# Patient Record
Sex: MALE | Race: WHITE | HISPANIC OR LATINO | Employment: UNEMPLOYED | URBAN - METROPOLITAN AREA
[De-identification: names, ages, dates, MRNs, and addresses within clinical notes are randomized per-mention and may not be internally consistent; named-entity substitution may affect disease eponyms.]

---

## 2017-04-06 DIAGNOSIS — E11.9 DIABETES MELLITUS WITHOUT COMPLICATION (HCC): ICD-10-CM

## 2017-04-06 DIAGNOSIS — E13.9 OTHER SPECIFIED DIABETES MELLITUS WITHOUT COMPLICATIONS (HCC): ICD-10-CM

## 2017-04-06 NOTE — TELEPHONE ENCOUNTER
Was the patient seen in the last year in this department? Yes     Does patient have an active prescription for medications requested? No     Received Request Via: Pharmacy    Pt is requesting 90 Day Supply.

## 2017-04-10 ENCOUNTER — HOSPITAL ENCOUNTER (OUTPATIENT)
Dept: LAB | Facility: MEDICAL CENTER | Age: 62
End: 2017-04-10
Attending: INTERNAL MEDICINE
Payer: COMMERCIAL

## 2017-04-10 LAB
CREAT SERPL-MCNC: 1.03 MG/DL (ref 0.5–1.4)
CREAT UR-MCNC: 235.1 MG/DL
EST. AVERAGE GLUCOSE BLD GHB EST-MCNC: 131 MG/DL
GFR SERPL CREATININE-BSD FRML MDRD: >60 ML/MIN/1.73 M 2
HBA1C MFR BLD: 6.2 % (ref 0–5.6)
MICROALBUMIN UR-MCNC: 2.1 MG/DL
MICROALBUMIN/CREAT UR: 9 MG/G (ref 0–30)

## 2017-04-10 PROCEDURE — 82570 ASSAY OF URINE CREATININE: CPT

## 2017-04-10 PROCEDURE — 83036 HEMOGLOBIN GLYCOSYLATED A1C: CPT

## 2017-04-10 PROCEDURE — 36415 COLL VENOUS BLD VENIPUNCTURE: CPT

## 2017-04-10 PROCEDURE — 82043 UR ALBUMIN QUANTITATIVE: CPT

## 2017-04-10 PROCEDURE — 82565 ASSAY OF CREATININE: CPT

## 2017-04-12 ENCOUNTER — OFFICE VISIT (OUTPATIENT)
Dept: MEDICAL GROUP | Facility: CLINIC | Age: 62
End: 2017-04-12
Payer: COMMERCIAL

## 2017-04-12 VITALS
OXYGEN SATURATION: 96 % | SYSTOLIC BLOOD PRESSURE: 120 MMHG | WEIGHT: 227 LBS | RESPIRATION RATE: 18 BRPM | TEMPERATURE: 97.9 F | BODY MASS INDEX: 31.78 KG/M2 | HEIGHT: 71 IN | HEART RATE: 69 BPM | DIASTOLIC BLOOD PRESSURE: 80 MMHG

## 2017-04-12 DIAGNOSIS — E78.5 DYSLIPIDEMIA: ICD-10-CM

## 2017-04-12 DIAGNOSIS — N52.9 ERECTILE DYSFUNCTION, UNSPECIFIED ERECTILE DYSFUNCTION TYPE: ICD-10-CM

## 2017-04-12 DIAGNOSIS — N40.0 BENIGN PROSTATIC HYPERPLASIA WITHOUT LOWER URINARY TRACT SYMPTOMS, UNSPECIFIED MORPHOLOGY: ICD-10-CM

## 2017-04-12 DIAGNOSIS — K76.0 FATTY LIVER: ICD-10-CM

## 2017-04-12 DIAGNOSIS — R63.8 INCREASED BMI: ICD-10-CM

## 2017-04-12 PROCEDURE — 99214 OFFICE O/P EST MOD 30 MIN: CPT | Performed by: INTERNAL MEDICINE

## 2017-04-12 ASSESSMENT — PATIENT HEALTH QUESTIONNAIRE - PHQ9: CLINICAL INTERPRETATION OF PHQ2 SCORE: 0

## 2017-04-12 NOTE — MR AVS SNAPSHOT
"        John Holguin Reyes   2017 8:40 AM   Office Visit   MRN: [id number]    Department:  [facility]   Dept Phone:  [phone]    Description:  Male : 1955   Provider:  Vaughn Waters M.D.           Allergies as of 2017     Allergen Noted Reactions    Sulfa Drugs 2014   Rash    ratches    Septra [Bactrim Ds] 2014   Rash      You were diagnosed with     Dyslipidemia   [196322]       Insulin dependent diabetes mellitus (CMS-HCC)   [760283]         Vital Signs     Blood Pressure Pulse Temperature Respirations Height Weight    120/80 mmHg 69 36.6 °C (97.9 °F) 18 1.803 m (5' 10.98\") 102.967 kg (227 lb)    Body Mass Index Oxygen Saturation Smoking Status             31.67 kg/m2 96% Never Smoker          Basic Information     Date Of Birth Sex Race Ethnicity Preferred Language    1955 Male Unknown Non- English      Problem List              ICD-10-CM Priority Class Noted - Resolved    DM type 2 (diabetes mellitus, type 2) (CMS-HCC) E11.9 Medium  2009 - Present    Hyperlipidemia E78.5   2009 - Present    BPH (benign prostatic hyperplasia) N40.0 Medium  3/11/2010 - Present    Hypertriglyceridemia E78.1 High  3/11/2010 - Present    Elevated liver enzymes R74.8 Medium  3/11/2010 - Present    Sepsis (CMS-[facility]) A41.9 High  2014 - Present    Acute renal failure (CMS-[facility]) N17.9 High  2014 - Present    Hyponatremia E87.1 Medium  2014 - Present    Allergic purpura (CMS-HCC) D69.0 High  2014 - Present      Health Maintenance        Date Due Completion Dates    IMM DTaP/Tdap/Td Vaccine (1 - Tdap) 1974 ---    COLONOSCOPY 2007 (Done)    Override on 1997: Done    RETINAL SCREENING 2012 (Done)    Override on 2011: Done    DIABETES MONOFILAMENT / LE EXAM 2014 (Done)    Override on 2013: Done    IMM ZOSTER VACCINE 2015 ---    FASTING LIPID PROFILE 2017, 2014, 3/7/2014, " 11/6/2013, 7/30/2013, 4/9/2013, 7/6/2011, 5/19/2011, 2/12/2011, 10/19/2010, 8/16/2010, 3/4/2010    A1C SCREENING 10/10/2017 4/10/2017, 9/16/2016, 2/29/2016, 7/8/2014, 3/7/2014, 11/6/2013, 7/30/2013, 4/9/2013, 9/6/2011, 2/12/2011, 10/19/2010, 8/16/2010, 3/4/2010    URINE ACR / MICROALBUMIN 4/10/2018 4/10/2017, 4/9/2013, 3/4/2010    SERUM CREATININE 4/10/2018 4/10/2017, 9/16/2016, 2/29/2016, 8/9/2014, 8/8/2014, 8/7/2014, 8/6/2014, 3/7/2014, 4/9/2013, 9/6/2011, 8/30/2011, 5/19/2011, 2/12/2011, 10/19/2010, 8/16/2010, 3/4/2010, 6/9/2008            Current Immunizations     Hepatitis B Vaccine Recombivax (Adol/Adult) 5/30/2014, 4/28/2014    Influenza Vaccine Quad Inj (Pf) 10/7/2014    Influenza Vaccine Quad Inj (Preserved) 4/28/2014    MMR Vaccine 5/1/2014  1:51 PM    Pneumococcal polysaccharide vaccine (PPSV-23) 8/7/2014  3:58 PM    Tuberculin Skin Test 5/5/2014 12:47 PM, 4/28/2014 11:20 AM      Below and/or attached are the medications your provider expects you to take. Review all of your home medications and newly ordered medications with your provider and/or pharmacist. Follow medication instructions as directed by your provider and/or pharmacist. Please keep your medication list with you and share with your provider. Update the information when medications are discontinued, doses are changed, or new medications (including over-the-counter products) are added; and carry medication information at all times in the event of emergency situations     Allergies:  SULFA DRUGS - Rash     SEPTRA - Rash               Medications  Valid as of: April 12, 2017 - 10:04 AM    Generic Name Brand Name Tablet Size Instructions for use    Cyclobenzaprine HCl (Tab) FLEXERIL 10 MG Take 1 Tab by mouth 3 times a day as needed for Mild Pain or Muscle Spasms ((or use qhs) (medicine will cause drowsiness)).        GlipiZIDE (Tab) GLUCOTROL 10 MG Take 1 Tab by mouth 2 times a day. TAKE 1 TABLET BY MOUTH TWICE A DAY        Insulin Glargine  (Solution Pen-injector) LANTUS 100 UNIT/ML Inject 42 Units as instructed every evening.        Insulin Pen Needle (Misc) Insulin Pen Needle 31G X 8 MM USE AS DIRECTED BY MD        MetFORMIN HCl (Tab) GLUCOPHAGE 1000 MG Take 1 Tab by mouth 2 times a day, with meals.        Tadalafil (Tab) CIALIS 5 MG Take 5 mg by mouth every day.        .                 Medicines prescribed today were sent to:     [facility] #[id number] - [place], [place] - [address]    [address] [place] [address]    Phone: [phone] Fax: [phone]    Open 24 Hours?: No      Medication refill instructions:       If your prescription bottle indicates you have medication refills left, it is not necessary to call your provider’s office. Please contact your pharmacy and they will refill your medication.    If your prescription bottle indicates you do not have any refills left, you may request refills at any time through one of the following ways: The online [employer] system (except Urgent Care), by calling your provider’s office, or by asking your pharmacy to contact your provider’s office with a refill request. Medication refills are processed only during regular business hours and may not be available until the next business day. Your provider may request additional information or to have a follow-up visit with you prior to refilling your medication.   *Please Note: Medication refills are assigned a new Rx number when refilled electronically. Your pharmacy may indicate that no refills were authorized even though a new prescription for the same medication is available at the pharmacy. Please request the medicine by name with the pharmacy before contacting your provider for a refill.        Your To Do List     Future Labs/Procedures Complete By Expires    HBA1C  10/9/2017 4/12/2018    CREATININE  As directed 4/12/2018    LIPOPROTEIN A  As directed 4/12/2018    LIPOPROTEIN A  As directed 4/12/2018         [employer] Access Code:  [email or web address]  Expires: 5/10/2017  6:05 AM    [employer]  A secure, online tool to manage your health information     [employer]’s [employer]® is a secure, online tool that connects you to your personalized health information from the privacy of your home -- day or night - making it very easy for you to manage your healthcare. Once the activation process is completed, you can even access your medical information using the Om[employer] tamara, which is available for free in the Apple Tamara store or Google Play store.     [employer] provides the following levels of access (as shown below):   My Chart Features   [facility] Primary Care Doctor [facility]  Specialists [facility]  Urgent  Care Non-[facility]  Primary Care  Doctor   Email your healthcare team securely and privately 24/7 X X X    Manage appointments: schedule your next appointment; view details of past/upcoming appointments X      Request prescription refills. X      View recent personal medical records, including lab and immunizations X X X X   View health record, including health history, allergies, medications X X X X   Read reports about your outpatient visits, procedures, consult and ER notes X X X X   See your discharge summary, which is a recap of your hospital and/or ER visit that includes your diagnosis, lab results, and care plan. X X       How to register for [employer]:  1. Go to  [email or web address].  2. Click on the Sign Up Now box, which takes you to the New Member Sign Up page. You will need to provide the following information:  a. Enter your [employer] Access Code exactly as it appears at the top of this page. (You will not need to use this code after you’ve completed the sign-up process. If you do not sign up before the expiration date, you must request a new code.)   b. Enter your date of birth.   c. Enter your home email address.   d. Click Submit, and follow the next screen’s instructions.  3. Create a Om[employer] Media ID. This will be your Om[employer] Media login ID and cannot be  changed, so think of one that is secure and easy to remember.  4. Create a [employer] password. You can change your password at any time.  5. Enter your Password Reset Question and Answer. This can be used at a later time if you forget your password.   6. Enter your e-mail address. This allows you to receive e-mail notifications when new information is available in [employer]!.  7. Click Sign Up. You can now view your health information.    For assistance activating your [employer] account, call [phone]

## 2017-04-12 NOTE — PROGRESS NOTES
Subjective:  Asaf is a 61 y.o. male with the following   Past Medical History   Diagnosis Date   • Psychiatric disorder      depression   • Hypertension    • Diabetes    • Depression 11/4/2009   • DIABETES MELLITUS 11/4/2009   • Hyperlipidemia 11/4/2009   • BPH (benign prostatic hyperplasia) 3/11/2010   • Elevated liver enzymes 3/11/2010      Family History   Problem Relation Age of Onset   • Cancer Mother      breast    • Diabetes Father      The patient is on the following medications:   Current outpatient prescriptions:   •  insulin glargine (LANTUS SOLOSTAR) 100 UNIT/ML Solution Pen-injector injection, Inject 42 Units as instructed every evening., Disp: 5 PEN, Rfl: 6  •  Insulin Pen Needle (B-D ULTRAFINE III SHORT PEN) 31G X 8 MM Misc, USE AS DIRECTED BY MD, Disp: 100 Each, Rfl: 1  •  cyclobenzaprine (FLEXERIL) 10 MG Tab, Take 1 Tab by mouth 3 times a day as needed for Mild Pain or Muscle Spasms ((or use qhs) (medicine will cause drowsiness))., Disp: 15 Tab, Rfl: 0  •  glipiZIDE (GLUCOTROL) 10 MG Tab, Take 1 Tab by mouth 2 times a day. TAKE 1 TABLET BY MOUTH TWICE A DAY (Patient taking differently: Take 5 mg by mouth 2 times a day. TAKE 1 TABLET BY MOUTH TWICE A DAY), Disp: 180 Tab, Rfl: 3  •  metformin (GLUCOPHAGE) 1000 MG tablet, Take 1 Tab by mouth 2 times a day, with meals. (Patient taking differently: Take 500 mg by mouth 2 times a day, with meals.), Disp: 180 Tab, Rfl: 3  •  insulin glargine (LANTUS) 100 UNIT/ML SOLN, Inject 30 Units as instructed every evening., Disp: , Rfl:   •  hydrocodone-acetaminophen (NORCO) 5-325 MG Tab per tablet, Take 1 Tab by mouth every 6 hours as needed., Disp: 18 Tab, Rfl: 0  •  tadalafil (CIALIS) 5 MG tablet, Take 5 mg by mouth every day., Disp: , Rfl:     HPI; Patient is here today for follow-up visit regarding his recent labs, currently on Lantus, metformin and glipizide for diabetes denies having polyuria or polydipsia, on vitamin D supplements vitamin D deficiency denies  "having back pain, on Cialis for BPH and erectile dysfunction denies having nocturia, hematuria or urinary hesitancy or side effects of medication such as headache.. Patient has had history of fatty liver, denies having abdominal pain or jaundice.  ROS:  See HPI    Blood pressure 120/80, pulse 69, temperature 36.6 °C (97.9 °F), resp. rate 18, height 1.803 m (5' 10.98\"), weight 102.967 kg (227 lb), SpO2 96 %.on RA  Objective:  Patient is well appearing and in no acute distress.  Pharynx is clear.  Neck is soft and supple with no cervical or supraclavicular lymphadenopathy, thyromegaly or masses, no JVD.  Lungs clear to auscultation bilaterally with normal respiratory effort. Abdomen soft, non-tender on palpation,not distended. Heart regular rate and rhythm without murmur. Extremities without any clubbing, cyanosis, or edema.    Assessment and Plan:  1. diabetic mellitus; on insulin lantus 30 units qd , metformin 500 mg po bid and Glipizide 5 mg po bid, , followup hemoglobin A1c ;   Ref. Range 2/29/2016 09:04 9/16/2016 06:24 4/10/2017 06:15   Glycohemoglobin Latest Ref Range: 0.0-5.6 % 5.9 (H) 6.2 (H) 6.2 (H)        Ref. Range 4/10/2017 06:16   Micro Alb Creat Ratio Latest Ref Range: 0-30 mg/g 9   Creatinine, Urine Latest Units: mg/dL 235.10   Microalbumin, Urine Random Latest Units: mg/dL 2.1       2. History of Dyslipidemia; off lovastatin due to elevated ALT/ liver disease, patient also prefers no medication .          3. History of  elevated ALT/ Fatty liver; status post negative workup except ultrasound indicating fatty liver.       4. BPH/elevated PSA/  ED ; on Cialis 5 mg daily, currently is asymptomatic , status post negative prostate biopsies,  followed by Dr. Shaq bazzi.        5. Increased BMI ;        6. vitamin D insufficiency ; on vitamin D supplements.           Plan    Plan:        Patient is advised regarding preventive and supportive care, diet and lifestyle modification, daily walking ,exercise and " weight loss, alternative therapies for diabetes and prevention of cardiovascular disease and monitor labs.     Please note that this dictation was created using voice recognition software. I have worked with consultants from the vendor as well as technical experts from [facility] to optimize the interface. I have made every reasonable attempt to correct obvious errors, but I expect that there are errors of grammar and possibly content that I did not discover before finalizing the note.

## 2017-04-13 ENCOUNTER — HOSPITAL ENCOUNTER (OUTPATIENT)
Dept: LAB | Facility: MEDICAL CENTER | Age: 62
End: 2017-04-13
Attending: INTERNAL MEDICINE
Payer: COMMERCIAL

## 2017-04-26 ENCOUNTER — HOSPITAL ENCOUNTER (OUTPATIENT)
Dept: LAB | Facility: MEDICAL CENTER | Age: 62
End: 2017-04-26
Attending: INTERNAL MEDICINE
Payer: COMMERCIAL

## 2017-04-26 DIAGNOSIS — E78.5 DYSLIPIDEMIA: ICD-10-CM

## 2017-04-26 PROCEDURE — 83695 ASSAY OF LIPOPROTEIN(A): CPT

## 2017-04-26 PROCEDURE — 36415 COLL VENOUS BLD VENIPUNCTURE: CPT

## 2017-04-28 LAB — LPA SERPL-MCNC: 3 MG/DL

## 2017-06-02 DIAGNOSIS — E11.9 DIABETES MELLITUS WITHOUT COMPLICATION (HCC): ICD-10-CM

## 2017-06-02 RX ORDER — GLIPIZIDE 10 MG/1
10 TABLET ORAL 2 TIMES DAILY
Qty: 180 TAB | Refills: 0 | Status: SHIPPED | OUTPATIENT
Start: 2017-06-02 | End: 2018-03-12 | Stop reason: SDUPTHER

## 2017-07-07 ENCOUNTER — OFFICE VISIT (OUTPATIENT)
Dept: URGENT CARE | Facility: PHYSICIAN GROUP | Age: 62
End: 2017-07-07
Payer: COMMERCIAL

## 2017-07-07 VITALS
BODY MASS INDEX: 30.48 KG/M2 | OXYGEN SATURATION: 94 % | HEIGHT: 72 IN | DIASTOLIC BLOOD PRESSURE: 82 MMHG | TEMPERATURE: 97.8 F | WEIGHT: 225 LBS | SYSTOLIC BLOOD PRESSURE: 126 MMHG | HEART RATE: 79 BPM

## 2017-07-07 DIAGNOSIS — J22 ACUTE RESPIRATORY INFECTION: ICD-10-CM

## 2017-07-07 PROCEDURE — 99214 OFFICE O/P EST MOD 30 MIN: CPT | Performed by: FAMILY MEDICINE

## 2017-07-07 RX ORDER — DOXYCYCLINE HYCLATE 100 MG
TABLET ORAL
Qty: 20 TAB | Refills: 0 | Status: SHIPPED | OUTPATIENT
Start: 2017-07-07 | End: 2018-04-24
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
## 2017-07-07 NOTE — MR AVS SNAPSHOT
" John Holguin Reyes   2017 8:25 AM   Office Visit   MRN: [id number]    Department:  [facility]   Dept Phone:  [phone]    Description:  Male : 1955   Provider:  Seun Jason M.D.           Reason for Visit     Cough x 5 days chest congestion       Allergies as of 2017     Allergen Noted Reactions    Sulfa Drugs 2014   Rash    ratches    Septra [Bactrim Ds] 2014   Rash      You were diagnosed with     Acute respiratory infection   [358819]         Vital Signs     Blood Pressure Pulse Temperature Height Weight Body Mass Index    126/82 mmHg 79 36.6 °C (97.8 °F) 1.816 m (5' 11.5\") 102.059 kg (225 lb) 30.95 kg/m2    Oxygen Saturation Smoking Status                94% Never Smoker           Basic Information     Date Of Birth Sex Race Ethnicity Preferred Language    1955 Male Unknown Non- English      Problem List              ICD-10-CM Priority Class Noted - Resolved    DM type 2 (diabetes mellitus, type 2) (CMS-[facility]) E11.9 Medium  2009 - Present    Hyperlipidemia E78.5   2009 - Present    BPH (benign prostatic hyperplasia) N40.0 Medium  3/11/2010 - Present    Hypertriglyceridemia E78.1 High  3/11/2010 - Present    Elevated liver enzymes R74.8 Medium  3/11/2010 - Present    Sepsis (CMS-HCC) A41.9 High  2014 - Present    Acute renal failure (CMS-HCC) N17.9 High  2014 - Present    Hyponatremia E87.1 Medium  2014 - Present    Allergic purpura (CMS-HCC) D69.0 High  2014 - Present      Health Maintenance        Date Due Completion Dates    IMM DTaP/Tdap/Td Vaccine (1 - Tdap) 1974 ---    COLONOSCOPY 2007 (Done)    Override on 1997: Done    RETINAL SCREENING 2012 (Done)    Override on 2011: Done    DIABETES MONOFILAMENT / LE EXAM 2014 (Done)    Override on 2013: Done    IMM ZOSTER VACCINE 2015 ---    FASTING LIPID PROFILE 2017, 2014, 3/7/2014, 2013, " 7/30/2013, 4/9/2013, 7/6/2011, 5/19/2011, 2/12/2011, 10/19/2010, 8/16/2010, 3/4/2010    IMM INFLUENZA (1) 9/1/2017 10/7/2014, 4/28/2014    A1C SCREENING 10/10/2017 4/10/2017, 9/16/2016, 2/29/2016, 7/8/2014, 3/7/2014, 11/6/2013, 7/30/2013, 4/9/2013, 9/6/2011, 2/12/2011, 10/19/2010, 8/16/2010, 3/4/2010    URINE ACR / MICROALBUMIN 4/10/2018 4/10/2017, 4/9/2013, 3/4/2010    SERUM CREATININE 4/10/2018 4/10/2017, 9/16/2016, 2/29/2016, 8/9/2014, 8/8/2014, 8/7/2014, 8/6/2014, 3/7/2014, 4/9/2013, 9/6/2011, 8/30/2011, 5/19/2011, 2/12/2011, 10/19/2010, 8/16/2010, 3/4/2010, 6/9/2008            Current Immunizations     Hepatitis B Vaccine Recombivax (Adol/Adult) 5/30/2014, 4/28/2014    Influenza Vaccine Quad Inj (Pf) 10/7/2014    Influenza Vaccine Quad Inj (Preserved) 4/28/2014    MMR Vaccine 5/1/2014  1:51 PM    Pneumococcal polysaccharide vaccine (PPSV-23) 8/7/2014  3:58 PM    Tuberculin Skin Test 5/5/2014 12:47 PM, 4/28/2014 11:20 AM      Below and/or attached are the medications your provider expects you to take. Review all of your home medications and newly ordered medications with your provider and/or pharmacist. Follow medication instructions as directed by your provider and/or pharmacist. Please keep your medication list with you and share with your provider. Update the information when medications are discontinued, doses are changed, or new medications (including over-the-counter products) are added; and carry medication information at all times in the event of emergency situations     Allergies:  SULFA DRUGS - Rash     SEPTRA - Rash               Medications  Valid as of: July 07, 2017 -  9:31 AM    Generic Name Brand Name Tablet Size Instructions for use    Blood Glucose Monitoring Suppl (Misc) Blood Glucose Monitoring Suppl  Use  TID  with symptoms of high/low blood sugar.        Doxycycline Hyclate (Tab) VIBRAMYCIN 100 MG 1 TAB TWICE A DAY X 10 DAYS.        GlipiZIDE (Tab) GLUCOTROL 10 MG Take 1 Tab by mouth 2  times a day. TAKE 1 TABLET BY MOUTH TWICE A DAY        Insulin Glargine (Solution Pen-injector) LANTUS 100 UNIT/ML Inject 42 Units as instructed every evening.        Insulin Pen Needle (Misc) Insulin Pen Needle 31G X 8 MM USE AS DIRECTED BY MD        MetFORMIN HCl (Tab) GLUCOPHAGE 1000 MG Take 1 Tab by mouth 2 times a day, with meals.        Tadalafil (Tab) CIALIS 5 MG Take 5 mg by mouth every day.        .                 Medicines prescribed today were sent to:     [id number] - [place], [place] - [id number] [facility] [address]    Phone: [phone] Fax: [phone]    Open 24 Hours?: No      Medication refill instructions:       If your prescription bottle indicates you have medication refills left, it is not necessary to call your provider’s office. Please contact your pharmacy and they will refill your medication.    If your prescription bottle indicates you do not have any refills left, you may request refills at any time through one of the following ways: The online [employer] system (except Urgent Care), by calling your provider’s office, or by asking your pharmacy to contact your provider’s office with a refill request. Medication refills are processed only during regular business hours and may not be available until the next business day. Your provider may request additional information or to have a follow-up visit with you prior to refilling your medication.   *Please Note: Medication refills are assigned a new Rx number when refilled electronically. Your pharmacy may indicate that no refills were authorized even though a new prescription for the same medication is available at the pharmacy. Please request the medicine by name with the pharmacy before contacting your provider for a refill.           [employer] Access Code: Activation code not generated  Current [employer] Status: Active

## 2017-07-07 NOTE — Clinical Note
July 7, 2017         Patient: John Holguin Reyes   YOB: 1955   Date of Visit: 7/7/2017           To Whom it May Concern:    John Reyes was seen in my clinic on 7/7/2017.     Please excuse from work for 7/7 and 7/8/17 due to medical condition.    May return on 7/8/17 if better.    If you have any questions or concerns, please don't hesitate to call.        Sincerely,           Seun Jason M.D.  Electronically Signed

## 2017-11-09 NOTE — TELEPHONE ENCOUNTER
Was the patient seen in the last year in this department?   Yes    Does patient have an active prescription for medications requested? No     Received Request Via: Patient

## 2017-11-10 RX ORDER — FLURBIPROFEN SODIUM 0.3 MG/ML
SOLUTION/ DROPS OPHTHALMIC
Qty: 100 EACH | Refills: 0 | Status: SHIPPED | OUTPATIENT
Start: 2017-11-10 | End: 2018-01-12 | Stop reason: SDUPTHER

## 2018-01-08 ENCOUNTER — TELEPHONE (OUTPATIENT)
Dept: MEDICAL GROUP | Facility: PHYSICIAN GROUP | Age: 63
End: 2018-01-08

## 2018-01-08 NOTE — TELEPHONE ENCOUNTER
1. Caller Name: Asaf                             Call Back Number: [phone] (home)        Patient approves a detailed voicemail message: yes    Patient LVM requesting a call back.  I spoke with patient and he explained he saw [facility] for a back injury through Workman's comp.  Patient states over the last week the back pain has been increasingly worse.  As this is still an ongoing workman's comp case patient will present to  for treatment.

## 2018-01-13 NOTE — TELEPHONE ENCOUNTER
I spoke with patient and confirmed we have correct rx on file now and he would like refill sent to pharmacy.    Was the patient seen in the last year in this department? Yes     Does patient have an active prescription for medications requested? No     Received Request Via: Patient

## 2018-01-13 NOTE — TELEPHONE ENCOUNTER
VOICEMAIL  1. Caller Name: Asaf               Call Back Number: [phone] (home)      2. Message: Patient LVM stating there was an error on rx.     3. Patient approves office to leave a detailed voicemail/MyChart message: yes

## 2018-02-13 NOTE — TELEPHONE ENCOUNTER
Patient has called the  twice today for this refill.  Could you please send in - [place] patient.  Thank you.

## 2018-02-13 NOTE — TELEPHONE ENCOUNTER
Phone Number Called: [phone] (work) - unable to LVM on home phone.     Message: LVM informing patient Rx has been sent to [facility] pharmacy.     Left Message for patient to call back: no

## 2018-03-12 DIAGNOSIS — E11.9 DIABETES MELLITUS WITHOUT COMPLICATION (HCC): ICD-10-CM

## 2018-03-12 RX ORDER — GLIPIZIDE 10 MG/1
10 TABLET ORAL 2 TIMES DAILY
Qty: 180 TAB | Refills: 0 | Status: SHIPPED | OUTPATIENT
Start: 2018-03-12 | End: 2018-06-20 | Stop reason: SDUPTHER

## 2018-03-12 NOTE — TELEPHONE ENCOUNTER
Was the patient seen in the last year in this department? Yes     Does patient have an active prescription for medications requested? No     Received Request Via: Patient       Patient is establishing 04/24/2018 on [place]. PIR[facility]

## 2018-04-06 ENCOUNTER — TELEPHONE (OUTPATIENT)
Dept: MEDICAL GROUP | Facility: PHYSICIAN GROUP | Age: 63
End: 2018-04-06

## 2018-04-06 NOTE — TELEPHONE ENCOUNTER
VOICEMAIL  1. Caller Name: John Holguin Reyes                        Call Back Number: [phone] (home) [phone] (work)      2. Message: Patient called and left a VM. Returned patient calls. LM    3. Patient approves office to leave a detailed voicemail/MyChart message: no VM  FULL. Lm

## 2018-04-06 NOTE — TELEPHONE ENCOUNTER
Was the patient seen in the last year in this department? Yes     Does patient have an active prescription for medications requested? No     Received Request Via: Pharmacy     Pt is going to establish at [place] didi month or so.

## 2018-04-24 ENCOUNTER — OFFICE VISIT (OUTPATIENT)
Dept: MEDICAL GROUP | Facility: PHYSICIAN GROUP | Age: 63
End: 2018-04-24
Payer: COMMERCIAL

## 2018-04-24 VITALS
HEIGHT: 71 IN | SYSTOLIC BLOOD PRESSURE: 142 MMHG | DIASTOLIC BLOOD PRESSURE: 84 MMHG | TEMPERATURE: 97.5 F | HEART RATE: 76 BPM | BODY MASS INDEX: 32.9 KG/M2 | WEIGHT: 235 LBS | OXYGEN SATURATION: 96 % | RESPIRATION RATE: 16 BRPM

## 2018-04-24 DIAGNOSIS — R35.1 BENIGN PROSTATIC HYPERPLASIA WITH NOCTURIA: ICD-10-CM

## 2018-04-24 DIAGNOSIS — E11.9 TYPE 2 DIABETES MELLITUS WITHOUT COMPLICATION, WITH LONG-TERM CURRENT USE OF INSULIN (HCC): ICD-10-CM

## 2018-04-24 DIAGNOSIS — E66.9 OBESITY (BMI 30-39.9): ICD-10-CM

## 2018-04-24 DIAGNOSIS — Z87.828 H/O BACK INJURY: ICD-10-CM

## 2018-04-24 DIAGNOSIS — E78.5 HYPERLIPIDEMIA, UNSPECIFIED HYPERLIPIDEMIA TYPE: ICD-10-CM

## 2018-04-24 DIAGNOSIS — N40.1 BENIGN PROSTATIC HYPERPLASIA WITH NOCTURIA: ICD-10-CM

## 2018-04-24 DIAGNOSIS — Z79.4 TYPE 2 DIABETES MELLITUS WITHOUT COMPLICATION, WITH LONG-TERM CURRENT USE OF INSULIN (HCC): ICD-10-CM

## 2018-04-24 DIAGNOSIS — Z12.11 SCREEN FOR COLON CANCER: ICD-10-CM

## 2018-04-24 PROCEDURE — 99214 OFFICE O/P EST MOD 30 MIN: CPT | Performed by: NURSE PRACTITIONER

## 2018-04-24 ASSESSMENT — PATIENT HEALTH QUESTIONNAIRE - PHQ9: CLINICAL INTERPRETATION OF PHQ2 SCORE: 0

## 2018-04-24 NOTE — ASSESSMENT & PLAN NOTE
Diabetic and has lost some weight.  Taking metformin and some insulin.  Due for all screening labs.

## 2018-04-24 NOTE — ASSESSMENT & PLAN NOTE
Having some increased urination at night. Using cialis for improvement of symptoms.  Flomax did not help.

## 2018-04-24 NOTE — PROGRESS NOTES
"Chief Complaint   Patient presents with   • Diabetes   • Establish Care     Dr. Waters pt        Subjective:   John Holguin Reyes is a 63 y.o. male here today to establish care and for evaluation and management of:    DM type 2 (diabetes mellitus, type 2)  Diabetic and has lost some weight.  Taking metformin and some insulin.  Due for all screening labs.     BPH (benign prostatic hyperplasia)  Having some increased urination at night. Using cialis for improvement of symptoms.  Flomax did not help.     Hyperlipidemia  Due for labs.  Not currently on any medication.     H/O back injury  Industrial injury.          Current medicines (including changes today)  Current Outpatient Prescriptions   Medication Sig Dispense Refill   • insulin glargine (LANTUS SOLOSTAR) 100 UNIT/ML Solution Pen-injector injection Inject 28 Units as instructed every evening. 15 PEN 3   • Insulin Pen Needle (B-D UF III MINI PEN NEEDLES) 31G X 5 MM Misc USE DAILY DIAGNOSIS: E11.9 100 Each 3   • glipiZIDE (GLUCOTROL) 10 MG Tab Take 1 Tab by mouth 2 times a day. TAKE 1 TABLET BY MOUTH TWICE A  Tab 0   • metformin (GLUCOPHAGE) 1000 MG tablet Take 1 Tab by mouth 2 times a day, with meals. 180 Tab 0   • Blood Glucose Monitoring Suppl Misc Use  TID  with symptoms of high/low blood sugar. 100 Act 6   • tadalafil (CIALIS) 5 MG tablet Take 5 mg by mouth every day.       No current facility-administered medications for this visit.      He  has a past medical history of BPH (benign prostatic hyperplasia) (3/11/2010); Depression (11/4/2009); Diabetes; DIABETES MELLITUS (11/4/2009); Elevated liver enzymes (3/11/2010); Hyperlipidemia (11/4/2009); Hypertension; and Psychiatric disorder.    ROS as stated in hpi  No chest pain, no shortness of breath, no abdominal pain       Objective:     Blood pressure 142/84, pulse 76, temperature 36.4 °C (97.5 °F), resp. rate 16, height 1.816 m (5' 11.5\"), weight 106.6 kg (235 lb), SpO2 96 %. Body mass index is 32.32 " kg/m².   Physical Exam:  Constitutional: Alert, no distress.  Skin: Warm, dry, good turgor,no cyanosis, no rashes in visible areas.  Eye: Equal, round and reactive, conjunctiva clear, lids normal.  Ears: No tenderness, no discharge.  External canals are without any significant edema or erythema.    Gross auditory acuity is intact.  Nose: symmetrical without tenderness, no discharge.  Mouth/Throat: lips without lesion.  Oropharynx clear.    Neck: Trachea midline, no masses, no obvious thyroid enlargement. Range of motion within normal limits.  Neuro: Cranial nerves 2-12 grossly intact.  No sensory deficit.  Respiratory: Unlabored respiratory effort, lungs clear to auscultation, no wheezes, no ronchi.  Cardiovascular: Normal S1, S2, no murmur, no edema.  Abdomen: Soft, non-tender, no masses, no guarding,  no hepatosplenomegaly.  Psych: Alert and oriented x3, normal affect and mood and judgement.        Assessment and Plan:   The following treatment plan was discussed    1. Benign prostatic hyperplasia with nocturia  This is a new problem to me.  Chronic. Ongoing.  Taking cialis daily with improvement.  Seeing Urology yearly  Monitor.     2. Hyperlipidemia, unspecified hyperlipidemia type  This is a new problem to me.  Chronic. Ongoing.  Will recheck labs.  Monitor and follow.  No meds at this time.  Will consider Statin due to diabetes.   - LIPID PROFILE; Future  - TSH; Future    3. Type 2 diabetes mellitus without complication, with long-term current use of insulin (CMS-HCC)  This is a new problem to me.  Chronic. Ongoing.  Need updated labs.  Continue metformin, glipizide and lantus.  Discussed ace inihibitor and statin.  Will monitor and follow labs and discuss these medications.   - CBC WITH DIFFERENTIAL; Future  - COMP METABOLIC PANEL; Future  - HEMOGLOBIN A1C; Future  - MICROALBUMIN CREAT RATIO URINE; Future    4. Obesity (BMI 30-39.9)  This is a new problem to me.  Chronic. Ongoing.  BMI 32.32.  Discussed diet  and exercise.   - Patient identified as having weight management issue.  Appropriate orders and counseling given.    5. H/O back injury  This is a new problem to me.  Chronic. Stable.      6. Screen for colon cancer  Due for colonoscopy.  Referral placed.  Monitor and follow.   - REFERRAL TO GI FOR COLONOSCOPY      Followup: Return in about 6 months (around 10/24/2018) for Diabetes.

## 2018-06-20 DIAGNOSIS — E11.9 DIABETES MELLITUS WITHOUT COMPLICATION (HCC): ICD-10-CM

## 2018-06-20 RX ORDER — GLIPIZIDE 10 MG/1
TABLET ORAL
Qty: 180 TAB | Refills: 0 | Status: SHIPPED | OUTPATIENT
Start: 2018-06-20 | End: 2018-08-13 | Stop reason: SDUPTHER

## 2018-06-20 NOTE — TELEPHONE ENCOUNTER
Requested Prescriptions     Signed Prescriptions Disp Refills   • metformin (GLUCOPHAGE) 1000 MG tablet 180 Tab 0     Sig: TAKE 1 TABLET BY MOUTH TWO  TIMES DAILY WITH MEALS     Authorizing Provider: CADENCE MCBRIDE   • glipiZIDE (GLUCOTROL) 10 MG Tab 180 Tab 0     Sig: TAKE 1 TABLET BY MOUTH 2  TIMES A DAY     Authorizing Provider: CADENCE MCBRIDE A.P.R.N.

## 2018-08-13 DIAGNOSIS — E11.9 DIABETES MELLITUS WITHOUT COMPLICATION (HCC): ICD-10-CM

## 2018-08-13 RX ORDER — GLIPIZIDE 10 MG/1
TABLET ORAL
Qty: 180 TAB | Refills: 1 | Status: SHIPPED | OUTPATIENT
Start: 2018-08-13 | End: 2019-03-23 | Stop reason: SDUPTHER

## 2018-08-13 NOTE — TELEPHONE ENCOUNTER
Requested Prescriptions     Signed Prescriptions Disp Refills   • glipiZIDE (GLUCOTROL) 10 MG Tab 180 Tab 1     Sig: TAKE 1 TABLET BY MOUTH TWO  TIMES DAILY     Authorizing Provider: CADENCE MCBRIDE   • metformin (GLUCOPHAGE) 1000 MG tablet 180 Tab 1     Sig: TAKE 1 TABLET BY MOUTH TWO  TIMES DAILY WITH MEALS     Authorizing Provider: CADENCE MCBRIDE A.P.R.N.

## 2018-12-17 ENCOUNTER — HOSPITAL ENCOUNTER (OUTPATIENT)
Dept: LAB | Facility: MEDICAL CENTER | Age: 63
End: 2018-12-17
Attending: PHYSICIAN ASSISTANT
Payer: COMMERCIAL

## 2018-12-17 ENCOUNTER — HOSPITAL ENCOUNTER (OUTPATIENT)
Dept: LAB | Facility: MEDICAL CENTER | Age: 63
End: 2018-12-17
Attending: NURSE PRACTITIONER
Payer: COMMERCIAL

## 2018-12-17 DIAGNOSIS — Z79.4 TYPE 2 DIABETES MELLITUS WITHOUT COMPLICATION, WITH LONG-TERM CURRENT USE OF INSULIN (HCC): ICD-10-CM

## 2018-12-17 DIAGNOSIS — E11.9 TYPE 2 DIABETES MELLITUS WITHOUT COMPLICATION, WITH LONG-TERM CURRENT USE OF INSULIN (HCC): ICD-10-CM

## 2018-12-17 DIAGNOSIS — E78.5 HYPERLIPIDEMIA, UNSPECIFIED HYPERLIPIDEMIA TYPE: ICD-10-CM

## 2018-12-17 LAB
ALBUMIN SERPL BCP-MCNC: 4.5 G/DL (ref 3.2–4.9)
ALBUMIN/GLOB SERPL: 1.7 G/DL
ALP SERPL-CCNC: 56 U/L (ref 30–99)
ALT SERPL-CCNC: 115 U/L (ref 2–50)
ANION GAP SERPL CALC-SCNC: 6 MMOL/L (ref 0–11.9)
AST SERPL-CCNC: 51 U/L (ref 12–45)
BASOPHILS # BLD AUTO: 0.8 % (ref 0–1.8)
BASOPHILS # BLD: 0.08 K/UL (ref 0–0.12)
BILIRUB SERPL-MCNC: 0.7 MG/DL (ref 0.1–1.5)
BUN SERPL-MCNC: 19 MG/DL (ref 8–22)
CALCIUM SERPL-MCNC: 9.7 MG/DL (ref 8.5–10.5)
CHLORIDE SERPL-SCNC: 107 MMOL/L (ref 96–112)
CHOLEST SERPL-MCNC: 201 MG/DL (ref 100–199)
CO2 SERPL-SCNC: 28 MMOL/L (ref 20–33)
CREAT SERPL-MCNC: 1.2 MG/DL (ref 0.5–1.4)
CREAT UR-MCNC: 210.9 MG/DL
EOSINOPHIL # BLD AUTO: 0.24 K/UL (ref 0–0.51)
EOSINOPHIL NFR BLD: 2.4 % (ref 0–6.9)
ERYTHROCYTE [DISTWIDTH] IN BLOOD BY AUTOMATED COUNT: 44.7 FL (ref 35.9–50)
GLOBULIN SER CALC-MCNC: 2.6 G/DL (ref 1.9–3.5)
GLUCOSE SERPL-MCNC: 156 MG/DL (ref 65–99)
HCT VFR BLD AUTO: 48.4 % (ref 42–52)
HDLC SERPL-MCNC: 33 MG/DL
HGB BLD-MCNC: 16.2 G/DL (ref 14–18)
IMM GRANULOCYTES # BLD AUTO: 0.03 K/UL (ref 0–0.11)
IMM GRANULOCYTES NFR BLD AUTO: 0.3 % (ref 0–0.9)
LDLC SERPL CALC-MCNC: 118 MG/DL
LYMPHOCYTES # BLD AUTO: 1.9 K/UL (ref 1–4.8)
LYMPHOCYTES NFR BLD: 18.8 % (ref 22–41)
MCH RBC QN AUTO: 30.1 PG (ref 27–33)
MCHC RBC AUTO-ENTMCNC: 33.5 G/DL (ref 33.7–35.3)
MCV RBC AUTO: 89.8 FL (ref 81.4–97.8)
MICROALBUMIN UR-MCNC: 9.2 MG/DL
MICROALBUMIN/CREAT UR: 44 MG/G (ref 0–30)
MONOCYTES # BLD AUTO: 0.58 K/UL (ref 0–0.85)
MONOCYTES NFR BLD AUTO: 5.7 % (ref 0–13.4)
NEUTROPHILS # BLD AUTO: 7.29 K/UL (ref 1.82–7.42)
NEUTROPHILS NFR BLD: 72 % (ref 44–72)
NRBC # BLD AUTO: 0 K/UL
NRBC BLD-RTO: 0 /100 WBC
PLATELET # BLD AUTO: 260 K/UL (ref 164–446)
PMV BLD AUTO: 10.9 FL (ref 9–12.9)
POTASSIUM SERPL-SCNC: 4.5 MMOL/L (ref 3.6–5.5)
PROT SERPL-MCNC: 7.1 G/DL (ref 6–8.2)
PSA SERPL-MCNC: 7.95 NG/ML (ref 0–4)
RBC # BLD AUTO: 5.39 M/UL (ref 4.7–6.1)
SODIUM SERPL-SCNC: 141 MMOL/L (ref 135–145)
TRIGL SERPL-MCNC: 252 MG/DL (ref 0–149)
TSH SERPL DL<=0.005 MIU/L-ACNC: 2.08 UIU/ML (ref 0.38–5.33)
WBC # BLD AUTO: 10.1 K/UL (ref 4.8–10.8)

## 2018-12-17 PROCEDURE — 84153 ASSAY OF PSA TOTAL: CPT

## 2018-12-17 PROCEDURE — 84443 ASSAY THYROID STIM HORMONE: CPT

## 2018-12-17 PROCEDURE — 36415 COLL VENOUS BLD VENIPUNCTURE: CPT

## 2018-12-17 PROCEDURE — 82043 UR ALBUMIN QUANTITATIVE: CPT

## 2018-12-17 PROCEDURE — 80053 COMPREHEN METABOLIC PANEL: CPT

## 2018-12-17 PROCEDURE — 80061 LIPID PANEL: CPT

## 2018-12-17 PROCEDURE — 83036 HEMOGLOBIN GLYCOSYLATED A1C: CPT

## 2018-12-17 PROCEDURE — 85025 COMPLETE CBC W/AUTO DIFF WBC: CPT

## 2018-12-17 PROCEDURE — 82570 ASSAY OF URINE CREATININE: CPT

## 2018-12-18 LAB
EST. AVERAGE GLUCOSE BLD GHB EST-MCNC: 140 MG/DL
HBA1C MFR BLD: 6.5 % (ref 0–5.6)

## 2018-12-27 ENCOUNTER — OFFICE VISIT (OUTPATIENT)
Dept: MEDICAL GROUP | Facility: PHYSICIAN GROUP | Age: 63
End: 2018-12-27
Payer: COMMERCIAL

## 2018-12-27 VITALS
HEART RATE: 78 BPM | BODY MASS INDEX: 33.05 KG/M2 | WEIGHT: 244 LBS | TEMPERATURE: 96.8 F | OXYGEN SATURATION: 93 % | HEIGHT: 72 IN | SYSTOLIC BLOOD PRESSURE: 124 MMHG | DIASTOLIC BLOOD PRESSURE: 78 MMHG

## 2018-12-27 DIAGNOSIS — R35.1 BENIGN PROSTATIC HYPERPLASIA WITH NOCTURIA: ICD-10-CM

## 2018-12-27 DIAGNOSIS — R74.8 ELEVATED LIVER ENZYMES: ICD-10-CM

## 2018-12-27 DIAGNOSIS — N40.1 BENIGN PROSTATIC HYPERPLASIA WITH NOCTURIA: ICD-10-CM

## 2018-12-27 DIAGNOSIS — E78.2 MIXED HYPERLIPIDEMIA: ICD-10-CM

## 2018-12-27 DIAGNOSIS — R80.9 TYPE 2 DIABETES MELLITUS WITH MICROALBUMINURIA, WITHOUT LONG-TERM CURRENT USE OF INSULIN (HCC): ICD-10-CM

## 2018-12-27 DIAGNOSIS — E11.29 TYPE 2 DIABETES MELLITUS WITH MICROALBUMINURIA, WITHOUT LONG-TERM CURRENT USE OF INSULIN (HCC): ICD-10-CM

## 2018-12-27 PROCEDURE — 99214 OFFICE O/P EST MOD 30 MIN: CPT | Performed by: NURSE PRACTITIONER

## 2018-12-28 NOTE — ASSESSMENT & PLAN NOTE
Ongoing problem followed by Urology [place] annually. He will be seeing him next week.    Ref. Range 2/29/2016 09:01 12/1/2016 16:02 12/17/2018 08:34   Prostatic Specific Antigen Tot Latest Ref Range: 0.00 - 4.00 ng/mL 4.63 (H) 5.72 (H) 7.95 (H)

## 2018-12-28 NOTE — ASSESSMENT & PLAN NOTE
Patient is diagnosed dyslipidemia with associated diabetes and is not on any statin therapy   Ref. Range 2/29/2016 09:04 12/17/2018 08:34   Cholesterol,Tot Latest Ref Range: 100 - 199 mg/dL 201 (H) 201 (H)   Triglycerides Latest Ref Range: 0 - 149 mg/dL 155 (H) 252 (H)   HDL Latest Ref Range: >=40 mg/dL 31 (A) 33 (A)   LDL Latest Ref Range: <100 mg/dL 139 (H) 118 (H)

## 2018-12-28 NOTE — ASSESSMENT & PLAN NOTE
Has hx of elevated LFTs many years ago, but has been stable for at least 5 years now. Hepatitis panel negative 2013.  US 2013 revealed fatty liver.    Ref. Range 12/17/2018 08:34   AST(SGOT) Latest Ref Range: 12 - 45 U/L 51 (H)   ALT(SGPT) Latest Ref Range: 2 - 50 U/L 115 (H)   Alkaline Phosphatase Latest Ref Range: 30 - 99 U/L 56

## 2018-12-28 NOTE — ASSESSMENT & PLAN NOTE
Controlled: yes  Medications: metformin 1,000 mg BID, Lantus 48 units at bedtime, and glipizide 10 mg BID  Hypoglycemia episodes: none  Statin: no   ACEI/ARB: no   Exercise: walking a lot   Nutrition: has not been able to find anything that has worked for him, but is currently on a keto-type diet where he has cut out breads and sugars improved, but then he got back into poor diet habits. States it is difficult to cook for his teenagers and him for dinner.   Denies any polyuria, polydipsia, polyphagia, blurred or cloudy vision, rash or thrush, or numbness or tingling of feet. Last dilated retinal eye exam was unknown   Ref. Range 4/10/2017 06:15 12/17/2018 08:34   Glycohemoglobin Latest Ref Range: 0.0 - 5.6 % 6.2 (H) 6.5 (H)   Estim. Avg Glu Latest Units: mg/dL 131 140      Ref. Range 12/17/2018 08:34   Micro Alb Creat Ratio Latest Ref Range: 0 - 30 mg/g 44 (H)   Creatinine, Urine Latest Units: mg/dL 210.90   Microalbumin, Urine Random Latest Units: mg/dL 9.2

## 2019-01-03 NOTE — PROGRESS NOTES
Subjective:     Chief Complaint   Patient presents with   • Diabetes     lab reivew       HPI  John Holguin Reyes is a 63 y.o. male here today for diabetes f/u. Normally sees BART Perea    DM type 2 (diabetes mellitus, type 2)  Controlled: yes  Medications: metformin 1,000 mg BID, Lantus 48 units at bedtime, and glipizide 10 mg BID  Hypoglycemia episodes: none  Statin: no   ACEI/ARB: no   Exercise: walking a lot   Nutrition: has not been able to find anything that has worked for him, but is currently on a keto-type diet where he has cut out breads and sugars improved, but then he got back into poor diet habits. States it is difficult to cook for his teenagers and him for dinner.   Denies any polyuria, polydipsia, polyphagia, blurred or cloudy vision, rash or thrush, or numbness or tingling of feet. Last dilated retinal eye exam was unknown   Ref. Range 4/10/2017 06:15 12/17/2018 08:34   Glycohemoglobin Latest Ref Range: 0.0 - 5.6 % 6.2 (H) 6.5 (H)   Estim. Avg Glu Latest Units: mg/dL 131 140      Ref. Range 12/17/2018 08:34   Micro Alb Creat Ratio Latest Ref Range: 0 - 30 mg/g 44 (H)   Creatinine, Urine Latest Units: mg/dL 210.90   Microalbumin, Urine Random Latest Units: mg/dL 9.2       Hyperlipidemia  Patient is diagnosed dyslipidemia with associated diabetes and is not on any statin therapy   Ref. Range 2/29/2016 09:04 12/17/2018 08:34   Cholesterol,Tot Latest Ref Range: 100 - 199 mg/dL 201 (H) 201 (H)   Triglycerides Latest Ref Range: 0 - 149 mg/dL 155 (H) 252 (H)   HDL Latest Ref Range: >=40 mg/dL 31 (A) 33 (A)   LDL Latest Ref Range: <100 mg/dL 139 (H) 118 (H)       Elevated liver enzymes  Has hx of elevated LFTs many years ago, but has been stable for at least 5 years now. Hepatitis panel negative 2013.  US 2013 revealed fatty liver.    Ref. Range 12/17/2018 08:34   AST(SGOT) Latest Ref Range: 12 - 45 U/L 51 (H)   ALT(SGPT) Latest Ref Range: 2 - 50 U/L 115 (H)   Alkaline Phosphatase Latest Ref  "Range: 30 - 99 U/L 56       BPH (benign prostatic hyperplasia)  Ongoing problem followed by Urology [place] annually. He will be seeing him next week.    Ref. Range 2/29/2016 09:01 12/1/2016 16:02 12/17/2018 08:34   Prostatic Specific Antigen Tot Latest Ref Range: 0.00 - 4.00 ng/mL 4.63 (H) 5.72 (H) 7.95 (H)        Diagnoses of Type 2 diabetes mellitus with microalbuminuria, without long-term current use of insulin (HCC), Mixed hyperlipidemia, Elevated liver enzymes, and Benign prostatic hyperplasia with nocturia were pertinent to this visit.    Allergies: Sulfa drugs and Septra [bactrim ds]  Current medicines (including changes today)  Current Outpatient Prescriptions   Medication Sig Dispense Refill   • glipiZIDE (GLUCOTROL) 10 MG Tab TAKE 1 TABLET BY MOUTH TWO  TIMES DAILY 180 Tab 1   • metformin (GLUCOPHAGE) 1000 MG tablet TAKE 1 TABLET BY MOUTH TWO  TIMES DAILY WITH MEALS 180 Tab 1   • insulin glargine (LANTUS SOLOSTAR) 100 UNIT/ML Solution Pen-injector injection Inject 28 Units as instructed every evening. 15 PEN 3   • Insulin Pen Needle (B-D UF III MINI PEN NEEDLES) 31G X 5 MM Misc USE DAILY DIAGNOSIS: E11.9 100 Each 3   • Blood Glucose Monitoring Suppl Misc Use  TID  with symptoms of high/low blood sugar. 100 Act 6   • tadalafil (CIALIS) 5 MG tablet Take 5 mg by mouth every day.       No current facility-administered medications for this visit.        He  has a past medical history of BPH (benign prostatic hyperplasia) (3/11/2010); Depression (11/4/2009); Diabetes; DIABETES MELLITUS (11/4/2009); Elevated liver enzymes (3/11/2010); Hyperlipidemia (11/4/2009); Hypertension; and Psychiatric disorder.        ROS  As stated in HPI and additionally  Gen: No F/C, fatigue, malaise, unintentional weight loss  Neuro: No HA or dizziness  CV: No chest pain, CAMPBELL, or LE edema  Endo: see HPI      Objective:     Blood pressure 124/78, pulse 78, temperature 36 °C (96.8 °F), temperature source Temporal, height 1.816 m (5' 11.5\"), " weight 110.7 kg (244 lb), SpO2 93 %. Body mass index is 33.56 kg/m².  Physical Exam:  General: Alert, oriented, in no acute distress.  Eye contact is good, speech goal directed, affect calm  CNs grossly intact.  HEENT: conjunctiva non-injected, sclera non-icteric, EOMs intact.   Gross hearing intact.  Oral mucous membranes pink and moist with no lesions. Oropharynx without erythema, or exudate.   Lungs: unlabored. clear to auscultation bilaterally with good excursion.  CV: regular rate and rhythm. No murmurs. No carotid bruits.   Ext: no edema, normal color   Skin: No rashes or lesions in visible areas  Gait steady.     Assessment and Plan:   Assessment/Plan:  1. Type 2 diabetes mellitus with microalbuminuria, without long-term current use of insulin (HCC)  Currently stable.  Continue current medications.  Increase exercise and increase healthy nutrition.  He will contact us if he starts having lower sugars and needs help adjusting his insulin.  - COMP METABOLIC PANEL; Future  - HEMOGLOBIN A1C; Future  - MICROALBUMIN CREAT RATIO URINE; Future  - REFERRAL TO OPHTHALMOLOGY    2. Mixed hyperlipidemia  Educated patient on recommendation to be on statin therapy to prevent coronary artery disease and stroke.  Reviewed a 10-year ASCVD risk with him as well.  Patient does not prefer to be on any statin therapy despite recommendations at this time.  He is going to work on his diet instead for healthy nutrition and weight loss.    3. Elevated liver enzymes  Worsened.  Discussed dietary changes and possible need for repeat ultrasound for monitoring.  We will repeat CMP in 3 months.    4. Benign prostatic hyperplasia with nocturia  stable continue follow-up with urology       Follow up:  Return in about 3 months (around 3/27/2019).    Educated in proper administration of medication(s) ordered today including safety, possible SE, risks, benefits, rationale and alternatives to therapy.   Supportive care, differential diagnoses,  and indications for immediate follow-up discussed with patient.    Pathogenesis of diagnosis discussed including typical length and natural progression.    Instructed to return to clinic or nearest emergency department for any change in condition, further concerns, or worsening of symptoms.  Patient states understanding of the plan of care and discharge instructions.      Please note that this dictation was created using voice recognition software. I have made every reasonable attempt to correct obvious errors, but I expect that there are errors of grammar and possibly content that I did not discover before finalizing the note.    Followup: Return in about 3 months (around 3/27/2019). sooner should new symptoms or problems arise.

## 2019-01-04 ENCOUNTER — TELEPHONE (OUTPATIENT)
Dept: MEDICAL GROUP | Facility: PHYSICIAN GROUP | Age: 64
End: 2019-01-04

## 2019-01-04 DIAGNOSIS — R80.9 TYPE 2 DIABETES MELLITUS WITH MICROALBUMINURIA, WITHOUT LONG-TERM CURRENT USE OF INSULIN (HCC): ICD-10-CM

## 2019-01-04 DIAGNOSIS — E11.29 TYPE 2 DIABETES MELLITUS WITH MICROALBUMINURIA, WITHOUT LONG-TERM CURRENT USE OF INSULIN (HCC): ICD-10-CM

## 2019-01-05 NOTE — TELEPHONE ENCOUNTER
1. Caller Name: John Holguin Reyes                                           Call Back Number: [phone] (home) [phone] (work)        Patient approves a detailed voicemail message: N\A    pt would like a new referral to a new eye doctor due to the he got referred to does not have times and dates that work for him. please advise?

## 2019-03-23 DIAGNOSIS — E11.9 DIABETES MELLITUS WITHOUT COMPLICATION (HCC): ICD-10-CM

## 2019-03-25 RX ORDER — INSULIN GLARGINE 100 [IU]/ML
INJECTION, SOLUTION SUBCUTANEOUS
Qty: 45 ML | Refills: 6 | Status: SHIPPED | OUTPATIENT
Start: 2019-03-25 | End: 2019-12-02 | Stop reason: SDUPTHER

## 2019-03-25 RX ORDER — GLIPIZIDE 10 MG/1
TABLET ORAL
Qty: 180 TAB | Refills: 0 | Status: SHIPPED | OUTPATIENT
Start: 2019-03-25 | End: 2019-06-17 | Stop reason: SDUPTHER

## 2019-03-25 NOTE — TELEPHONE ENCOUNTER
Requested Prescriptions     Signed Prescriptions Disp Refills   • metformin (GLUCOPHAGE) 1000 MG tablet 180 Tab 1     Sig: TAKE 1 TABLET BY MOUTH TWO  TIMES DAILY WITH MEALS     Authorizing Provider: CADENCE MCBRIDE   • glipiZIDE (GLUCOTROL) 10 MG Tab 180 Tab 0     Sig: TAKE 1 TABLET BY MOUTH TWO  TIMES DAILY     Authorizing Provider: CADENCE MCBRIDE A.P.R.N.

## 2019-03-25 NOTE — TELEPHONE ENCOUNTER
Requested Prescriptions     Signed Prescriptions Disp Refills   • LANTUS SOLOSTAR 100 UNIT/ML Solution Pen-injector injection 45 mL 6     Sig: INJECT SUBCUTANEOUSLY 42  UNITS AS INSTRUCTED EVERY  EVENING     Authorizing Provider: CADENCE MCBRIDE A.P.R.N.

## 2019-04-25 NOTE — TELEPHONE ENCOUNTER
Was   the patient seen in the last year in this department? Yes LOV 12/27/18 W/[facility] A1C 12/17/18 (6.5) New labs ordered not complete No up coming melissa schedule was due to come in March 2019    Does patient have an active prescription for medications requested? No     Received Request Via: Pharmacy

## 2019-04-25 NOTE — TELEPHONE ENCOUNTER
Requested Prescriptions     Pending Prescriptions Disp Refills   • Insulin Pen Needle (B-D UF III MINI PEN NEEDLES) 31G X 5 MM Misc 100 Each 0     Sig: USE DAILY DIAGNOSIS: E11.9       ZARIA Luu.

## 2019-06-17 DIAGNOSIS — E11.9 DIABETES MELLITUS WITHOUT COMPLICATION (HCC): ICD-10-CM

## 2019-06-17 RX ORDER — GLIPIZIDE 10 MG/1
TABLET ORAL
Qty: 180 TAB | Refills: 1 | Status: SHIPPED | OUTPATIENT
Start: 2019-06-17 | End: 2019-11-26 | Stop reason: SDUPTHER

## 2019-06-17 NOTE — TELEPHONE ENCOUNTER
Requested Prescriptions     Signed Prescriptions Disp Refills   • glipiZIDE (GLUCOTROL) 10 MG Tab 180 Tab 1     Sig: TAKE 1 TABLET BY MOUTH TWO  TIMES DAILY     Authorizing Provider: CADENCE MCBRIDE A.P.R.N.

## 2019-07-18 RX ORDER — FLURBIPROFEN SODIUM 0.3 MG/ML
SOLUTION/ DROPS OPHTHALMIC
Qty: 90 EACH | Refills: 1 | Status: SHIPPED | OUTPATIENT
Start: 2019-07-18 | End: 2020-01-24

## 2019-07-18 NOTE — TELEPHONE ENCOUNTER
Requested Prescriptions     Signed Prescriptions Disp Refills   • B-D UF III MINI PEN NEEDLES 31G X 5 MM Misc 90 Each 1     Sig: USE 1 PENNEEDLE DAILY     Authorizing Provider: CADENCE MCBRIDE   • metformin (GLUCOPHAGE) 1000 MG tablet 180 Tab 1     Sig: TAKE 1 TABLET BY MOUTH TWO  TIMES DAILY WITH MEALS     Authorizing Provider: CADENCE MCBRIDE A.P.R.N.

## 2019-11-12 NOTE — TELEPHONE ENCOUNTER
Was the patient seen in the last year in this department? No  LOV 4/24/18    Does patient have an active prescription for medications requested? Yes    Received Request Via: Patient       Patient is requesting an emergency 5 day supply, he is out but has rx on the way from [facility] rx mail service. Pt would like rx sent to [facility] on [address] Please advise.

## 2019-11-12 NOTE — TELEPHONE ENCOUNTER
Requested Prescriptions     Signed Prescriptions Disp Refills   • metformin (GLUCOPHAGE) 1000 MG tablet 10 Tab 0     Sig: TAKE 1 TABLET BY MOUTH TWO  TIMES DAILY WITH MEALS     Authorizing Provider: CADENCE MCBRIDE A.P.R.N.

## 2019-11-18 DIAGNOSIS — E11.9 DIABETES MELLITUS WITHOUT COMPLICATION (HCC): ICD-10-CM

## 2019-11-18 RX ORDER — GLIPIZIDE 10 MG/1
TABLET ORAL
Qty: 180 TAB | Refills: 0 | OUTPATIENT
Start: 2019-11-18

## 2019-11-18 NOTE — TELEPHONE ENCOUNTER
Requested Prescriptions     Refused Prescriptions Disp Refills   • glipiZIDE (GLUCOTROL) 10 MG Tab [Pharmacy Med Name: GLIPIZIDE  10MG  TAB] 180 Tab 0     Sig: TAKE 1 TABLET BY MOUTH TWO  TIMES DAILY     Refused By: CADENCE MCBRIDE     Reason for Refusal: Patient needs appointment.     Patient needs appointment please  RUCHI Perea

## 2019-11-18 NOTE — TELEPHONE ENCOUNTER
Was the patient seen in the last year in this department? Yes BY SHILA 12/17/18 LAST SAW CADENCE 4/24/18    Does patient have an active prescription for medications requested? No     Received Request Via: Pharmacy

## 2019-11-20 NOTE — TELEPHONE ENCOUNTER
Phone Number Called: [phone] (home)       Call outcome: left message for patient to call back regarding message below    Message:

## 2019-11-25 ENCOUNTER — TELEPHONE (OUTPATIENT)
Dept: MEDICAL GROUP | Facility: PHYSICIAN GROUP | Age: 64
End: 2019-11-25

## 2019-11-25 DIAGNOSIS — E11.29 TYPE 2 DIABETES MELLITUS WITH MICROALBUMINURIA, WITHOUT LONG-TERM CURRENT USE OF INSULIN (HCC): Primary | ICD-10-CM

## 2019-11-25 DIAGNOSIS — R80.9 TYPE 2 DIABETES MELLITUS WITH MICROALBUMINURIA, WITHOUT LONG-TERM CURRENT USE OF INSULIN (HCC): Primary | ICD-10-CM

## 2019-11-25 NOTE — TELEPHONE ENCOUNTER
VOICEMAIL  1. Caller Name: Asaf                      Call Back Number: [phone] (home)       2. Message: Patient is wondering if he needs to get labs done prior to his 12/2 appointment? If so please place orders. Thank you     3. Patient approves office to leave a detailed voicemail/MyChart message: N\A

## 2019-11-25 NOTE — TELEPHONE ENCOUNTER
Fasting lab orders are in the computer.  Thank you for doing these prior to our upcoming appointment  ZARIA Perea.

## 2019-11-26 ENCOUNTER — HOSPITAL ENCOUNTER (OUTPATIENT)
Dept: LAB | Facility: MEDICAL CENTER | Age: 64
End: 2019-11-26
Attending: NURSE PRACTITIONER
Payer: COMMERCIAL

## 2019-11-26 DIAGNOSIS — R80.9 TYPE 2 DIABETES MELLITUS WITH MICROALBUMINURIA, WITHOUT LONG-TERM CURRENT USE OF INSULIN (HCC): ICD-10-CM

## 2019-11-26 DIAGNOSIS — E11.9 DIABETES MELLITUS WITHOUT COMPLICATION (HCC): ICD-10-CM

## 2019-11-26 DIAGNOSIS — E11.29 TYPE 2 DIABETES MELLITUS WITH MICROALBUMINURIA, WITHOUT LONG-TERM CURRENT USE OF INSULIN (HCC): ICD-10-CM

## 2019-11-26 LAB
25(OH)D3 SERPL-MCNC: 18 NG/ML (ref 30–100)
ALBUMIN SERPL BCP-MCNC: 4.5 G/DL (ref 3.2–4.9)
ALBUMIN/GLOB SERPL: 1.9 G/DL
ALP SERPL-CCNC: 59 U/L (ref 30–99)
ALT SERPL-CCNC: 86 U/L (ref 2–50)
ANION GAP SERPL CALC-SCNC: 7 MMOL/L (ref 0–11.9)
AST SERPL-CCNC: 46 U/L (ref 12–45)
BASOPHILS # BLD AUTO: 1.1 % (ref 0–1.8)
BASOPHILS # BLD: 0.09 K/UL (ref 0–0.12)
BILIRUB SERPL-MCNC: 0.7 MG/DL (ref 0.1–1.5)
BUN SERPL-MCNC: 15 MG/DL (ref 8–22)
CALCIUM SERPL-MCNC: 9.2 MG/DL (ref 8.5–10.5)
CHLORIDE SERPL-SCNC: 109 MMOL/L (ref 96–112)
CHOLEST SERPL-MCNC: 216 MG/DL (ref 100–199)
CO2 SERPL-SCNC: 26 MMOL/L (ref 20–33)
CREAT SERPL-MCNC: 1.15 MG/DL (ref 0.5–1.4)
CREAT UR-MCNC: 163.6 MG/DL
EOSINOPHIL # BLD AUTO: 0.26 K/UL (ref 0–0.51)
EOSINOPHIL NFR BLD: 3.2 % (ref 0–6.9)
ERYTHROCYTE [DISTWIDTH] IN BLOOD BY AUTOMATED COUNT: 47.5 FL (ref 35.9–50)
EST. AVERAGE GLUCOSE BLD GHB EST-MCNC: 151 MG/DL
FASTING STATUS PATIENT QL REPORTED: NORMAL
GLOBULIN SER CALC-MCNC: 2.4 G/DL (ref 1.9–3.5)
GLUCOSE SERPL-MCNC: 112 MG/DL (ref 65–99)
HBA1C MFR BLD: 6.9 % (ref 0–5.6)
HCT VFR BLD AUTO: 49.3 % (ref 42–52)
HDLC SERPL-MCNC: 36 MG/DL
HGB BLD-MCNC: 16.4 G/DL (ref 14–18)
IMM GRANULOCYTES # BLD AUTO: 0.03 K/UL (ref 0–0.11)
IMM GRANULOCYTES NFR BLD AUTO: 0.4 % (ref 0–0.9)
LDLC SERPL CALC-MCNC: 144 MG/DL
LYMPHOCYTES # BLD AUTO: 2.01 K/UL (ref 1–4.8)
LYMPHOCYTES NFR BLD: 24.4 % (ref 22–41)
MCH RBC QN AUTO: 30.6 PG (ref 27–33)
MCHC RBC AUTO-ENTMCNC: 33.3 G/DL (ref 33.7–35.3)
MCV RBC AUTO: 92 FL (ref 81.4–97.8)
MICROALBUMIN UR-MCNC: 2.9 MG/DL
MICROALBUMIN/CREAT UR: 18 MG/G (ref 0–30)
MONOCYTES # BLD AUTO: 0.63 K/UL (ref 0–0.85)
MONOCYTES NFR BLD AUTO: 7.7 % (ref 0–13.4)
NEUTROPHILS # BLD AUTO: 5.21 K/UL (ref 1.82–7.42)
NEUTROPHILS NFR BLD: 63.2 % (ref 44–72)
NRBC # BLD AUTO: 0 K/UL
NRBC BLD-RTO: 0 /100 WBC
PLATELET # BLD AUTO: 278 K/UL (ref 164–446)
PMV BLD AUTO: 10.2 FL (ref 9–12.9)
POTASSIUM SERPL-SCNC: 4.8 MMOL/L (ref 3.6–5.5)
PROT SERPL-MCNC: 6.9 G/DL (ref 6–8.2)
RBC # BLD AUTO: 5.36 M/UL (ref 4.7–6.1)
SODIUM SERPL-SCNC: 142 MMOL/L (ref 135–145)
TRIGL SERPL-MCNC: 180 MG/DL (ref 0–149)
TSH SERPL DL<=0.005 MIU/L-ACNC: 2.49 UIU/ML (ref 0.38–5.33)
WBC # BLD AUTO: 8.2 K/UL (ref 4.8–10.8)

## 2019-11-26 PROCEDURE — 80053 COMPREHEN METABOLIC PANEL: CPT

## 2019-11-26 PROCEDURE — 83036 HEMOGLOBIN GLYCOSYLATED A1C: CPT

## 2019-11-26 PROCEDURE — 82043 UR ALBUMIN QUANTITATIVE: CPT

## 2019-11-26 PROCEDURE — 36415 COLL VENOUS BLD VENIPUNCTURE: CPT

## 2019-11-26 PROCEDURE — 85025 COMPLETE CBC W/AUTO DIFF WBC: CPT

## 2019-11-26 PROCEDURE — 82306 VITAMIN D 25 HYDROXY: CPT

## 2019-11-26 PROCEDURE — 82570 ASSAY OF URINE CREATININE: CPT

## 2019-11-26 PROCEDURE — 80061 LIPID PANEL: CPT

## 2019-11-26 PROCEDURE — 84443 ASSAY THYROID STIM HORMONE: CPT

## 2019-11-27 NOTE — TELEPHONE ENCOUNTER
Was the patient seen in the last year in this department? Yes 12/27/18 has an up coming appt     Does patient have an active prescription for medications requested? No     Received Request Via: Pharmacy

## 2019-12-01 RX ORDER — GLIPIZIDE 10 MG/1
TABLET ORAL
Qty: 180 TAB | Refills: 0 | Status: SHIPPED | OUTPATIENT
Start: 2019-12-01 | End: 2020-02-25

## 2019-12-02 ENCOUNTER — OFFICE VISIT (OUTPATIENT)
Dept: MEDICAL GROUP | Facility: PHYSICIAN GROUP | Age: 64
End: 2019-12-02
Payer: COMMERCIAL

## 2019-12-02 VITALS
TEMPERATURE: 97.6 F | HEIGHT: 72 IN | OXYGEN SATURATION: 94 % | WEIGHT: 242 LBS | BODY MASS INDEX: 32.78 KG/M2 | DIASTOLIC BLOOD PRESSURE: 86 MMHG | HEART RATE: 65 BPM | RESPIRATION RATE: 14 BRPM | SYSTOLIC BLOOD PRESSURE: 142 MMHG

## 2019-12-02 DIAGNOSIS — E78.2 MIXED HYPERLIPIDEMIA: ICD-10-CM

## 2019-12-02 DIAGNOSIS — N40.1 BENIGN PROSTATIC HYPERPLASIA WITH NOCTURIA: ICD-10-CM

## 2019-12-02 DIAGNOSIS — R35.1 BENIGN PROSTATIC HYPERPLASIA WITH NOCTURIA: ICD-10-CM

## 2019-12-02 DIAGNOSIS — R80.9 TYPE 2 DIABETES MELLITUS WITH MICROALBUMINURIA, WITHOUT LONG-TERM CURRENT USE OF INSULIN (HCC): ICD-10-CM

## 2019-12-02 DIAGNOSIS — E11.29 TYPE 2 DIABETES MELLITUS WITH MICROALBUMINURIA, WITHOUT LONG-TERM CURRENT USE OF INSULIN (HCC): ICD-10-CM

## 2019-12-02 DIAGNOSIS — R97.20 ELEVATED PSA: ICD-10-CM

## 2019-12-02 DIAGNOSIS — R74.8 ELEVATED LIVER ENZYMES: ICD-10-CM

## 2019-12-02 DIAGNOSIS — Z91.89 OTHER SPECIFIED PERSONAL RISK FACTORS, NOT ELSEWHERE CLASSIFIED: ICD-10-CM

## 2019-12-02 DIAGNOSIS — E66.9 OBESITY (BMI 30-39.9): ICD-10-CM

## 2019-12-02 PROCEDURE — 99214 OFFICE O/P EST MOD 30 MIN: CPT | Performed by: NURSE PRACTITIONER

## 2019-12-02 RX ORDER — LISINOPRIL 10 MG/1
10 TABLET ORAL DAILY
Qty: 90 TAB | Refills: 3 | Status: SHIPPED | OUTPATIENT
Start: 2019-12-02 | End: 2020-10-05

## 2019-12-02 ASSESSMENT — PATIENT HEALTH QUESTIONNAIRE - PHQ9
CLINICAL INTERPRETATION OF PHQ2 SCORE: 3
5. POOR APPETITE OR OVEREATING: 3 - NEARLY EVERY DAY
SUM OF ALL RESPONSES TO PHQ QUESTIONS 1-9: 13

## 2019-12-02 NOTE — PROGRESS NOTES
Chief Complaint   Patient presents with   • Annual Exam       Subjective:   John Holguin Reyes is a 64 y.o. male here today for evaluation and management of:    Elevated PSA  Has had history of elevated PSA.  Seeing urology yearly.  Due for visit this month.     DM type 2 (diabetes mellitus, type 2)  Taking metformin 1000 mg bid, glipizide 10 mg bid and lantus at 60 mg nightly.  Not currently checking blood sugars. Has, in the past, managed with diet control.  Recently has not been doing this.  Not currently on ACE/ARB or STATIN.  No chest pain reported.  Reviewed lab work with A1c 6.9.  Not currently exercising.        Obesity (BMI 30-39.9)  BMI 33.28.     BPH (benign prostatic hyperplasia)  Yearly check with urology.  Due this month.     Elevated liver enzymes  Results for REYES, JOHN HOLGUIN (MRN [id number]) as of 12/2/2019 10:56   Ref. Range 11/26/2019 07:00 11/26/2019 07:01   Calcium Latest Ref Range: 8.5 - 10.5 mg/dL 9.2    AST(SGOT) Latest Ref Range: 12 - 45 U/L 46 (H)    ALT(SGPT) Latest Ref Range: 2 - 50 U/L 86 (H)    Alkaline Phosphatase Latest Ref Range: 30 - 99 U/L 59    Elevated but improved from last year.  Rare alcohol use reported.  No abdominal pain.      Hyperlipidemia  Results for REYES, JOHN HOLGUIN (MRN [id number]) as of 12/2/2019 10:56   Ref. Range 11/26/2019 07:00   Cholesterol,Tot Latest Ref Range: 100 - 199 mg/dL 216 (H)   Triglycerides Latest Ref Range: 0 - 149 mg/dL 180 (H)   HDL Latest Ref Range: >=40 mg/dL 36 (A)   LDL Latest Ref Range: <100 mg/dL 144 (H)   Not currently on statin.  Recommended CT cardiac score as he is not convinced he needs to do a statin.  Discussed ADA recommendations.      Patient has not been seen for over one year    Current medicines (including changes today)  Current Outpatient Medications   Medication Sig Dispense Refill   • Multiple Vitamin (MULTI-VITAMIN DAILY PO) Take  by mouth.     • CINNAMON PO Take  by mouth.     • GLUCOSAMINE HCL PO Take  by mouth.     •  "lisinopril (PRINIVIL) 10 MG Tab Take 1 Tab by mouth every day. 90 Tab 3   • metformin (GLUCOPHAGE) 1000 MG tablet TAKE 1 TABLET BY MOUTH TWO  TIMES DAILY WITH MEALS 180 Tab 3   • insulin glargine (LANTUS SOLOSTAR) 100 UNIT/ML Solution Pen-injector injection Inject 60 units sub q every evening as directed. 20 PEN 6   • glipiZIDE (GLUCOTROL) 10 MG Tab TAKE 1 TABLET BY MOUTH TWO  TIMES DAILY 180 Tab 0   • B-D UF III MINI PEN NEEDLES 31G X 5 MM Misc USE 1 PENNEEDLE DAILY 90 Each 1   • Blood Glucose Monitoring Suppl Misc Use  TID  with symptoms of high/low blood sugar. 100 Act 6   • tadalafil (CIALIS) 5 MG tablet Take 5 mg by mouth every day.       No current facility-administered medications for this visit.      He  has a past medical history of BPH (benign prostatic hyperplasia) (3/11/2010), Depression (11/4/2009), Diabetes, DIABETES MELLITUS (11/4/2009), Elevated liver enzymes (3/11/2010), Hyperlipidemia (11/4/2009), Hypertension, and Psychiatric disorder.    ROS as stated in hpi  No chest pain, no shortness of breath, no abdominal pain       Objective:     /86   Pulse 65   Temp 36.4 °C (97.6 °F) (Temporal)   Resp 14   Ht 1.816 m (5' 11.5\")   Wt 109.8 kg (242 lb)   SpO2 94%  Body mass index is 33.28 kg/m². bp slightly elevated today.   Physical Exam:  Constitutional: Alert, no distress.  Skin: Warm, dry, good turgor,no cyanosis, no rashes in visible areas.  Eye: Equal, round and reactive, conjunctiva clear, lids normal.  Ears: No tenderness, no discharge.  External canals are without any significant edema or erythema..  Gross auditory acuity is intact.  Nose: symmetrical without tenderness, no discharge.  Mouth/Throat: lips without lesion.  Oropharynx clear.  Neck: Trachea midline, no masses, no obvious thyroid enlargement..  Range of motion within normal limits.  Neuro: Cranial nerves 2-12 grossly intact.  No sensory deficit.  Respiratory: Unlabored respiratory effort, lungs clear to auscultation, no " wheezes, no ronchi.  Cardiovascular: Normal S1, S2, no murmur, no edema.  Psych: Alert and oriented x3, normal affect and mood and judgement.        Assessment and Plan:   The following treatment plan was discussed    1. Elevated PSA  Chronic, ongoing. Followed by urology with yearly visit.  Due this month.  Dr. Martinez    2. Type 2 diabetes mellitus with microalbuminuria, without long-term current use of insulin (HCC)  Chronic, ongoing. Not well controlled.  Increasing insulin dosages without check blood sugars.  Recommended check BS twice weekly along with blood pressure.  Diet, exercise and weight loss goals discussed.  Discussed statin.  CT cardiac score ordered.  Will base statin based on those results.  Monitor and follow.  Return [place] 6 [facility]     3. Obesity (BMI 30-39.9)  Chronic, ongoing. BMI 33.26.  For adults, the American Heart Association recommends:    Consume a dietary pattern that emphasizes intake of vegetables, fruits, and whole grains; includes low-fat dairy products, poultry, fish, legumes, nontropical vegetable oils and nuts; and limits intake of sweets, sugar-sweetened beverages and red meats.  Look up DASH diet for more information.    Aim for a dietary pattern that achieves 5% to 6% of calories from saturated fat.     Reduce/eliminate percent of calories from trans fat     Consume no more than 2,400 mg of sodium/day     Engage in aerobic physical activity, 3 to 4 sessions a week, lasting on average 40 minutes per session, and involving moderate-to-vigorous intensity physical activity.      4. Other specified personal risk factors, not elsewhere classified  Chronic, ongoing.  Elevated cholesterol.  Patient is reluctant to try statin.  Will order CT cardiac score.  Monitor, follow this result.    - CT-CARDIAC SCORING; Future    5. Benign prostatic hyperplasia with nocturia  Chronic, ongoing. Followed by urology.  Yearly visit upcoming.     6. Elevated liver enzymes  Chornic, ongoing, improving.   This may be due to elevated triglycerides.  Will continue to monitor.  Consider hep screening or ultrasound.  Monitor and follow.     7. Mixed hyperlipidemia  Chronic, ongoing. See #4      Followup: Return in about 6 months (around 6/2/2020) for A1c check/cholesterol.         Educated in proper administration of medication(s) ordered today including safety, possible SE, risks, benefits, rationale and alternatives to therapy.     Please note that this dictation was created using voice recognition software. I have made every reasonable attempt to correct obvious errors, but I expect that there are errors of grammar and possibly content that I did not discover before finalizing the note.

## 2019-12-02 NOTE — ASSESSMENT & PLAN NOTE
Results for REYES, JOHN HOLGUIN (MRN [id number]) as of 12/2/2019 10:56   Ref. Range 11/26/2019 07:00 11/26/2019 07:01   Calcium Latest Ref Range: 8.5 - 10.5 mg/dL 9.2    AST(SGOT) Latest Ref Range: 12 - 45 U/L 46 (H)    ALT(SGPT) Latest Ref Range: 2 - 50 U/L 86 (H)    Alkaline Phosphatase Latest Ref Range: 30 - 99 U/L 59    Elevated but improved from last year.  Rare alcohol use reported.  No abdominal pain.

## 2019-12-02 NOTE — ASSESSMENT & PLAN NOTE
Results for REYES, JOHN HOLGUIN (MRN [id number]) as of 12/2/2019 10:56   Ref. Range 11/26/2019 07:00   Cholesterol,Tot Latest Ref Range: 100 - 199 mg/dL 216 (H)   Triglycerides Latest Ref Range: 0 - 149 mg/dL 180 (H)   HDL Latest Ref Range: >=40 mg/dL 36 (A)   LDL Latest Ref Range: <100 mg/dL 144 (H)   Not currently on statin.  Recommended CT cardiac score as he is not convinced he needs to do a statin.  Discussed ADA recommendations.

## 2019-12-02 NOTE — TELEPHONE ENCOUNTER
Requested Prescriptions     Signed Prescriptions Disp Refills   • glipiZIDE (GLUCOTROL) 10 MG Tab 180 Tab 0     Sig: TAKE 1 TABLET BY MOUTH TWO  TIMES DAILY     Authorizing Provider: CADENCE MCBRIDE A.P.R.N.

## 2019-12-02 NOTE — ASSESSMENT & PLAN NOTE
Taking metformin 1000 mg bid, glipizide 10 mg bid and lantus at 60 mg nightly.  Not currently checking blood sugars. Has, in the past, managed with diet control.  Recently has not been doing this.  Not currently on ACE/ARB or STATIN.  No chest pain reported.  Reviewed lab work with A1c 6.9.  Not currently exercising.

## 2020-01-15 ENCOUNTER — HOSPITAL ENCOUNTER (OUTPATIENT)
Dept: LAB | Facility: MEDICAL CENTER | Age: 65
End: 2020-01-15
Attending: PHYSICIAN ASSISTANT
Payer: COMMERCIAL

## 2020-01-15 LAB — PSA SERPL-MCNC: 7.57 NG/ML (ref 0–4)

## 2020-01-15 PROCEDURE — 84153 ASSAY OF PSA TOTAL: CPT

## 2020-01-15 PROCEDURE — 36415 COLL VENOUS BLD VENIPUNCTURE: CPT

## 2020-01-24 NOTE — TELEPHONE ENCOUNTER
Was the patient seen in the last year in this department? Yes LOV 12/02/19    Does patient have an active prescription for medications requested? No     Received Request Via: Pharmacy

## 2020-01-26 RX ORDER — FLURBIPROFEN SODIUM 0.3 MG/ML
SOLUTION/ DROPS OPHTHALMIC
Qty: 100 EACH | Refills: 3 | Status: SHIPPED | OUTPATIENT
Start: 2020-01-26 | End: 2021-01-28

## 2020-01-26 NOTE — TELEPHONE ENCOUNTER
Requested Prescriptions     Signed Prescriptions Disp Refills   • B-D UF III MINI PEN NEEDLES 31G X 5 MM Misc 100 Each 3     Sig: USE 1 PENNEEDLE DAILY     Authorizing Provider: CADENCE MCBRIDE A.P.R.N.

## 2020-02-24 DIAGNOSIS — E11.9 DIABETES MELLITUS WITHOUT COMPLICATION (HCC): ICD-10-CM

## 2020-02-25 RX ORDER — GLIPIZIDE 10 MG/1
TABLET ORAL
Qty: 180 TAB | Refills: 1 | Status: SHIPPED | OUTPATIENT
Start: 2020-02-25 | End: 2020-07-27

## 2020-06-01 ENCOUNTER — OFFICE VISIT (OUTPATIENT)
Dept: MEDICAL GROUP | Facility: PHYSICIAN GROUP | Age: 65
End: 2020-06-01
Payer: MEDICARE

## 2020-06-01 VITALS
SYSTOLIC BLOOD PRESSURE: 124 MMHG | BODY MASS INDEX: 32.91 KG/M2 | RESPIRATION RATE: 16 BRPM | WEIGHT: 243 LBS | HEIGHT: 72 IN | HEART RATE: 57 BPM | TEMPERATURE: 97.6 F | OXYGEN SATURATION: 93 % | DIASTOLIC BLOOD PRESSURE: 76 MMHG

## 2020-06-01 DIAGNOSIS — E78.2 MIXED HYPERLIPIDEMIA: ICD-10-CM

## 2020-06-01 DIAGNOSIS — N40.1 BENIGN PROSTATIC HYPERPLASIA WITH NOCTURIA: ICD-10-CM

## 2020-06-01 DIAGNOSIS — R80.9 TYPE 2 DIABETES MELLITUS WITH MICROALBUMINURIA, WITHOUT LONG-TERM CURRENT USE OF INSULIN (HCC): ICD-10-CM

## 2020-06-01 DIAGNOSIS — E11.29 TYPE 2 DIABETES MELLITUS WITH MICROALBUMINURIA, WITHOUT LONG-TERM CURRENT USE OF INSULIN (HCC): ICD-10-CM

## 2020-06-01 DIAGNOSIS — R35.1 BENIGN PROSTATIC HYPERPLASIA WITH NOCTURIA: ICD-10-CM

## 2020-06-01 DIAGNOSIS — E66.9 OBESITY (BMI 30-39.9): ICD-10-CM

## 2020-06-01 DIAGNOSIS — R97.20 ELEVATED PSA: ICD-10-CM

## 2020-06-01 DIAGNOSIS — Z87.828 H/O BACK INJURY: ICD-10-CM

## 2020-06-01 LAB
HBA1C MFR BLD: 6.5 % (ref 0–5.6)
INT CON NEG: NEGATIVE
INT CON POS: POSITIVE

## 2020-06-01 PROCEDURE — 99214 OFFICE O/P EST MOD 30 MIN: CPT | Performed by: NURSE PRACTITIONER

## 2020-06-01 PROCEDURE — 83036 HEMOGLOBIN GLYCOSYLATED A1C: CPT | Performed by: NURSE PRACTITIONER

## 2020-06-01 RX ORDER — ROSUVASTATIN CALCIUM 20 MG/1
20 TABLET, COATED ORAL EVERY EVENING
Qty: 30 TAB | Refills: 11 | Status: SHIPPED
Start: 2020-06-01 | End: 2020-06-01 | Stop reason: SDUPTHER

## 2020-06-01 RX ORDER — ROSUVASTATIN CALCIUM 20 MG/1
20 TABLET, COATED ORAL EVERY EVENING
Qty: 90 TAB | Refills: 3 | Status: SHIPPED | OUTPATIENT
Start: 2020-06-01 | End: 2021-03-30

## 2020-06-01 ASSESSMENT — FIBROSIS 4 INDEX: FIB4 SCORE: 1.16

## 2020-06-01 ASSESSMENT — PATIENT HEALTH QUESTIONNAIRE - PHQ9
CLINICAL INTERPRETATION OF PHQ2 SCORE: 3
SUM OF ALL RESPONSES TO PHQ QUESTIONS 1-9: 11
5. POOR APPETITE OR OVEREATING: 2 - MORE THAN HALF THE DAYS

## 2020-06-01 NOTE — ASSESSMENT & PLAN NOTE
Taking metformin and glipizide bid.  Lantus 60 units at bedtime.  Blood sugars running around 110. Checking intermittently.  Walking intermittently at work.  Not currently on a statin. Will start on crestor 20 mg.   A1c 6.5 today

## 2020-06-01 NOTE — ASSESSMENT & PLAN NOTE
Reports new flank pain right side.  2 days reported.  Resolved.  No fever, chills, painful urination reported.

## 2020-06-01 NOTE — TELEPHONE ENCOUNTER
Received request via: Patient    Was the patient seen in the last year in this department? Yes    Does the patient have an active prescription (recently filled or refills available) for medication(s) requested? yes needs it sent to mail order for 90 days

## 2020-06-01 NOTE — TELEPHONE ENCOUNTER
Requested Prescriptions     Signed Prescriptions Disp Refills   • rosuvastatin (CRESTOR) 20 MG Tab 90 Tab 3     Sig: Take 1 Tab by mouth every evening.     Authorizing Provider: CADENCE MCBRIDE A.P.R.N.

## 2020-06-01 NOTE — PROGRESS NOTES
Chief Complaint   Patient presents with   • Follow-Up   • Diabetes   • Hyperlipidemia   • Back Pain   • Hypertension       Subjective:   John Holguin Reyes is a 65 y.o. male here today for evaluation and management of:    DM type 2 (diabetes mellitus, type 2)  Taking metformin and glipizide bid.  Lantus 60 units at bedtime.  Blood sugars running around 110. Checking intermittently.  Walking intermittently at work.  Not currently on a statin. Will start on crestor 20 mg.   A1c 6.5 today    Elevated PSA  Being followed by Dr. Daniel.  Last psa 7.5  Annual check    H/O back injury  Reports new flank pain right side.  2 days reported.  Resolved.  No fever, chills, painful urination reported.     Hyperlipidemia  Elevated lipids for several years.  Will start crestor.      Obesity (BMI 30-39.9)  BMI 33.42 today.  Encouraged to do cardiovascular exercise.  Patient wants to lose weight     BPH (benign prostatic hyperplasia)  Followed by Dr. Acosta yearly.  Seen in January.            Current medicines (including changes today)  Current Outpatient Medications   Medication Sig Dispense Refill   • rosuvastatin (CRESTOR) 20 MG Tab Take 1 Tab by mouth every evening. 30 Tab 11   • glipiZIDE (GLUCOTROL) 10 MG Tab TAKE 1 TABLET BY MOUTH TWO  TIMES DAILY 180 Tab 1   • B-D UF III MINI PEN NEEDLES 31G X 5 MM Misc USE 1 PENNEEDLE DAILY 100 Each 3   • Multiple Vitamin (MULTI-VITAMIN DAILY PO) Take  by mouth.     • CINNAMON PO Take  by mouth.     • GLUCOSAMINE HCL PO Take  by mouth.     • lisinopril (PRINIVIL) 10 MG Tab Take 1 Tab by mouth every day. 90 Tab 3   • metformin (GLUCOPHAGE) 1000 MG tablet TAKE 1 TABLET BY MOUTH TWO  TIMES DAILY WITH MEALS 180 Tab 3   • insulin glargine (LANTUS SOLOSTAR) 100 UNIT/ML Solution Pen-injector injection Inject 60 units sub q every evening as directed. 20 PEN 6   • Blood Glucose Monitoring Suppl Misc Use  TID  with symptoms of high/low blood sugar. 100 Act 6   • tadalafil (CIALIS) 5 MG tablet  "Take 5 mg by mouth every day.       No current facility-administered medications for this visit.      He  has a past medical history of BPH (benign prostatic hyperplasia) (3/11/2010), Depression (11/4/2009), Diabetes, DIABETES MELLITUS (11/4/2009), Elevated liver enzymes (3/11/2010), Hyperlipidemia (11/4/2009), Hypertension, and Psychiatric disorder.    ROS as stated in hpi  No chest pain, no shortness of breath, no abdominal pain       Objective:     /76   Pulse (!) 57   Temp 36.4 °C (97.6 °F) (Temporal)   Resp 16   Ht 1.816 m (5' 11.5\")   Wt 110.2 kg (243 lb)   SpO2 93%  Body mass index is 33.42 kg/m². stable.   Physical Exam:  Constitutional: Alert, no distress.  Skin: Warm, dry, good turgor,no cyanosis, no rashes in visible areas.  Eye: Equal, round and reactive, conjunctiva clear, lids normal.  Ears: No tenderness, no discharge.  External canals are without any significant edema or erythema.  Gross auditory acuity is intact.  Nose: symmetrical without tenderness, no discharge.  Mouth/Throat: lips without lesion.  Oropharynx clear.  Neck: Trachea midline, no masses, no obvious thyroid enlargement.. No cervical or supraclavicular lymphadenopathy. Range of motion within normal limits.  Neuro: Cranial nerves 2-12 grossly intact.  No sensory deficit.  Respiratory: Unlabored respiratory effort, lungs clear to auscultation, no wheezes, no ronchi.  Cardiovascular: Normal S1, S2, no murmur, no edema.  Psych: Alert and oriented x3, normal affect and mood and judgement.        Assessment and Plan:   The following treatment plan was discussed    1. Type 2 diabetes mellitus with microalbuminuria, without long-term current use of insulin (HCC)  Chronic, ongoing. A1c 6.5 continue current medications.  Add crestor 20 mg for cholesterol.  Encouraged weight loss and walking.   - POCT  A1C  - CBC WITH DIFFERENTIAL; Future  - Comp Metabolic Panel; Future  - HEMOGLOBIN A1C; Future  - MICROALBUMIN CREAT RATIO URINE; " Future  - Lipid Profile; Future  - TSH; Future  - VITAMIN D,25 HYDROXY; Future    2. Elevated PSA  Chornic, ongoing, followed by urology, yearly  Monitor.     3. H/O back injury  Chronic, ongoing. Recent flare, but has resolved.  Discussed walking, weight loss to help with back pain.     4. Mixed hyperlipidemia  Chronic, ongoing. Elevated.  Start crestor 20 mg daily  Monitor and follow.  Labs in 6 months.     5. Obesity (BMI 30-39.9)  Chronic, ongoing. Encouraged weight loss.    6. Benign prostatic hyperplasia with nocturia  See #2      Followup: Return in about 6 months (around 12/1/2020) for Diabetes.         Educated in proper administration of medication(s) ordered today including safety, possible SE, risks, benefits, rationale and alternatives to therapy.     Please note that this dictation was created using voice recognition software. I have made every reasonable attempt to correct obvious errors, but I expect that there are errors of grammar and possibly content that I did not discover before finalizing the note.

## 2020-07-26 DIAGNOSIS — E11.9 DIABETES MELLITUS WITHOUT COMPLICATION (HCC): ICD-10-CM

## 2020-07-27 RX ORDER — GLIPIZIDE 10 MG/1
TABLET ORAL
Qty: 180 TAB | Refills: 3 | Status: SHIPPED | OUTPATIENT
Start: 2020-07-27 | End: 2021-06-21

## 2020-07-27 NOTE — TELEPHONE ENCOUNTER
Requested Prescriptions     Signed Prescriptions Disp Refills   • glipiZIDE (GLUCOTROL) 10 MG Tab 180 Tab 3     Sig: TAKE 1 TABLET BY MOUTH TWO  TIMES DAILY     Authorizing Provider: CADENCE MCBRIDE A.P.R.N.

## 2020-07-30 ENCOUNTER — HOSPITAL ENCOUNTER (OUTPATIENT)
Dept: RADIOLOGY | Facility: MEDICAL CENTER | Age: 65
End: 2020-07-30
Attending: NURSE PRACTITIONER | Admitting: NURSE PRACTITIONER
Payer: MEDICARE

## 2020-07-30 ENCOUNTER — OFFICE VISIT (OUTPATIENT)
Dept: MEDICAL GROUP | Facility: PHYSICIAN GROUP | Age: 65
End: 2020-07-30
Payer: MEDICARE

## 2020-07-30 VITALS
HEART RATE: 68 BPM | HEIGHT: 72 IN | BODY MASS INDEX: 32.51 KG/M2 | WEIGHT: 240 LBS | TEMPERATURE: 97.8 F | OXYGEN SATURATION: 100 % | RESPIRATION RATE: 16 BRPM | DIASTOLIC BLOOD PRESSURE: 80 MMHG | SYSTOLIC BLOOD PRESSURE: 128 MMHG

## 2020-07-30 DIAGNOSIS — M25.562 ACUTE PAIN OF LEFT KNEE: ICD-10-CM

## 2020-07-30 PROCEDURE — 73560 X-RAY EXAM OF KNEE 1 OR 2: CPT | Mod: LT

## 2020-07-30 PROCEDURE — 99214 OFFICE O/P EST MOD 30 MIN: CPT | Performed by: NURSE PRACTITIONER

## 2020-07-30 ASSESSMENT — FIBROSIS 4 INDEX: FIB4 SCORE: 1.16

## 2020-07-31 ENCOUNTER — TELEPHONE (OUTPATIENT)
Dept: MEDICAL GROUP | Facility: PHYSICIAN GROUP | Age: 65
End: 2020-07-31

## 2020-07-31 NOTE — ASSESSMENT & PLAN NOTE
Reports two weeks ago with left knee pain medial side of knee.  No injury reported.  No bruising or obvious swelling.  Has taken a few advil but no longer using.  Difficulty walking, stiff and sore and pain with twisting. No swelling behind knee.

## 2020-07-31 NOTE — TELEPHONE ENCOUNTER
----- Message from RUCHI Perea sent at 7/31/2020  3:07 PM PDT -----  Asaf  The knee xrays is back.  There is no swelling in the joint space.  There is no joint space narrowing.  There are numerous spurs (bony arthritic changes throughout your knee.  If the ibuprofen, rest, elevation, knee brace do not help over the next 5-7 days we can certainly get you to an orthopedist.  If you would like to try a steroid injection in that knee, make an appointment and I would be happy to do that.  RUCHI Perea

## 2020-07-31 NOTE — PROGRESS NOTES
Chief Complaint   Patient presents with   • Knee Pain     x's 1 month        Subjective:   John Holguin Reyes is a 65 y.o. male here today for evaluation and management of acute left knee pain    Left knee pain  Reports two weeks ago with left knee pain medial side of knee.  No injury reported.  No bruising or obvious swelling.  Has taken a few advil but no longer using.  Difficulty walking, stiff and sore and pain with twisting. No swelling behind knee.          Current medicines (including changes today)  Current Outpatient Medications   Medication Sig Dispense Refill   • glipiZIDE (GLUCOTROL) 10 MG Tab TAKE 1 TABLET BY MOUTH TWO  TIMES DAILY 180 Tab 3   • rosuvastatin (CRESTOR) 20 MG Tab Take 1 Tab by mouth every evening. 90 Tab 3   • Multiple Vitamin (MULTI-VITAMIN DAILY PO) Take  by mouth.     • CINNAMON PO Take  by mouth.     • lisinopril (PRINIVIL) 10 MG Tab Take 1 Tab by mouth every day. 90 Tab 3   • metformin (GLUCOPHAGE) 1000 MG tablet TAKE 1 TABLET BY MOUTH TWO  TIMES DAILY WITH MEALS 180 Tab 3   • insulin glargine (LANTUS SOLOSTAR) 100 UNIT/ML Solution Pen-injector injection Inject 60 units sub q every evening as directed. 20 PEN 6   • tadalafil (CIALIS) 5 MG tablet Take 5 mg by mouth every day.     • B-D UF III MINI PEN NEEDLES 31G X 5 MM Misc USE 1 PENNEEDLE DAILY 100 Each 3   • GLUCOSAMINE HCL PO Take  by mouth.     • Blood Glucose Monitoring Suppl Misc Use  TID  with symptoms of high/low blood sugar. 100 Act 6     No current facility-administered medications for this visit.      He  has a past medical history of BPH (benign prostatic hyperplasia) (3/11/2010), Depression (11/4/2009), Diabetes, DIABETES MELLITUS (11/4/2009), Elevated liver enzymes (3/11/2010), Hyperlipidemia (11/4/2009), Hypertension, and Psychiatric disorder.    ROS as stated in hpi  No chest pain, no shortness of breath, no abdominal pain       Objective:     /80 (BP Location: Left arm, Patient Position: Sitting)   Pulse 68    "Temp 36.6 °C (97.8 °F) (Temporal)   Resp 16   Ht 1.816 m (5' 11.5\")   Wt 108.9 kg (240 lb)   SpO2 100%  Body mass index is 33.01 kg/m².   Physical Exam:  Constitutional: Alert, no distress.  Skin: Warm, dry, good turgor,no cyanosis, no rashes in visible areas.  Eye: Equal, round and reactive, conjunctiva clear, lids normal.  Ears: No tenderness, no discharge.  External canals are without any significant edema or erythema.Gross auditory acuity is intact.  Nose: symmetrical without tenderness, no discharge.  Mouth/Throat: lips without lesion.  Oropharynx clear.    Neck: Trachea midline, no masses, no obvious thyroid enlargement.. . Range of motion within normal limits.  Neuro: Cranial nerves 2-12 grossly intact.  No sensory deficit.  Respiratory: Unlabored respiratory effort  MSK; left knee without obvious swelling, no brusing.  Tenderness to palpation on medial edge of patella.  Eversion and inversion cause pain    Psych: Alert and oriented x3, normal affect and mood and judgement.        Assessment and Plan:   The following treatment plan was discussed    1. Acute pain of left knee  New problem to me.  Acute issue.  Xray ordered.  This may represent osteoarthritic changes, joint effusion, possible medial meniscal tear.  Advised ice, compression, rest and elevation.  Advised advil, avoid tylenol due to elevated Liver function.  Monitor and follow.  May consider joint injection if indicated.   - DX-KNEE 2- LEFT; Future      Followup: Return if symptoms worsen or fail to improve.         Educated in proper administration of medication(s) ordered today including safety, possible SE, risks, benefits, rationale and alternatives to therapy.     Please note that this dictation was created using voice recognition software. I have made every reasonable attempt to correct obvious errors, but I expect that there are errors of grammar and possibly content that I did not discover before finalizing the note.  "

## 2020-08-26 ENCOUNTER — OFFICE VISIT (OUTPATIENT)
Dept: MEDICAL GROUP | Facility: PHYSICIAN GROUP | Age: 65
End: 2020-08-26
Payer: MEDICARE

## 2020-08-26 VITALS
TEMPERATURE: 97.3 F | DIASTOLIC BLOOD PRESSURE: 66 MMHG | WEIGHT: 240 LBS | BODY MASS INDEX: 32.51 KG/M2 | HEIGHT: 72 IN | SYSTOLIC BLOOD PRESSURE: 122 MMHG | RESPIRATION RATE: 16 BRPM | OXYGEN SATURATION: 95 % | HEART RATE: 66 BPM

## 2020-08-26 DIAGNOSIS — M25.562 ACUTE PAIN OF LEFT KNEE: ICD-10-CM

## 2020-08-26 PROCEDURE — 99213 OFFICE O/P EST LOW 20 MIN: CPT | Performed by: NURSE PRACTITIONER

## 2020-08-26 ASSESSMENT — FIBROSIS 4 INDEX: FIB4 SCORE: 1.16

## 2020-08-26 NOTE — PROGRESS NOTES
Chief Complaint   Patient presents with   • Knee Pain     left knee x's 2 months        Subjective:   John Holguin Reyes is a 65 y.o. male here today for evaluation and management of:    Left knee pain  Continues to have pain and instability reported.  No improvement with ice, ibuprofen.  Some improvement, but still not significantly better.  Would like to see orthopedics. Feels that his knee is unstable when walking.            Current medicines (including changes today)  Current Outpatient Medications   Medication Sig Dispense Refill   • glipiZIDE (GLUCOTROL) 10 MG Tab TAKE 1 TABLET BY MOUTH TWO  TIMES DAILY 180 Tab 3   • rosuvastatin (CRESTOR) 20 MG Tab Take 1 Tab by mouth every evening. 90 Tab 3   • Multiple Vitamin (MULTI-VITAMIN DAILY PO) Take  by mouth.     • CINNAMON PO Take  by mouth.     • GLUCOSAMINE HCL PO Take  by mouth.     • lisinopril (PRINIVIL) 10 MG Tab Take 1 Tab by mouth every day. 90 Tab 3   • metformin (GLUCOPHAGE) 1000 MG tablet TAKE 1 TABLET BY MOUTH TWO  TIMES DAILY WITH MEALS 180 Tab 3   • insulin glargine (LANTUS SOLOSTAR) 100 UNIT/ML Solution Pen-injector injection Inject 60 units sub q every evening as directed. 20 PEN 6   • tadalafil (CIALIS) 5 MG tablet Take 5 mg by mouth every day.     • B-D UF III MINI PEN NEEDLES 31G X 5 MM Misc USE 1 PENNEEDLE DAILY 100 Each 3   • Blood Glucose Monitoring Suppl Misc Use  TID  with symptoms of high/low blood sugar. 100 Act 6     No current facility-administered medications for this visit.      He  has a past medical history of BPH (benign prostatic hyperplasia) (3/11/2010), Depression (11/4/2009), Diabetes, DIABETES MELLITUS (11/4/2009), Elevated liver enzymes (3/11/2010), Hyperlipidemia (11/4/2009), Hypertension, and Psychiatric disorder.    ROS as stated in hpi  No chest pain, no shortness of breath, no abdominal pain       Objective:     /66 (BP Location: Left arm, Patient Position: Sitting)   Pulse 66   Temp 36.3 °C (97.3 °F) (Temporal)   " Resp 16   Ht 1.816 m (5' 11.5\")   Wt 108.9 kg (240 lb)   SpO2 95%  Body mass index is 33.01 kg/m².   Physical Exam:  Constitutional: Alert, no distress.  Skin: Warm, dry, good turgor,no cyanosis, no rashes in visible areas.  Eye: Equal, round and reactive, conjunctiva clear, lids normal.  Ears: No tenderness, no discharge.  External canals are without any significant edema or erythema.   Gross auditory acuity is intact.  Nose: symmetrical without tenderness, no discharge.  Mouth/Throat: lips without lesion.  Oropharynx clear.    Neck: Trachea midline, no masses, no obvious thyroid enlargement... Range of motion within normal limits.  Neuro: Cranial nerves 2-12 grossly intact.  No sensory deficit.  Respiratory: Unlabored respiratory effort  Cardiovascular: Normal S1, S2, no murmur, no edema.  Psych: Alert and oriented x3, normal affect and mood and judgement.        Assessment and Plan:   The following treatment plan was discussed    1. Acute pain of left knee  Acute, ongoing.not improving.  Will refer to ortho.  Possible meniscus or ligament injury. Continue to ice, elevated ibuprofen.  Monitor and follow.   - REFERRAL TO ORTHOPEDICS      Followup: Return if symptoms worsen or fail to improve.         Educated in proper administration of medication(s) ordered today including safety, possible SE, risks, benefits, rationale and alternatives to therapy.     Please note that this dictation was created using voice recognition software. I have made every reasonable attempt to correct obvious errors, but I expect that there are errors of grammar and possibly content that I did not discover before finalizing the note.  "

## 2020-08-26 NOTE — ASSESSMENT & PLAN NOTE
Continues to have pain and instability reported.  No improvement with ice, ibuprofen.  Some improvement, but still not significantly better.  Would like to see orthopedics. Feels that his knee is unstable when walking.

## 2020-10-05 RX ORDER — LISINOPRIL 10 MG/1
TABLET ORAL
Qty: 90 TAB | Refills: 4 | Status: SHIPPED | OUTPATIENT
Start: 2020-10-05 | End: 2021-11-17

## 2020-10-05 NOTE — TELEPHONE ENCOUNTER
Received request via: Pharmacy    Was the patient seen in the last year in this department? Yes    Does the patient have an active prescription (recently filled or refills available) for medication(s) requested? DUE 12/2/20 MAIL ORDER REQUESTING

## 2020-10-05 NOTE — TELEPHONE ENCOUNTER
Requested Prescriptions     Signed Prescriptions Disp Refills   • lisinopril (PRINIVIL) 10 MG Tab 90 Tab 4     Sig: TAKE 1 TABLET BY MOUTH  EVERY DAY     Authorizing Provider: CADENCE MCBRIDE A.P.R.N.

## 2020-10-27 NOTE — TELEPHONE ENCOUNTER
Requested Prescriptions     Signed Prescriptions Disp Refills   • metformin (GLUCOPHAGE) 1000 MG tablet 180 Tab 3     Sig: TAKE 1 TABLET BY MOUTH TWO  TIMES DAILY WITH MEALS     Authorizing Provider: CADENCE MCBRIDE A.P.R.N.

## 2020-12-03 ENCOUNTER — HOSPITAL ENCOUNTER (OUTPATIENT)
Dept: LAB | Facility: MEDICAL CENTER | Age: 65
End: 2020-12-03
Attending: NURSE PRACTITIONER
Payer: MEDICARE

## 2020-12-03 DIAGNOSIS — E11.29 TYPE 2 DIABETES MELLITUS WITH MICROALBUMINURIA, WITHOUT LONG-TERM CURRENT USE OF INSULIN (HCC): ICD-10-CM

## 2020-12-03 DIAGNOSIS — R80.9 TYPE 2 DIABETES MELLITUS WITH MICROALBUMINURIA, WITHOUT LONG-TERM CURRENT USE OF INSULIN (HCC): ICD-10-CM

## 2020-12-03 LAB
25(OH)D3 SERPL-MCNC: 24 NG/ML (ref 30–100)
ALBUMIN SERPL BCP-MCNC: 4.4 G/DL (ref 3.2–4.9)
ALBUMIN/GLOB SERPL: 1.6 G/DL
ALP SERPL-CCNC: 77 U/L (ref 30–99)
ALT SERPL-CCNC: 37 U/L (ref 2–50)
ANION GAP SERPL CALC-SCNC: 7 MMOL/L (ref 7–16)
AST SERPL-CCNC: 25 U/L (ref 12–45)
BASOPHILS # BLD AUTO: 1 % (ref 0–1.8)
BASOPHILS # BLD: 0.09 K/UL (ref 0–0.12)
BILIRUB SERPL-MCNC: 0.4 MG/DL (ref 0.1–1.5)
BUN SERPL-MCNC: 20 MG/DL (ref 8–22)
CALCIUM SERPL-MCNC: 10 MG/DL (ref 8.5–10.5)
CHLORIDE SERPL-SCNC: 107 MMOL/L (ref 96–112)
CHOLEST SERPL-MCNC: 103 MG/DL (ref 100–199)
CO2 SERPL-SCNC: 25 MMOL/L (ref 20–33)
CREAT SERPL-MCNC: 0.96 MG/DL (ref 0.5–1.4)
CREAT UR-MCNC: 173.49 MG/DL
EOSINOPHIL # BLD AUTO: 0.41 K/UL (ref 0–0.51)
EOSINOPHIL NFR BLD: 4.8 % (ref 0–6.9)
ERYTHROCYTE [DISTWIDTH] IN BLOOD BY AUTOMATED COUNT: 47.1 FL (ref 35.9–50)
EST. AVERAGE GLUCOSE BLD GHB EST-MCNC: 163 MG/DL
FASTING STATUS PATIENT QL REPORTED: NORMAL
GLOBULIN SER CALC-MCNC: 2.7 G/DL (ref 1.9–3.5)
GLUCOSE SERPL-MCNC: 129 MG/DL (ref 65–99)
HBA1C MFR BLD: 7.3 % (ref 0–5.6)
HCT VFR BLD AUTO: 47.5 % (ref 42–52)
HDLC SERPL-MCNC: 31 MG/DL
HGB BLD-MCNC: 15.5 G/DL (ref 14–18)
IMM GRANULOCYTES # BLD AUTO: 0.03 K/UL (ref 0–0.11)
IMM GRANULOCYTES NFR BLD AUTO: 0.3 % (ref 0–0.9)
LDLC SERPL CALC-MCNC: 38 MG/DL
LYMPHOCYTES # BLD AUTO: 1.91 K/UL (ref 1–4.8)
LYMPHOCYTES NFR BLD: 22.2 % (ref 22–41)
MCH RBC QN AUTO: 29.8 PG (ref 27–33)
MCHC RBC AUTO-ENTMCNC: 32.6 G/DL (ref 33.7–35.3)
MCV RBC AUTO: 91.2 FL (ref 81.4–97.8)
MICROALBUMIN UR-MCNC: 2.1 MG/DL
MICROALBUMIN/CREAT UR: 12 MG/G (ref 0–30)
MONOCYTES # BLD AUTO: 0.64 K/UL (ref 0–0.85)
MONOCYTES NFR BLD AUTO: 7.4 % (ref 0–13.4)
NEUTROPHILS # BLD AUTO: 5.53 K/UL (ref 1.82–7.42)
NEUTROPHILS NFR BLD: 64.3 % (ref 44–72)
NRBC # BLD AUTO: 0 K/UL
NRBC BLD-RTO: 0 /100 WBC
PLATELET # BLD AUTO: 284 K/UL (ref 164–446)
PMV BLD AUTO: 10.7 FL (ref 9–12.9)
POTASSIUM SERPL-SCNC: 4.3 MMOL/L (ref 3.6–5.5)
PROT SERPL-MCNC: 7.1 G/DL (ref 6–8.2)
RBC # BLD AUTO: 5.21 M/UL (ref 4.7–6.1)
SODIUM SERPL-SCNC: 139 MMOL/L (ref 135–145)
TRIGL SERPL-MCNC: 170 MG/DL (ref 0–149)
TSH SERPL DL<=0.005 MIU/L-ACNC: 2.01 UIU/ML (ref 0.38–5.33)
WBC # BLD AUTO: 8.6 K/UL (ref 4.8–10.8)

## 2020-12-03 PROCEDURE — 80061 LIPID PANEL: CPT

## 2020-12-03 PROCEDURE — 82570 ASSAY OF URINE CREATININE: CPT

## 2020-12-03 PROCEDURE — 36415 COLL VENOUS BLD VENIPUNCTURE: CPT | Mod: GA

## 2020-12-03 PROCEDURE — 84443 ASSAY THYROID STIM HORMONE: CPT

## 2020-12-03 PROCEDURE — 85025 COMPLETE CBC W/AUTO DIFF WBC: CPT

## 2020-12-03 PROCEDURE — 82043 UR ALBUMIN QUANTITATIVE: CPT

## 2020-12-03 PROCEDURE — 80053 COMPREHEN METABOLIC PANEL: CPT

## 2020-12-03 PROCEDURE — 82306 VITAMIN D 25 HYDROXY: CPT | Mod: GA

## 2020-12-03 PROCEDURE — 83036 HEMOGLOBIN GLYCOSYLATED A1C: CPT | Mod: GA

## 2020-12-07 ENCOUNTER — OFFICE VISIT (OUTPATIENT)
Dept: MEDICAL GROUP | Facility: PHYSICIAN GROUP | Age: 65
End: 2020-12-07
Payer: MEDICARE

## 2020-12-07 VITALS
TEMPERATURE: 97.1 F | DIASTOLIC BLOOD PRESSURE: 82 MMHG | RESPIRATION RATE: 16 BRPM | HEART RATE: 64 BPM | SYSTOLIC BLOOD PRESSURE: 142 MMHG | BODY MASS INDEX: 33.86 KG/M2 | HEIGHT: 72 IN | WEIGHT: 250 LBS | OXYGEN SATURATION: 96 %

## 2020-12-07 DIAGNOSIS — M25.562 ACUTE PAIN OF LEFT KNEE: ICD-10-CM

## 2020-12-07 DIAGNOSIS — E66.9 OBESITY (BMI 30-39.9): ICD-10-CM

## 2020-12-07 DIAGNOSIS — R97.20 ELEVATED PSA: ICD-10-CM

## 2020-12-07 DIAGNOSIS — R80.9 TYPE 2 DIABETES MELLITUS WITH MICROALBUMINURIA, WITHOUT LONG-TERM CURRENT USE OF INSULIN (HCC): ICD-10-CM

## 2020-12-07 DIAGNOSIS — E11.29 TYPE 2 DIABETES MELLITUS WITH MICROALBUMINURIA, WITHOUT LONG-TERM CURRENT USE OF INSULIN (HCC): ICD-10-CM

## 2020-12-07 PROCEDURE — 99214 OFFICE O/P EST MOD 30 MIN: CPT | Performed by: NURSE PRACTITIONER

## 2020-12-07 RX ORDER — INSULIN GLARGINE 100 [IU]/ML
INJECTION, SOLUTION SUBCUTANEOUS
Qty: 2000 ML | Refills: 3 | Status: SHIPPED | OUTPATIENT
Start: 2020-12-07 | End: 2021-12-07

## 2020-12-07 RX ORDER — A/SINGAPORE/GP1908/2015 IVR-180 (AN A/MICHIGAN/45/2015 (H1N1)PDM09-LIKE VIRUS, A/HONG KONG/4801/2014, NYMC X-263B (H3N2) (AN A/HONG KONG/4801/2014-LIKE VIRUS), AND B/BRISBANE/60/2008, WILD TYPE (A B/BRISBANE/60/2008-LIKE VIRUS) 15; 15; 15 UG/.5ML; UG/.5ML; UG/.5ML
INJECTION, SUSPENSION INTRAMUSCULAR
Status: ON HOLD | COMMUNITY
Start: 2020-10-21 | End: 2021-06-01

## 2020-12-07 ASSESSMENT — FIBROSIS 4 INDEX: FIB4 SCORE: 0.94

## 2020-12-07 NOTE — ASSESSMENT & PLAN NOTE
A1c 7.3 today.  Reports with knee pain his walking is less.  Reports weight is up 10 pounds.  More breads and carbs.  Taking metformin, glipizide and insulin at night.  Discussion around weight and food.  He will work to decrease carbs in his diet and increase protein.

## 2020-12-07 NOTE — PROGRESS NOTES
Chief Complaint   Patient presents with   • Follow-Up   • Diabetes   • Hypertension   • Lab Results       Subjective:   John Holguin Reyes is a 65 y.o. male here today for evaluation and management of:    Left knee pain  MRI shows meniscal tear and arthritis.  Followed by Dr. Nieves.  Wearing brace.      DM type 2 (diabetes mellitus, type 2)  A1c 7.3 today.  Reports with knee pain his walking is less.  Reports weight is up 10 pounds.  More breads and carbs.  Taking metformin, glipizide and insulin at night.  Discussion around weight and food.  He will work to decrease carbs in his diet and increase protein.     Elevated PSA  Seeing Urology for this issue.  Next appointment later this month.     Obesity (BMI 30-39.9)  bmi 34.38.  Up 10 pounds since last visit.          Current medicines (including changes today)  Current Outpatient Medications   Medication Sig Dispense Refill   • Coenzyme Q10 (CO Q 10 PO) Take  by mouth.     • insulin glargine (LANTUS SOLOSTAR) 100 UNIT/ML Solution Pen-injector injection Inject 60 units sub q every evening as directed. 2000 mL 3   • metformin (GLUCOPHAGE) 1000 MG tablet TAKE 1 TABLET BY MOUTH TWO  TIMES DAILY WITH MEALS 180 Tab 3   • lisinopril (PRINIVIL) 10 MG Tab TAKE 1 TABLET BY MOUTH  EVERY DAY 90 Tab 4   • glipiZIDE (GLUCOTROL) 10 MG Tab TAKE 1 TABLET BY MOUTH TWO  TIMES DAILY 180 Tab 3   • rosuvastatin (CRESTOR) 20 MG Tab Take 1 Tab by mouth every evening. 90 Tab 3   • B-D UF III MINI PEN NEEDLES 31G X 5 MM Misc USE 1 PENNEEDLE DAILY 100 Each 3   • Multiple Vitamin (MULTI-VITAMIN DAILY PO) Take  by mouth.     • CINNAMON PO Take  by mouth.     • GLUCOSAMINE HCL PO Take  by mouth.     • Blood Glucose Monitoring Suppl Misc Use  TID  with symptoms of high/low blood sugar. 100 Act 6   • tadalafil (CIALIS) 5 MG tablet Take 5 mg by mouth every day.       No current facility-administered medications for this visit.      He  has a past medical history of BPH (benign prostatic  "hyperplasia) (3/11/2010), Depression (11/4/2009), Diabetes, DIABETES MELLITUS (11/4/2009), Elevated liver enzymes (3/11/2010), Hyperlipidemia (11/4/2009), Hypertension, and Psychiatric disorder.    ROS as stated in hpi  No chest pain, no shortness of breath, no abdominal pain       Objective:     /82   Pulse 64   Temp 36.2 °C (97.1 °F) (Temporal)   Resp 16   Ht 1.816 m (5' 11.5\")   Wt 113.4 kg (250 lb)   SpO2 96%  Body mass index is 34.38 kg/m². up 10 pounds.   Physical Exam:  Constitutional: Alert, no distress.  Skin: Warm, dry, good turgor,no cyanosis, no rashes in visible areas.  Eye: Equal, round and reactive, conjunctiva clear, lids normal.    Throat without erythema, exudates or tonsillar enlargement.  Neck: Trachea midline, no masses, no obvious thyroid enlargement.. No cervical or supraclavicular lymphadenopathy. Range of motion within normal limits.  Neuro: Cranial nerves 2-12 grossly intact.  No sensory deficit.  Respiratory: Unlabored respiratory effort,auscultation,   Cardiovascular: Normal S1, S2, no murmur, no edema.  Psych: Alert and oriented x3, normal affect and mood and judgement.        Assessment and Plan:   The following treatment plan was discussed    1. Acute pain of left knee  Chronic issue, now seeing Dr. Nieves.  Some improvement with knee injection.  Discussed weight loss to help.  Continue  Knee brace, elevate after work.  Monitor.     2. Type 2 diabetes mellitus with microalbuminuria, without long-term current use of insulin (HCC)  Chronic, ongoing. A1c has increased.  Weight up 10 pounds.  Continue current meds.  Work on weight loss 2 pounds/month.  Monitor and follow.  Return in 6 months.     3. Elevated PSA  Chronic, ongoing. Followed by urology.     4. Obesity (BMI 30-39.9)  Chronic, ongoing. Worsening.  Discussed at length diet and weight loss.  Knee pain is making it difficult to walk.  Monitor, return in 6 months.       Followup: Return in about 6 months (around " 6/7/2021) for a1c.         Educated in proper administration of medication(s) ordered today including safety, possible SE, risks, benefits, rationale and alternatives to therapy.     Please note that this dictation was created using voice recognition software. I have made every reasonable attempt to correct obvious errors, but I expect that there are errors of grammar and possibly content that I did not discover before finalizing the note.

## 2021-01-18 ENCOUNTER — HOSPITAL ENCOUNTER (OUTPATIENT)
Facility: MEDICAL CENTER | Age: 66
End: 2021-01-18
Attending: PHYSICIAN ASSISTANT
Payer: MEDICARE

## 2021-01-18 PROCEDURE — 84153 ASSAY OF PSA TOTAL: CPT

## 2021-01-19 LAB — PSA SERPL-MCNC: 8.75 NG/ML (ref 0–4)

## 2021-01-27 DIAGNOSIS — R80.9 TYPE 2 DIABETES MELLITUS WITH MICROALBUMINURIA, WITHOUT LONG-TERM CURRENT USE OF INSULIN (HCC): ICD-10-CM

## 2021-01-27 DIAGNOSIS — E11.29 TYPE 2 DIABETES MELLITUS WITH MICROALBUMINURIA, WITHOUT LONG-TERM CURRENT USE OF INSULIN (HCC): ICD-10-CM

## 2021-01-28 RX ORDER — FLURBIPROFEN SODIUM 0.3 MG/ML
SOLUTION/ DROPS OPHTHALMIC
Qty: 90 EACH | Refills: 1 | Status: SHIPPED | OUTPATIENT
Start: 2021-01-28 | End: 2021-08-02

## 2021-01-28 NOTE — TELEPHONE ENCOUNTER
Received request via: Pharmacy    Was the patient seen in the last year in this department? Yes LOV 12/07/2020    Does the patient have an active prescription (recently filled or refills available) for medication(s) requested? No

## 2021-01-28 NOTE — TELEPHONE ENCOUNTER
Requested Prescriptions     Signed Prescriptions Disp Refills   • B-D UF III MINI PEN NEEDLES 31G X 5 MM Misc 90 Each 1     Sig: USE 1 PENNEEDLE DAILY     Authorizing Provider: JAMES VELEZ A.P.R.N.

## 2021-03-03 DIAGNOSIS — Z23 NEED FOR VACCINATION: ICD-10-CM

## 2021-03-30 RX ORDER — ROSUVASTATIN CALCIUM 20 MG/1
TABLET, COATED ORAL
Qty: 90 TABLET | Refills: 3 | Status: SHIPPED | OUTPATIENT
Start: 2021-03-30 | End: 2022-02-15 | Stop reason: SDUPTHER

## 2021-03-30 NOTE — TELEPHONE ENCOUNTER
Received request via: Pharmacy    Was the patient seen in the last year in this department? Yes    Does the patient have an active prescription (recently filled or refills available) for medication(s) requested? DUE 6/1/2021

## 2021-03-30 NOTE — TELEPHONE ENCOUNTER
Requested Prescriptions     Signed Prescriptions Disp Refills   • rosuvastatin (CRESTOR) 20 MG Tab 90 tablet 3     Sig: TAKE 1 TABLET BY MOUTH IN  THE EVENING     Authorizing Provider: CADENCE MCBRIDE A.P.R.N.

## 2021-05-13 ENCOUNTER — HOSPITAL ENCOUNTER (OUTPATIENT)
Dept: RADIOLOGY | Facility: MEDICAL CENTER | Age: 66
End: 2021-05-13
Attending: UROLOGY | Admitting: UROLOGY
Payer: MEDICARE

## 2021-05-13 ENCOUNTER — PRE-ADMISSION TESTING (OUTPATIENT)
Dept: ADMISSIONS | Facility: MEDICAL CENTER | Age: 66
End: 2021-05-13
Attending: UROLOGY
Payer: MEDICARE

## 2021-05-13 DIAGNOSIS — Z01.811 PRE-OPERATIVE RESPIRATORY EXAMINATION: ICD-10-CM

## 2021-05-13 DIAGNOSIS — Z01.812 PRE-OPERATIVE LABORATORY EXAMINATION: ICD-10-CM

## 2021-05-13 DIAGNOSIS — Z01.810 PRE-OPERATIVE CARDIOVASCULAR EXAMINATION: ICD-10-CM

## 2021-05-13 LAB
ABO GROUP BLD: NORMAL
ALBUMIN SERPL BCP-MCNC: 4.5 G/DL (ref 3.2–4.9)
ALBUMIN/GLOB SERPL: 1.6 G/DL
ALP SERPL-CCNC: 65 U/L (ref 30–99)
ALT SERPL-CCNC: 53 U/L (ref 2–50)
ANION GAP SERPL CALC-SCNC: 10 MMOL/L (ref 7–16)
APPEARANCE UR: CLEAR
AST SERPL-CCNC: 31 U/L (ref 12–45)
BASOPHILS # BLD AUTO: 0.8 % (ref 0–1.8)
BASOPHILS # BLD: 0.08 K/UL (ref 0–0.12)
BILIRUB SERPL-MCNC: 0.7 MG/DL (ref 0.1–1.5)
BILIRUB UR QL STRIP.AUTO: NEGATIVE
BLD GP AB SCN SERPL QL: NORMAL
BUN SERPL-MCNC: 15 MG/DL (ref 8–22)
CALCIUM SERPL-MCNC: 10.2 MG/DL (ref 8.5–10.5)
CHLORIDE SERPL-SCNC: 107 MMOL/L (ref 96–112)
CO2 SERPL-SCNC: 26 MMOL/L (ref 20–33)
COLOR UR: YELLOW
CREAT SERPL-MCNC: 0.99 MG/DL (ref 0.5–1.4)
EKG IMPRESSION: NORMAL
EOSINOPHIL # BLD AUTO: 0.31 K/UL (ref 0–0.51)
EOSINOPHIL NFR BLD: 2.9 % (ref 0–6.9)
ERYTHROCYTE [DISTWIDTH] IN BLOOD BY AUTOMATED COUNT: 45.5 FL (ref 35.9–50)
GLOBULIN SER CALC-MCNC: 2.8 G/DL (ref 1.9–3.5)
GLUCOSE SERPL-MCNC: 110 MG/DL (ref 65–99)
GLUCOSE UR STRIP.AUTO-MCNC: NEGATIVE MG/DL
HCT VFR BLD AUTO: 45.8 % (ref 42–52)
HGB BLD-MCNC: 15.7 G/DL (ref 14–18)
IMM GRANULOCYTES # BLD AUTO: 0.04 K/UL (ref 0–0.11)
IMM GRANULOCYTES NFR BLD AUTO: 0.4 % (ref 0–0.9)
INR PPP: 1.03 (ref 0.87–1.13)
KETONES UR STRIP.AUTO-MCNC: NEGATIVE MG/DL
LEUKOCYTE ESTERASE UR QL STRIP.AUTO: NEGATIVE
LYMPHOCYTES # BLD AUTO: 2.35 K/UL (ref 1–4.8)
LYMPHOCYTES NFR BLD: 22.1 % (ref 22–41)
MCH RBC QN AUTO: 29.9 PG (ref 27–33)
MCHC RBC AUTO-ENTMCNC: 34.3 G/DL (ref 33.7–35.3)
MCV RBC AUTO: 87.2 FL (ref 81.4–97.8)
MICRO URNS: NORMAL
MONOCYTES # BLD AUTO: 0.76 K/UL (ref 0–0.85)
MONOCYTES NFR BLD AUTO: 7.1 % (ref 0–13.4)
NEUTROPHILS # BLD AUTO: 7.1 K/UL (ref 1.82–7.42)
NEUTROPHILS NFR BLD: 66.7 % (ref 44–72)
NITRITE UR QL STRIP.AUTO: NEGATIVE
NRBC # BLD AUTO: 0 K/UL
NRBC BLD-RTO: 0 /100 WBC
PH UR STRIP.AUTO: 6 [PH] (ref 5–8)
PLATELET # BLD AUTO: 282 K/UL (ref 164–446)
PMV BLD AUTO: 10 FL (ref 9–12.9)
POTASSIUM SERPL-SCNC: 4.5 MMOL/L (ref 3.6–5.5)
PROT SERPL-MCNC: 7.3 G/DL (ref 6–8.2)
PROT UR QL STRIP: NEGATIVE MG/DL
PROTHROMBIN TIME: 13.8 SEC (ref 12–14.6)
RBC # BLD AUTO: 5.25 M/UL (ref 4.7–6.1)
RBC UR QL AUTO: NEGATIVE
RH BLD: NORMAL
SODIUM SERPL-SCNC: 143 MMOL/L (ref 135–145)
SP GR UR STRIP.AUTO: 1.02
UROBILINOGEN UR STRIP.AUTO-MCNC: 0.2 MG/DL
WBC # BLD AUTO: 10.6 K/UL (ref 4.8–10.8)

## 2021-05-13 PROCEDURE — 71045 X-RAY EXAM CHEST 1 VIEW: CPT

## 2021-05-13 PROCEDURE — 36415 COLL VENOUS BLD VENIPUNCTURE: CPT

## 2021-05-13 PROCEDURE — 93005 ELECTROCARDIOGRAM TRACING: CPT

## 2021-05-13 PROCEDURE — 85610 PROTHROMBIN TIME: CPT

## 2021-05-13 PROCEDURE — 93010 ELECTROCARDIOGRAM REPORT: CPT | Performed by: INTERNAL MEDICINE

## 2021-05-13 PROCEDURE — 81003 URINALYSIS AUTO W/O SCOPE: CPT

## 2021-05-13 PROCEDURE — 86850 RBC ANTIBODY SCREEN: CPT

## 2021-05-13 PROCEDURE — 86901 BLOOD TYPING SEROLOGIC RH(D): CPT

## 2021-05-13 PROCEDURE — 86900 BLOOD TYPING SEROLOGIC ABO: CPT

## 2021-05-13 PROCEDURE — 85025 COMPLETE CBC W/AUTO DIFF WBC: CPT

## 2021-05-13 PROCEDURE — 80053 COMPREHEN METABOLIC PANEL: CPT

## 2021-05-13 PROCEDURE — 87086 URINE CULTURE/COLONY COUNT: CPT

## 2021-05-13 ASSESSMENT — FIBROSIS 4 INDEX: FIB4 SCORE: 0.96

## 2021-05-15 LAB
BACTERIA UR CULT: NORMAL
SIGNIFICANT IND 70042: NORMAL
SITE SITE: NORMAL
SOURCE SOURCE: NORMAL

## 2021-05-24 ENCOUNTER — TELEPHONE (OUTPATIENT)
Dept: MEDICAL GROUP | Facility: PHYSICIAN GROUP | Age: 66
End: 2021-05-24

## 2021-05-24 NOTE — TELEPHONE ENCOUNTER
Phone Number Called: [phone] (home)       Call outcome: Spoke to patient regarding message below.    Message: The surgeon will instruct on which meds to hold and which to take on day of surgery.  Anesthesia would want you to take all of your regular meds the day before.  Hold supplements/vitamins 10-14 days prior to surgery.  Hold glipizide and metformin day of surgery.   ZARIA Perea.

## 2021-05-24 NOTE — TELEPHONE ENCOUNTER
VOICEMAIL  1. Caller Name: Asaf Clarke Back Number: [phone] (home)      2. Message: Patient is scheduled for a laparoscopic prostate surgery on 6/1 and they want him to stop his medication 1-2 days prior. He is not comfortable with stopping to many days and is wondering what you think? He is seeing the surgeon tomorrow morning and will ask them as well but want your opinion. Please advise     3. Patient approves office to leave a detailed voicemail/Deolanhart message: N\A

## 2021-05-24 NOTE — TELEPHONE ENCOUNTER
The surgeon will instruct on which meds to hold and which to take on day of surgery.  Anesthesia would want you to take all of your regular meds the day before.  Hold supplements/vitamins 10-14 days prior to surgery.  Hold glipizide and metformin day of surgery.   ZARIA Perea.

## 2021-05-29 ENCOUNTER — PRE-ADMISSION TESTING (OUTPATIENT)
Dept: ADMISSIONS | Facility: MEDICAL CENTER | Age: 66
End: 2021-05-29
Attending: UROLOGY
Payer: MEDICARE

## 2021-05-29 DIAGNOSIS — Z01.812 PRE-OPERATIVE LABORATORY EXAMINATION: ICD-10-CM

## 2021-05-29 LAB — COVID ORDER STATUS COVID19: NORMAL

## 2021-05-29 PROCEDURE — C9803 HOPD COVID-19 SPEC COLLECT: HCPCS

## 2021-05-29 PROCEDURE — U0003 INFECTIOUS AGENT DETECTION BY NUCLEIC ACID (DNA OR RNA); SEVERE ACUTE RESPIRATORY SYNDROME CORONAVIRUS 2 (SARS-COV-2) (CORONAVIRUS DISEASE [COVID-19]), AMPLIFIED PROBE TECHNIQUE, MAKING USE OF HIGH THROUGHPUT TECHNOLOGIES AS DESCRIBED BY CMS-2020-01-R: HCPCS

## 2021-05-29 PROCEDURE — U0005 INFEC AGEN DETEC AMPLI PROBE: HCPCS

## 2021-05-30 LAB
SARS-COV-2 RNA RESP QL NAA+PROBE: NOTDETECTED
SPECIMEN SOURCE: NORMAL

## 2021-06-01 ENCOUNTER — ANESTHESIA (OUTPATIENT)
Dept: SURGERY | Facility: MEDICAL CENTER | Age: 66
End: 2021-06-01
Payer: MEDICARE

## 2021-06-01 ENCOUNTER — HOSPITAL ENCOUNTER (OUTPATIENT)
Facility: MEDICAL CENTER | Age: 66
End: 2021-06-02
Attending: UROLOGY | Admitting: UROLOGY
Payer: MEDICARE

## 2021-06-01 ENCOUNTER — ANESTHESIA EVENT (OUTPATIENT)
Dept: SURGERY | Facility: MEDICAL CENTER | Age: 66
End: 2021-06-01
Payer: MEDICARE

## 2021-06-01 DIAGNOSIS — G89.18 POSTOPERATIVE PAIN: ICD-10-CM

## 2021-06-01 LAB
ABO GROUP BLD: NORMAL
ANION GAP SERPL CALC-SCNC: 10 MMOL/L (ref 7–16)
BLD GP AB SCN SERPL QL: NORMAL
BUN SERPL-MCNC: 16 MG/DL (ref 8–22)
CALCIUM SERPL-MCNC: 8.6 MG/DL (ref 8.5–10.5)
CHLORIDE SERPL-SCNC: 109 MMOL/L (ref 96–112)
CO2 SERPL-SCNC: 25 MMOL/L (ref 20–33)
CREAT SERPL-MCNC: 1.34 MG/DL (ref 0.5–1.4)
ERYTHROCYTE [DISTWIDTH] IN BLOOD BY AUTOMATED COUNT: 45.9 FL (ref 35.9–50)
GLUCOSE BLD-MCNC: 105 MG/DL (ref 65–99)
GLUCOSE BLD-MCNC: 128 MG/DL (ref 65–99)
GLUCOSE BLD-MCNC: 143 MG/DL (ref 65–99)
GLUCOSE BLD-MCNC: 206 MG/DL (ref 65–99)
GLUCOSE SERPL-MCNC: 153 MG/DL (ref 65–99)
HCT VFR BLD AUTO: 43.3 % (ref 42–52)
HGB BLD-MCNC: 14.7 G/DL (ref 14–18)
MCH RBC QN AUTO: 30.3 PG (ref 27–33)
MCHC RBC AUTO-ENTMCNC: 33.9 G/DL (ref 33.7–35.3)
MCV RBC AUTO: 89.3 FL (ref 81.4–97.8)
PATHOLOGY CONSULT NOTE: NORMAL
PLATELET # BLD AUTO: 255 K/UL (ref 164–446)
PMV BLD AUTO: 9.6 FL (ref 9–12.9)
POTASSIUM SERPL-SCNC: 4.5 MMOL/L (ref 3.6–5.5)
RBC # BLD AUTO: 4.85 M/UL (ref 4.7–6.1)
RH BLD: NORMAL
SODIUM SERPL-SCNC: 144 MMOL/L (ref 135–145)
WBC # BLD AUTO: 14.1 K/UL (ref 4.8–10.8)

## 2021-06-01 PROCEDURE — 501838 HCHG SUTURE GENERAL: Performed by: UROLOGY

## 2021-06-01 PROCEDURE — 700102 HCHG RX REV CODE 250 W/ 637 OVERRIDE(OP): Performed by: UROLOGY

## 2021-06-01 PROCEDURE — A9270 NON-COVERED ITEM OR SERVICE: HCPCS | Performed by: UROLOGY

## 2021-06-01 PROCEDURE — 500868 HCHG NEEDLE, SURGI(VARES): Performed by: UROLOGY

## 2021-06-01 PROCEDURE — 86900 BLOOD TYPING SEROLOGIC ABO: CPT

## 2021-06-01 PROCEDURE — 96374 THER/PROPH/DIAG INJ IV PUSH: CPT

## 2021-06-01 PROCEDURE — 86901 BLOOD TYPING SEROLOGIC RH(D): CPT

## 2021-06-01 PROCEDURE — 160042 HCHG SURGERY MINUTES - EA ADDL 1 MIN LEVEL 5: Performed by: UROLOGY

## 2021-06-01 PROCEDURE — 700111 HCHG RX REV CODE 636 W/ 250 OVERRIDE (IP): Performed by: ANESTHESIOLOGY

## 2021-06-01 PROCEDURE — A4338 INDWELLING CATHETER LATEX: HCPCS | Performed by: UROLOGY

## 2021-06-01 PROCEDURE — 501329 HCHG SET, CYSTO IRRIG Y TUR: Performed by: UROLOGY

## 2021-06-01 PROCEDURE — 96376 TX/PRO/DX INJ SAME DRUG ADON: CPT

## 2021-06-01 PROCEDURE — G0378 HOSPITAL OBSERVATION PER HR: HCPCS

## 2021-06-01 PROCEDURE — 501399 HCHG SPECIMAN BAG, ENDO CATC: Performed by: UROLOGY

## 2021-06-01 PROCEDURE — A4357 BEDSIDE DRAINAGE BAG: HCPCS | Performed by: UROLOGY

## 2021-06-01 PROCEDURE — 160002 HCHG RECOVERY MINUTES (STAT): Performed by: UROLOGY

## 2021-06-01 PROCEDURE — A4346 CATH INDW FOLEY 3 WAY: HCPCS | Performed by: UROLOGY

## 2021-06-01 PROCEDURE — 502714 HCHG ROBOTIC SURGERY SERVICES: Performed by: UROLOGY

## 2021-06-01 PROCEDURE — A9270 NON-COVERED ITEM OR SERVICE: HCPCS | Performed by: ANESTHESIOLOGY

## 2021-06-01 PROCEDURE — 88307 TISSUE EXAM BY PATHOLOGIST: CPT

## 2021-06-01 PROCEDURE — 82962 GLUCOSE BLOOD TEST: CPT

## 2021-06-01 PROCEDURE — 501570 HCHG TROCAR, SEPARATOR: Performed by: UROLOGY

## 2021-06-01 PROCEDURE — 80048 BASIC METABOLIC PNL TOTAL CA: CPT

## 2021-06-01 PROCEDURE — 501571 HCHG TROCAR, SEPARATOR 12X100: Performed by: UROLOGY

## 2021-06-01 PROCEDURE — 501338 HCHG SHEARS, ENDO: Performed by: UROLOGY

## 2021-06-01 PROCEDURE — 160031 HCHG SURGERY MINUTES - 1ST 30 MINS LEVEL 5: Performed by: UROLOGY

## 2021-06-01 PROCEDURE — 86850 RBC ANTIBODY SCREEN: CPT

## 2021-06-01 PROCEDURE — 160009 HCHG ANES TIME/MIN: Performed by: UROLOGY

## 2021-06-01 PROCEDURE — 160048 HCHG OR STATISTICAL LEVEL 1-5: Performed by: UROLOGY

## 2021-06-01 PROCEDURE — 700101 HCHG RX REV CODE 250: Performed by: UROLOGY

## 2021-06-01 PROCEDURE — 700101 HCHG RX REV CODE 250: Performed by: ANESTHESIOLOGY

## 2021-06-01 PROCEDURE — 700111 HCHG RX REV CODE 636 W/ 250 OVERRIDE (IP): Performed by: UROLOGY

## 2021-06-01 PROCEDURE — 700105 HCHG RX REV CODE 258: Performed by: UROLOGY

## 2021-06-01 PROCEDURE — 700102 HCHG RX REV CODE 250 W/ 637 OVERRIDE(OP): Performed by: ANESTHESIOLOGY

## 2021-06-01 PROCEDURE — 85027 COMPLETE CBC AUTOMATED: CPT

## 2021-06-01 PROCEDURE — 160035 HCHG PACU - 1ST 60 MINS PHASE I: Performed by: UROLOGY

## 2021-06-01 PROCEDURE — 96372 THER/PROPH/DIAG INJ SC/IM: CPT

## 2021-06-01 RX ORDER — DIPHENHYDRAMINE HYDROCHLORIDE 50 MG/ML
25 INJECTION INTRAMUSCULAR; INTRAVENOUS EVERY 6 HOURS PRN
Status: DISCONTINUED | OUTPATIENT
Start: 2021-06-01 | End: 2021-06-02 | Stop reason: HOSPADM

## 2021-06-01 RX ORDER — POLYETHYLENE GLYCOL 3350 17 G/17G
1 POWDER, FOR SOLUTION ORAL DAILY
Status: DISCONTINUED | OUTPATIENT
Start: 2021-06-01 | End: 2021-06-02 | Stop reason: HOSPADM

## 2021-06-01 RX ORDER — DEXTROSE MONOHYDRATE 25 G/50ML
50 INJECTION, SOLUTION INTRAVENOUS
Status: DISCONTINUED | OUTPATIENT
Start: 2021-06-01 | End: 2021-06-01 | Stop reason: HOSPADM

## 2021-06-01 RX ORDER — DOCUSATE SODIUM 100 MG/1
100 CAPSULE, LIQUID FILLED ORAL 2 TIMES DAILY
Status: DISCONTINUED | OUTPATIENT
Start: 2021-06-01 | End: 2021-06-02 | Stop reason: HOSPADM

## 2021-06-01 RX ORDER — HALOPERIDOL 5 MG/ML
1 INJECTION INTRAMUSCULAR
Status: DISCONTINUED | OUTPATIENT
Start: 2021-06-01 | End: 2021-06-01 | Stop reason: HOSPADM

## 2021-06-01 RX ORDER — SODIUM CHLORIDE 9 MG/ML
INJECTION, SOLUTION INTRAVENOUS EVERY 6 HOURS
Status: COMPLETED | OUTPATIENT
Start: 2021-06-01 | End: 2021-06-01

## 2021-06-01 RX ORDER — HEPARIN SODIUM 5000 [USP'U]/ML
5000 INJECTION, SOLUTION INTRAVENOUS; SUBCUTANEOUS EVERY 8 HOURS
Status: DISCONTINUED | OUTPATIENT
Start: 2021-06-01 | End: 2021-06-02 | Stop reason: HOSPADM

## 2021-06-01 RX ORDER — DEXAMETHASONE SODIUM PHOSPHATE 4 MG/ML
4 INJECTION, SOLUTION INTRA-ARTICULAR; INTRALESIONAL; INTRAMUSCULAR; INTRAVENOUS; SOFT TISSUE
Status: DISCONTINUED | OUTPATIENT
Start: 2021-06-01 | End: 2021-06-02 | Stop reason: HOSPADM

## 2021-06-01 RX ORDER — ACETAMINOPHEN 10 MG/ML
1 INJECTION, SOLUTION INTRAVENOUS ONCE
Status: COMPLETED | OUTPATIENT
Start: 2021-06-01 | End: 2021-06-01

## 2021-06-01 RX ORDER — ROCURONIUM BROMIDE 10 MG/ML
INJECTION, SOLUTION INTRAVENOUS PRN
Status: DISCONTINUED | OUTPATIENT
Start: 2021-06-01 | End: 2021-06-01 | Stop reason: SURG

## 2021-06-01 RX ORDER — OXYCODONE HYDROCHLORIDE 10 MG/1
10 TABLET ORAL
Status: DISCONTINUED | OUTPATIENT
Start: 2021-06-01 | End: 2021-06-02 | Stop reason: HOSPADM

## 2021-06-01 RX ORDER — ATROPA BELLADONNA AND OPIUM 16.2; 6 MG/1; MG/1
SUPPOSITORY RECTAL
Status: DISCONTINUED | OUTPATIENT
Start: 2021-06-01 | End: 2021-06-01 | Stop reason: HOSPADM

## 2021-06-01 RX ORDER — ONDANSETRON 2 MG/ML
INJECTION INTRAMUSCULAR; INTRAVENOUS PRN
Status: DISCONTINUED | OUTPATIENT
Start: 2021-06-01 | End: 2021-06-01 | Stop reason: SURG

## 2021-06-01 RX ORDER — HYDROMORPHONE HYDROCHLORIDE 1 MG/ML
0.5 INJECTION, SOLUTION INTRAMUSCULAR; INTRAVENOUS; SUBCUTANEOUS
Status: DISCONTINUED | OUTPATIENT
Start: 2021-06-01 | End: 2021-06-02 | Stop reason: HOSPADM

## 2021-06-01 RX ORDER — ONDANSETRON 2 MG/ML
4 INJECTION INTRAMUSCULAR; INTRAVENOUS
Status: DISCONTINUED | OUTPATIENT
Start: 2021-06-01 | End: 2021-06-01 | Stop reason: HOSPADM

## 2021-06-01 RX ORDER — HYDROMORPHONE HYDROCHLORIDE 1 MG/ML
0.4 INJECTION, SOLUTION INTRAMUSCULAR; INTRAVENOUS; SUBCUTANEOUS
Status: DISCONTINUED | OUTPATIENT
Start: 2021-06-01 | End: 2021-06-01 | Stop reason: HOSPADM

## 2021-06-01 RX ORDER — OXYCODONE HYDROCHLORIDE AND ACETAMINOPHEN 5; 325 MG/1; MG/1
1 TABLET ORAL
Status: COMPLETED | OUTPATIENT
Start: 2021-06-01 | End: 2021-06-01

## 2021-06-01 RX ORDER — SODIUM CHLORIDE, SODIUM LACTATE, POTASSIUM CHLORIDE, CALCIUM CHLORIDE 600; 310; 30; 20 MG/100ML; MG/100ML; MG/100ML; MG/100ML
INJECTION, SOLUTION INTRAVENOUS CONTINUOUS
Status: DISCONTINUED | OUTPATIENT
Start: 2021-06-01 | End: 2021-06-01 | Stop reason: HOSPADM

## 2021-06-01 RX ORDER — HALOPERIDOL 5 MG/ML
1 INJECTION INTRAMUSCULAR EVERY 6 HOURS PRN
Status: DISCONTINUED | OUTPATIENT
Start: 2021-06-01 | End: 2021-06-02 | Stop reason: HOSPADM

## 2021-06-01 RX ORDER — HYDROMORPHONE HYDROCHLORIDE 1 MG/ML
0.2 INJECTION, SOLUTION INTRAMUSCULAR; INTRAVENOUS; SUBCUTANEOUS
Status: DISCONTINUED | OUTPATIENT
Start: 2021-06-01 | End: 2021-06-01 | Stop reason: HOSPADM

## 2021-06-01 RX ORDER — PHENYLEPHRINE HCL IN 0.9% NACL 0.5 MG/5ML
SYRINGE (ML) INTRAVENOUS PRN
Status: DISCONTINUED | OUTPATIENT
Start: 2021-06-01 | End: 2021-06-01 | Stop reason: SURG

## 2021-06-01 RX ORDER — ACETAMINOPHEN 500 MG
1000 TABLET ORAL EVERY 6 HOURS PRN
Status: DISCONTINUED | OUTPATIENT
Start: 2021-06-06 | End: 2021-06-02 | Stop reason: HOSPADM

## 2021-06-01 RX ORDER — DIPHENHYDRAMINE HYDROCHLORIDE 50 MG/ML
12.5 INJECTION INTRAMUSCULAR; INTRAVENOUS
Status: DISCONTINUED | OUTPATIENT
Start: 2021-06-01 | End: 2021-06-01 | Stop reason: HOSPADM

## 2021-06-01 RX ORDER — ACETAMINOPHEN 500 MG
1000 TABLET ORAL EVERY 6 HOURS
Status: DISCONTINUED | OUTPATIENT
Start: 2021-06-01 | End: 2021-06-02 | Stop reason: HOSPADM

## 2021-06-01 RX ORDER — IBUPROFEN 800 MG/1
800 TABLET ORAL 3 TIMES DAILY PRN
Status: DISCONTINUED | OUTPATIENT
Start: 2021-06-04 | End: 2021-06-02 | Stop reason: HOSPADM

## 2021-06-01 RX ORDER — BUPIVACAINE HYDROCHLORIDE AND EPINEPHRINE 5; 5 MG/ML; UG/ML
INJECTION, SOLUTION EPIDURAL; INTRACAUDAL; PERINEURAL
Status: DISCONTINUED | OUTPATIENT
Start: 2021-06-01 | End: 2021-06-01 | Stop reason: HOSPADM

## 2021-06-01 RX ORDER — CEFAZOLIN SODIUM 1 G/3ML
INJECTION, POWDER, FOR SOLUTION INTRAMUSCULAR; INTRAVENOUS PRN
Status: DISCONTINUED | OUTPATIENT
Start: 2021-06-01 | End: 2021-06-01 | Stop reason: HOSPADM

## 2021-06-01 RX ORDER — MIDAZOLAM HYDROCHLORIDE 1 MG/ML
1 INJECTION INTRAMUSCULAR; INTRAVENOUS
Status: DISCONTINUED | OUTPATIENT
Start: 2021-06-01 | End: 2021-06-01 | Stop reason: HOSPADM

## 2021-06-01 RX ORDER — OXYCODONE HYDROCHLORIDE AND ACETAMINOPHEN 5; 325 MG/1; MG/1
2 TABLET ORAL
Status: COMPLETED | OUTPATIENT
Start: 2021-06-01 | End: 2021-06-01

## 2021-06-01 RX ORDER — ATROPA BELLADONNA AND OPIUM 16.2; 6 MG/1; MG/1
60 SUPPOSITORY RECTAL EVERY 12 HOURS PRN
Status: DISCONTINUED | OUTPATIENT
Start: 2021-06-01 | End: 2021-06-02 | Stop reason: HOSPADM

## 2021-06-01 RX ORDER — SUCCINYLCHOLINE/SOD CL,ISO/PF 200MG/10ML
SYRINGE (ML) INTRAVENOUS PRN
Status: DISCONTINUED | OUTPATIENT
Start: 2021-06-01 | End: 2021-06-01 | Stop reason: SURG

## 2021-06-01 RX ORDER — SODIUM CHLORIDE, SODIUM LACTATE, POTASSIUM CHLORIDE, CALCIUM CHLORIDE 600; 310; 30; 20 MG/100ML; MG/100ML; MG/100ML; MG/100ML
INJECTION, SOLUTION INTRAVENOUS CONTINUOUS
Status: ACTIVE | OUTPATIENT
Start: 2021-06-01 | End: 2021-06-01

## 2021-06-01 RX ORDER — KETOROLAC TROMETHAMINE 30 MG/ML
INJECTION, SOLUTION INTRAMUSCULAR; INTRAVENOUS PRN
Status: DISCONTINUED | OUTPATIENT
Start: 2021-06-01 | End: 2021-06-01 | Stop reason: SURG

## 2021-06-01 RX ORDER — ONDANSETRON 2 MG/ML
4 INJECTION INTRAMUSCULAR; INTRAVENOUS EVERY 4 HOURS PRN
Status: DISCONTINUED | OUTPATIENT
Start: 2021-06-01 | End: 2021-06-02 | Stop reason: HOSPADM

## 2021-06-01 RX ORDER — MIDAZOLAM HYDROCHLORIDE 1 MG/ML
INJECTION INTRAMUSCULAR; INTRAVENOUS PRN
Status: DISCONTINUED | OUTPATIENT
Start: 2021-06-01 | End: 2021-06-01 | Stop reason: SURG

## 2021-06-01 RX ORDER — DEXTROSE MONOHYDRATE 25 G/50ML
50 INJECTION, SOLUTION INTRAVENOUS
Status: DISCONTINUED | OUTPATIENT
Start: 2021-06-01 | End: 2021-06-02 | Stop reason: HOSPADM

## 2021-06-01 RX ORDER — SCOLOPAMINE TRANSDERMAL SYSTEM 1 MG/1
1 PATCH, EXTENDED RELEASE TRANSDERMAL
Status: DISCONTINUED | OUTPATIENT
Start: 2021-06-01 | End: 2021-06-02 | Stop reason: HOSPADM

## 2021-06-01 RX ORDER — HYDROMORPHONE HYDROCHLORIDE 1 MG/ML
0.1 INJECTION, SOLUTION INTRAMUSCULAR; INTRAVENOUS; SUBCUTANEOUS
Status: DISCONTINUED | OUTPATIENT
Start: 2021-06-01 | End: 2021-06-01 | Stop reason: HOSPADM

## 2021-06-01 RX ORDER — OXYCODONE HYDROCHLORIDE 5 MG/1
5 TABLET ORAL
Status: DISCONTINUED | OUTPATIENT
Start: 2021-06-01 | End: 2021-06-02 | Stop reason: HOSPADM

## 2021-06-01 RX ORDER — MEPERIDINE HYDROCHLORIDE 25 MG/ML
12.5 INJECTION INTRAMUSCULAR; INTRAVENOUS; SUBCUTANEOUS
Status: DISCONTINUED | OUTPATIENT
Start: 2021-06-01 | End: 2021-06-01 | Stop reason: HOSPADM

## 2021-06-01 RX ORDER — KETOROLAC TROMETHAMINE 30 MG/ML
15 INJECTION, SOLUTION INTRAMUSCULAR; INTRAVENOUS EVERY 6 HOURS
Status: DISCONTINUED | OUTPATIENT
Start: 2021-06-01 | End: 2021-06-02 | Stop reason: HOSPADM

## 2021-06-01 RX ORDER — HYDROXYZINE HYDROCHLORIDE 25 MG/1
25 TABLET, FILM COATED ORAL NIGHTLY PRN
Status: DISCONTINUED | OUTPATIENT
Start: 2021-06-01 | End: 2021-06-02 | Stop reason: HOSPADM

## 2021-06-01 RX ADMIN — KETOROLAC TROMETHAMINE 15 MG: 30 INJECTION, SOLUTION INTRAMUSCULAR; INTRAVENOUS at 23:22

## 2021-06-01 RX ADMIN — SODIUM CHLORIDE, POTASSIUM CHLORIDE, SODIUM LACTATE AND CALCIUM CHLORIDE: 600; 310; 30; 20 INJECTION, SOLUTION INTRAVENOUS at 09:51

## 2021-06-01 RX ADMIN — KETOROLAC TROMETHAMINE 15 MG: 30 INJECTION, SOLUTION INTRAMUSCULAR; INTRAVENOUS at 17:35

## 2021-06-01 RX ADMIN — PROPOFOL 200 MG: 10 INJECTION, EMULSION INTRAVENOUS at 10:50

## 2021-06-01 RX ADMIN — EPHEDRINE SULFATE 25 MG: 50 INJECTION, SOLUTION INTRAVENOUS at 10:50

## 2021-06-01 RX ADMIN — OXYCODONE HYDROCHLORIDE AND ACETAMINOPHEN 1 TABLET: 5; 325 TABLET ORAL at 13:53

## 2021-06-01 RX ADMIN — ACETAMINOPHEN 1 G: 10 INJECTION, SOLUTION INTRAVENOUS at 10:01

## 2021-06-01 RX ADMIN — ONDANSETRON 4 MG: 2 INJECTION INTRAMUSCULAR; INTRAVENOUS at 10:50

## 2021-06-01 RX ADMIN — ACETAMINOPHEN 1000 MG: 500 TABLET ORAL at 17:35

## 2021-06-01 RX ADMIN — Medication 100 MG: at 10:50

## 2021-06-01 RX ADMIN — INSULIN GLARGINE 30 UNITS: 100 INJECTION, SOLUTION SUBCUTANEOUS at 17:36

## 2021-06-01 RX ADMIN — SUGAMMADEX 200 MG: 100 INJECTION, SOLUTION INTRAVENOUS at 13:01

## 2021-06-01 RX ADMIN — EPHEDRINE SULFATE 25 MG: 50 INJECTION, SOLUTION INTRAVENOUS at 11:32

## 2021-06-01 RX ADMIN — KETOROLAC TROMETHAMINE 30 MG: 30 INJECTION, SOLUTION INTRAMUSCULAR at 13:01

## 2021-06-01 RX ADMIN — INSULIN HUMAN 3 UNITS: 100 INJECTION, SOLUTION PARENTERAL at 21:32

## 2021-06-01 RX ADMIN — MIDAZOLAM HYDROCHLORIDE 2 MG: 1 INJECTION, SOLUTION INTRAMUSCULAR; INTRAVENOUS at 10:50

## 2021-06-01 RX ADMIN — FENTANYL CITRATE 250 MCG: 50 INJECTION, SOLUTION INTRAMUSCULAR; INTRAVENOUS at 10:50

## 2021-06-01 RX ADMIN — SODIUM CHLORIDE: 9 INJECTION, SOLUTION INTRAVENOUS at 14:28

## 2021-06-01 RX ADMIN — CEFAZOLIN 2 G: 330 INJECTION, POWDER, FOR SOLUTION INTRAMUSCULAR; INTRAVENOUS at 10:50

## 2021-06-01 RX ADMIN — DOCUSATE SODIUM 100 MG: 100 CAPSULE, LIQUID FILLED ORAL at 17:35

## 2021-06-01 RX ADMIN — Medication 200 MCG: at 12:40

## 2021-06-01 RX ADMIN — POVIDONE IODINE 15 ML: 100 SOLUTION TOPICAL at 09:52

## 2021-06-01 RX ADMIN — ROCURONIUM BROMIDE 50 MG: 10 INJECTION, SOLUTION INTRAVENOUS at 10:50

## 2021-06-01 RX ADMIN — ACETAMINOPHEN 1000 MG: 500 TABLET ORAL at 23:22

## 2021-06-01 ASSESSMENT — PAIN DESCRIPTION - PAIN TYPE
TYPE: SURGICAL PAIN
TYPE: SURGICAL PAIN
TYPE: SURGICAL PAIN;ACUTE PAIN
TYPE: SURGICAL PAIN;ACUTE PAIN
TYPE: SURGICAL PAIN
TYPE: ACUTE PAIN
TYPE: SURGICAL PAIN
TYPE: SURGICAL PAIN

## 2021-06-01 ASSESSMENT — COGNITIVE AND FUNCTIONAL STATUS - GENERAL
SUGGESTED CMS G CODE MODIFIER DAILY ACTIVITY: CH
MOBILITY SCORE: 22
DAILY ACTIVITIY SCORE: 24
WALKING IN HOSPITAL ROOM: A LITTLE
STANDING UP FROM CHAIR USING ARMS: A LITTLE
SUGGESTED CMS G CODE MODIFIER MOBILITY: CJ

## 2021-06-01 ASSESSMENT — LIFESTYLE VARIABLES
DOES PATIENT WANT TO STOP DRINKING: NO
HAVE PEOPLE ANNOYED YOU BY CRITICIZING YOUR DRINKING: NO
EVER FELT BAD OR GUILTY ABOUT YOUR DRINKING: NO
HOW MANY TIMES IN THE PAST YEAR HAVE YOU HAD 5 OR MORE DRINKS IN A DAY: 0
TOTAL SCORE: 0
ALCOHOL_USE: NO
EVER HAD A DRINK FIRST THING IN THE MORNING TO STEADY YOUR NERVES TO GET RID OF A HANGOVER: NO
CONSUMPTION TOTAL: NEGATIVE
AVERAGE NUMBER OF DAYS PER WEEK YOU HAVE A DRINK CONTAINING ALCOHOL: 0
HAVE YOU EVER FELT YOU SHOULD CUT DOWN ON YOUR DRINKING: NO
TOTAL SCORE: 0
TOTAL SCORE: 0
ON A TYPICAL DAY WHEN YOU DRINK ALCOHOL HOW MANY DRINKS DO YOU HAVE: 0

## 2021-06-01 ASSESSMENT — PATIENT HEALTH QUESTIONNAIRE - PHQ9
1. LITTLE INTEREST OR PLEASURE IN DOING THINGS: NOT AT ALL
2. FEELING DOWN, DEPRESSED, IRRITABLE, OR HOPELESS: NOT AT ALL
SUM OF ALL RESPONSES TO PHQ9 QUESTIONS 1 AND 2: 0

## 2021-06-01 ASSESSMENT — FIBROSIS 4 INDEX: FIB4 SCORE: 1

## 2021-06-01 ASSESSMENT — PAIN SCALES - GENERAL: PAIN_LEVEL: 3

## 2021-06-01 NOTE — PROGRESS NOTES
2 RN skin check complete.     Devices in place: SCD's bilaterally, SpO2 finger probe, PIV line, KRISTINA drain, CBI with stat lock.   Skin assessed under devices     Surgical Incision - x5 well approximated with dermabond, no evidence of opening areas.    Confirmed pressure ulcers found on: NA  New potential ulcers noted on: NA  Wound consult placed: NA     -Skin beneath folds checked.   - All bony prominences assessed. Skin intact.     Preventative measures in place:  - turns with use of pillows to support repositioning  -heels floated on pillows    -bony prominences floated on pillows  -mobilization  -repositioning of devices   -Offered tooth brushing  -Waffle Mattress overlay in place  -Cue for turning while in bed

## 2021-06-01 NOTE — PROGRESS NOTES
Asaf admitted to room T407-02 via transport in [facility] from PACU at 1450.      Patient reports pain at 6 on a scale of 0-10. Educated patient regarding pharmacologic and non pharmacologic modalities for pain management. Oriented to room call light and smoking policy.  Reviewed plan of care (equipment, incentive spirometer, sequential compression devices, medications, activity, diet, fall precautions, skin care, and pain) with patient and family. Welcome packet given and reviewed with patient, all questions answered. Education provided on oral hygiene program.     AA&Ox4. Denies CP/SOB.  See 2 RN skin note  Tolerating sips at this time. Denies N/V.  CBI in place.  Last BM PTA per pt report  Pt ambulates with hand held assist.  All needs met at this time. Call light within reach. Pt calls appropriately. Bed low and locked, non skid socks in place. Hourly rounding in place.

## 2021-06-01 NOTE — CARE PLAN
Problem: Pain - Standard  Goal: Alleviation of pain or a reduction in pain to the patient’s comfort goal  Outcome: Progressing  Note: Education provided regarding non pharmacologic and pharmacologic modalities for pain management. Medications administered per MAR. Patient educated regarding medications and side effects. Education provided regarding pain management and pain rating scale.       Problem: Knowledge Deficit - Standard  Goal: Patient and family/care givers will demonstrate understanding of plan of care, disease process/condition, diagnostic tests and medications  Outcome: Progressing  Note:  Oriented to room call light and smoking policy.  Reviewed plan of care (equipment, incentive spirometer, sequential compression devices, medications, activity, diet, fall precautions, skin care, and pain) with patient and family. Welcome packet given and reviewed with patient, all questions answered. Education provided on oral hygiene program.      The patient is Stable - Low risk of patient condition declining or worsening    Shift Goals  Clinical Goals: CBI with clear/pink output  Patient Goals: pain management    Progress made toward(s) clinical / shift goals: CBI titrated to maintain clear/pink output. Pain managed per MAR. Patient reports tolerable level of pain at this time    Patient is not progressing towards the following goals:

## 2021-06-01 NOTE — ANESTHESIA PREPROCEDURE EVALUATION
Relevant Problems   NEURO   (positive) H/O back injury      ENDO   (positive) DM type 2 (diabetes mellitus, type 2) ([facility])       Physical Exam    Airway   Mallampati: II  TM distance: >3 FB  Neck ROM: full       Cardiovascular - normal exam  Rhythm: regular  Rate: normal  (-) murmur     Dental - normal exam           Pulmonary - normal exam  Breath sounds clear to auscultation     Abdominal    Neurological - normal exam                 Anesthesia Plan    ASA 2       Plan - general       Airway plan will be ETT          Induction: intravenous    Postoperative Plan: Postoperative administration of opioids is intended.    Pertinent diagnostic labs and testing reviewed    Informed Consent:    Anesthetic plan and risks discussed with patient.    Use of blood products discussed with: patient whom consented to blood products.

## 2021-06-01 NOTE — ANESTHESIA PROCEDURE NOTES
Airway    Date/Time: 6/1/2021 10:50 AM  Performed by: Onel Henderson M.D.  Authorized by: Onel Henderson M.D.     Location:  OR  Urgency:  Elective  Indications for Airway Management:  Anesthesia      Spontaneous Ventilation: absent    Sedation Level:  Deep  Preoxygenated: Yes    Patient Position:  Sniffing  Final Airway Type:  Endotracheal airway  Final Endotracheal Airway:  ETT  Cuffed: Yes    Technique Used for Successful ETT Placement:  Direct laryngoscopy    Insertion Site:  Oral  Blade Type:  Edd  Laryngoscope Blade/Videolaryngoscope Blade Size:  4  ETT Size (mm):  7.0  Measured from:  Teeth  ETT to Teeth (cm):  22  Placement Verified by: auscultation and capnometry    Cormack-Lehane Classification:  Grade I - full view of glottis  Number of Attempts at Approach:  1

## 2021-06-01 NOTE — ANESTHESIA POSTPROCEDURE EVALUATION
Patient: John Reyes    Procedure Summary     Date: 06/01/21 Room / Location: [place] [address] / SURGERY [facility]    Anesthesia Start: 1050 Anesthesia Stop:     Procedure: PROSTATECTOMY, SIMPLE, ROBOT-ASSISTED, USING DA NACHO XI (Abdomen) Diagnosis: (BENIGN PROSTATIC HYPERTROPHY WITH OUTFLOW OBSTRUCTION)    Surgeons: Ruslan Beltran M.D. Responsible Provider: Onel Henderson M.D.    Anesthesia Type: general ASA Status: 2          Final Anesthesia Type: general  Last vitals  BP   Blood Pressure : 145/86    Temp   36.6 °C (97.8 °F)    Pulse   67   Resp   18    SpO2   93 %      Anesthesia Post Evaluation    Patient location during evaluation: PACU  Patient participation: complete - patient participated  Level of consciousness: awake and alert  Pain score: 3    Airway patency: patent  Anesthetic complications: no  Cardiovascular status: hemodynamically stable  Respiratory status: acceptable  Hydration status: euvolemic    PONV: none          No complications documented.     Nurse Pain Score: 4 (NPRS)

## 2021-06-01 NOTE — PROGRESS NOTES
Patient is AO x 4, RASS -1, and had mild pain treated with 5-325 Percocet PO.  Patient states he is generally sensitive to pain medications and they can make him feel uncomfortably drowsy.  Patient states pain is tolerable after medication.  Surgical sites x 5 CDI.  KRISTINA in place / patent.  CBI running wide open in PACU.  Urine is red in color, but does appear to be clearing slightly after 1 hour in PACU.  Tolerating sips of PO clears.  VSS on 1L NC.  POC reviewed, PIV verified, and safety protocols in place.  Gladys Black updated over the phone and is aware of room assignment.  Report to Susan MONTAÑO.  Transported by transport on 1L NC, tank at full.

## 2021-06-01 NOTE — ANESTHESIA TIME REPORT
Anesthesia Start and Stop Event Times     Date Time Event    6/1/2021 0844 Ready for Procedure     1050 Anesthesia Start     1316 Anesthesia Stop        Responsible Staff  06/01/21    Name Role Begin End    Onel Henderson M.D. Anesth 1050 1316        Preop Diagnosis (Free Text):  Pre-op Diagnosis     BENIGN PROSTATIC HYPERTROPHY WITH OUTFLOW OBSTRUCTION        Preop Diagnosis (Codes):    Post op Diagnosis  BPH with urinary obstruction      Premium Reason  Non-Premium    Comments:

## 2021-06-01 NOTE — OP REPORT
[facility] Operative Report    Pre-operative Diagnosis: 1. Severe BPH with bladder outlet obstruction  2. Prostatomegaly  3. Diabetes Mellitus  4. HTN   DATE of procedure 6/1/2021    Post-operative Diagnosis: Same as above   Procedure 1. Robotic Assisted Simple Prostatectomy   Attending: Ruslan Beltran M.D., MD   Assistant: Surgeon(s):  DARRYL Kaiser M.D.    Anesthesia: Et, General  Anesthesiologist: Onel Henderson M.D.    Estimated Blood Loss: 300cc   IV fluids 2 Lcrystalloid    Specimens: 1. Prostate adenoma   Drains: 1. 15F janet drain in RLQ  2. 24F three way kruger with CBI and >30cc in balloon   Complications: None   Wound class II clean contaminated   Condition: Stable, procedure well tolerated    Disposition:  PACU, DC POD1 if doing well, kruger x7days.  Does NOT need cystogram prior to kruger removal post op.     Findings: 1. Bulky and obstructive median lobe, large and obstrucctve lateral lobes, normal anatomy, adenoma removed.  2. 122g of adenoma removed     Indications for Procedure:  66 y.o. male with severe prsotatomegaly and history of LUTS refractory to meds. After a full discussion of alternatives, risks, and benefits the patient consented to proceeding with robotic assisted simple prostatectomy. Risks of procedure discussed but not limited to included injury to surrounding organs structures, infection, bleeding, air embolus, post op abscess requiring drain, incontinence, ED, possible urine leak requiring extended catheter, hernia, and the cardiovascular and pulmonary complications of surgery including MI, PE, DVT etc.     Procedure in Detail:  The patient was brought into the operating room and placed on the table supine. General anesthesia was induced, and he was placed in the lithotomy position. He was secured to the table and all pressure points protected. ancef IV was given for perioperative antibiotics. SCDs were placed. A time out was performed to confirm  correct patient and procedure. He was placed in reverse trendelenberg position. After routine prepping and draping, an 16-Croatian Kruger catheter was placed into the urethra.       We started by making an 8mm vertical incision superior to the umbilicus. A Veress needle was introduced through the incision and after confirming a low initial pressure and passing the drop test, the abdomen was insufflated. A 8mm port was placed through the incision carefully into the peritoneum. The abdominal cavity was inspected and there was no evidence of any injury to the bowel or vascular structures. The additional ports were then placed as follows: an 8 mm Da Alyssa port 8 mm lateral to the right of the camera port and two 8 mm Da Alyssa ports to the left of the camera port. A 12 mm assistant port was placed in the right lower quadrant and a 5mm assist port was placed in RUQ. A Kuldip was used to pre-place an 0 vicryl fascial stitch at the assistant port.      The Da Alyssa Xi robot was then docked.  The bladder was then distended with 200 to 300 cc of sterile saline.  The bladder was opened near the dome or just below with a transverse incision.  The fluid was drained from the bladder and the bladder limits were inspected.  The incision was extended transversely until the prostate could be adequately visualized.  This incision the kruger catheter was pulled anteriorly and the bladder incision was extended bilaterally to open the bladder enough to allow good visualization of bilateral ureteral orifices and the enlarged prostate including median lobe.  Next a robotic tenaculum grasper was placed in the median lobe of prostate which was retracted anteriorly with 4th arm and kruger backed out.      We made incision along the inferior aspect of median lobe taking care to be well away from ureteral orifices. We carried this down onto the adenoma finding the more avascular plane between prostate capsule and adenoma. This was developed  posteriorly and extended circumferentially around the median and lateral lobes taking down overlying bladder as needed to extend this around.  We continued with combination of  Blunt and electrocautery to bring this all the way around anteriorly as we shelled out the adenoma. Once we began to approach apex of prostate a robotic tenaculum instrument was used to retract the prostate.  After adequately mobilized, we came through urethra with cautery while continuing to sweep back the adenoma.  Once through the urethra the remaining posterior or lateral attachments were freed, and the adenoma was placed in a 10mm endocatch retrieval bag.  Hemostasis was then achieved by using spot monopolar and bipolar cautery on any obvious bleeders.  A double-armed 12 inch strata fix suture with RB1 needles was used to reapproximate the bladder neck down to the posterior urethral lip as a re-trigonization of the bladder. This was then run around circumferentially approximating bladde rmucosa to urethral mucosa. Insufllation was reduced and there was adequate hemostasis.  At this point the 24F kruger catheter was inserted and we moved on to bladder closure. The bladder was closed with 2-0 stratafix 9inch suture on CT-1 needle taking full-thickness bladder bites and running two sutures from both lateral aspects which were tied in the middle.  The balloon was inflated to 30cc, and the closure tested with 180 cc of saline without any significant leak.  2.5cc of evicel was placed along the suture line. a 18F round drain was then placed through one of the robotic parts and secured in position with permanent suture. The robot was undocked at this point.    The fascia of the 12m assistant port was extended and fascia divided until the specimen could be easily removed.  The midline extraction site fascia was closed with two running 0 Vicryl. The subcutaneous tissues of the midline extraction site were reapproximated with a 2-0 running vicryl.  The skin of all the larger sites were then closed with subcuticular 4-0 monocryl stitches and the small port sites were dressed with dermabond. Marcaine 0.5% was injected at all incision sites for local anesthesia. The Karthik-Hogue drain was placed on bulb suction and the Martinez catheter was placed on gravity drainage.     The patient tolerated this procedure well and awoke from anesthesia uneventfully. He was taken to the recovery room in stable condition. All needle, sponge, and instrument counts were correct x 2.       Ruslan Beltran MD  [address] [place] [address] [id number]  [place] [id number]  [phone]

## 2021-06-02 VITALS
BODY MASS INDEX: 32.25 KG/M2 | DIASTOLIC BLOOD PRESSURE: 62 MMHG | WEIGHT: 238.1 LBS | HEART RATE: 86 BPM | TEMPERATURE: 98.5 F | RESPIRATION RATE: 18 BRPM | OXYGEN SATURATION: 91 % | HEIGHT: 72 IN | SYSTOLIC BLOOD PRESSURE: 106 MMHG

## 2021-06-02 PROBLEM — G89.18 POSTOPERATIVE PAIN: Status: ACTIVE | Noted: 2021-06-02

## 2021-06-02 LAB
ANION GAP SERPL CALC-SCNC: 8 MMOL/L (ref 7–16)
BUN SERPL-MCNC: 20 MG/DL (ref 8–22)
CALCIUM SERPL-MCNC: 8.2 MG/DL (ref 8.5–10.5)
CHLORIDE SERPL-SCNC: 104 MMOL/L (ref 96–112)
CO2 SERPL-SCNC: 27 MMOL/L (ref 20–33)
CREAT SERPL-MCNC: 1.3 MG/DL (ref 0.5–1.4)
ERYTHROCYTE [DISTWIDTH] IN BLOOD BY AUTOMATED COUNT: 51.6 FL (ref 35.9–50)
GLUCOSE BLD-MCNC: 148 MG/DL (ref 65–99)
GLUCOSE BLD-MCNC: 235 MG/DL (ref 65–99)
GLUCOSE SERPL-MCNC: 150 MG/DL (ref 65–99)
HCT VFR BLD AUTO: 40.1 % (ref 42–52)
HGB BLD-MCNC: 12.6 G/DL (ref 14–18)
MCH RBC QN AUTO: 29.3 PG (ref 27–33)
MCHC RBC AUTO-ENTMCNC: 31.4 G/DL (ref 33.7–35.3)
MCV RBC AUTO: 93.3 FL (ref 81.4–97.8)
PLATELET # BLD AUTO: 243 K/UL (ref 164–446)
PMV BLD AUTO: 10.3 FL (ref 9–12.9)
POTASSIUM SERPL-SCNC: 4.2 MMOL/L (ref 3.6–5.5)
RBC # BLD AUTO: 4.3 M/UL (ref 4.7–6.1)
SODIUM SERPL-SCNC: 139 MMOL/L (ref 135–145)
WBC # BLD AUTO: 11.5 K/UL (ref 4.8–10.8)

## 2021-06-02 PROCEDURE — 85027 COMPLETE CBC AUTOMATED: CPT

## 2021-06-02 PROCEDURE — 82962 GLUCOSE BLOOD TEST: CPT | Mod: 91

## 2021-06-02 PROCEDURE — G0378 HOSPITAL OBSERVATION PER HR: HCPCS

## 2021-06-02 PROCEDURE — 700111 HCHG RX REV CODE 636 W/ 250 OVERRIDE (IP): Performed by: UROLOGY

## 2021-06-02 PROCEDURE — 700102 HCHG RX REV CODE 250 W/ 637 OVERRIDE(OP): Performed by: UROLOGY

## 2021-06-02 PROCEDURE — 96372 THER/PROPH/DIAG INJ SC/IM: CPT

## 2021-06-02 PROCEDURE — 80048 BASIC METABOLIC PNL TOTAL CA: CPT

## 2021-06-02 PROCEDURE — A9270 NON-COVERED ITEM OR SERVICE: HCPCS | Performed by: UROLOGY

## 2021-06-02 PROCEDURE — 96376 TX/PRO/DX INJ SAME DRUG ADON: CPT

## 2021-06-02 RX ADMIN — DOCUSATE SODIUM 100 MG: 100 CAPSULE, LIQUID FILLED ORAL at 05:36

## 2021-06-02 RX ADMIN — KETOROLAC TROMETHAMINE 15 MG: 30 INJECTION, SOLUTION INTRAMUSCULAR; INTRAVENOUS at 12:28

## 2021-06-02 RX ADMIN — POLYETHYLENE GLYCOL 3350 1 PACKET: 17 POWDER, FOR SOLUTION ORAL at 05:37

## 2021-06-02 RX ADMIN — HEPARIN SODIUM 5000 UNITS: 5000 INJECTION, SOLUTION INTRAVENOUS; SUBCUTANEOUS at 13:50

## 2021-06-02 RX ADMIN — ACETAMINOPHEN 1000 MG: 500 TABLET ORAL at 12:28

## 2021-06-02 RX ADMIN — KETOROLAC TROMETHAMINE 15 MG: 30 INJECTION, SOLUTION INTRAMUSCULAR; INTRAVENOUS at 05:37

## 2021-06-02 RX ADMIN — INSULIN HUMAN 3 UNITS: 100 INJECTION, SOLUTION PARENTERAL at 12:28

## 2021-06-02 ASSESSMENT — PAIN DESCRIPTION - PAIN TYPE
TYPE: ACUTE PAIN;SURGICAL PAIN
TYPE: SURGICAL PAIN;ACUTE PAIN
TYPE: SURGICAL PAIN

## 2021-06-02 ASSESSMENT — ENCOUNTER SYMPTOMS
FEVER: 0
ABDOMINAL PAIN: 1

## 2021-06-02 NOTE — PROGRESS NOTES
Patient doing well. Pain controlled. Urine watermelon with low flow CBI. Drain output stable. Will discharge home. KRISTINA to be removed. Martinez to stay. We will arrange follow up in office next week.

## 2021-06-02 NOTE — PROGRESS NOTES
Patient to be discharged today - patient aware and agreeable to plan. D/c instructions reviewed with patient - ?'s/concerns answered. Martinez catheter education provided. Patient educated on s&s of infection and mobility/weight restrictions.

## 2021-06-02 NOTE — DISCHARGE INSTRUCTIONS
Discharge Instructions    Discharged to home by car with relative. Discharged via wheelchair, hospital escort: Yes.  Special equipment needed: Not Applicable    Be sure to schedule a follow-up appointment with your primary care doctor or any specialists as instructed.     Discharge Plan:        I understand that a diet low in cholesterol, fat, and sodium is recommended for good health. Unless I have been given specific instructions below for another diet, I accept this instruction as my diet prescription.   Other diet: regular    Special Instructions: None    · Is patient discharged on Warfarin / Coumadin?   No     Depression / Suicide Risk    As you are discharged from this [facility] facility, it is important to learn how to keep safe from harming yourself.    Recognize the warning signs:  · Abrupt changes in personality, positive or negative- including increase in energy   · Giving away possessions  · Change in eating patterns- significant weight changes-  positive or negative  · Change in sleeping patterns- unable to sleep or sleeping all the time   · Unwillingness or inability to communicate  · Depression  · Unusual sadness, discouragement and loneliness  · Talk of wanting to die  · Neglect of personal appearance   · Rebelliousness- reckless behavior  · Withdrawal from people/activities they love  · Confusion- inability to concentrate     If you or a loved one observes any of these behaviors or has concerns about self-harm, here's what you can do:  · Talk about it- your feelings and reasons for harming yourself  · Remove any means that you might use to hurt yourself (examples: pills, rope, extension cords, firearm)  · Get professional help from the community (Mental Health, Substance Abuse, psychological counseling)  · Do not be alone:Call your Safe Contact- someone whom you trust who will be there for you.  · Call your local CRISIS HOTLINE 049-6829 or 046-978-1303  · Call your local Children's Mobile Crisis  Response Team [facility] (807) 466-2839 or www.Moodsnap  · Call the toll free National Suicide Prevention Hotlines   · National Suicide Prevention Lifeline 512-691-MKYD (9763)  · National Hope Line Network 800-SUICIDE (019-0703)        Robot-Assisted Laparoscopic Prostatectomy, Care After  This sheet gives you information about how to care for yourself after your procedure. Your health care provider may also give you more specific instructions. If you have problems or questions, contact your health care provider.  What can I expect after the procedure?  After the procedure, it is common to have:  · Mild pain in your lower abdomen.  · Blood in your urine for up to 3 weeks.  · A need to urinate more often than usual (bladder spasms). This may last for up to 2 weeks.  After your catheter is removed, it is common to have:  · A burning sensation when you urinate. This may last for up to 2 weeks after your catheter is removed.  · Difficulty controlling when you urinate (incontinence). You may leak urine occasionally. Your ability to control when you urinate will improve as you continue to heal.  Follow these instructions at home:  Medicines  · Take over-the-counter and prescription medicines only as told by your health care provider.  · Do not drive or use heavy machinery while taking prescription pain medicine.  · If you were prescribed an antibiotic medicine, take it as told by your health care provider. Do not stop taking the antibiotic even if you start to feel better.  · If you were given a stool softener, use it as told by your health care provider.  Incision care and drain care  · Follow instructions from your health care provider about how to take care of your incisions. Make sure you:  ? Wash your hands with soap and water before you change your bandage (dressing). If soap and water are not available, use hand .  ? Change your dressing as told by your health care provider.  ? Leave stitches  (sutures), skin glue, or adhesive strips in place. These skin closures may need to stay in place for 2 weeks or longer. If adhesive strip edges start to loosen and curl up, you may trim the loose edges. Do not remove adhesive strips completely unless your health care provider tells you to do that.  · If you have a drain, follow instructions from your health care provider about how to care for it. Do not remove the drain tube or any dressings around the tube opening unless your health care provider approves.  · Check your incision sites and drain area every day for signs of infection. Check for:  ? More redness, swelling, or pain.  ? More fluid or blood.  ? Warmth.  ? Pus or a bad smell.  Catheter care    · Always wash your hands with soap and water before and after touching your penis, your catheter, and your drainage bag. If soap and water are not available, use hand .  · Empty your drainage bag every 3 hours during the day, or as often as told by your health care provider.  · Clean the tip of your penis with soap and water two times a day, or as often as told by your health care provider.  · If you were given ointment to apply to the tip of your penis, follow instructions from your health care provider about how to do this.  · You will need to visit your health care provider to have your catheter removed. Your catheter may remain in place for up to 3 weeks after your procedure.  Activity  · Return to your normal activities, including driving, as told by your health care provider. Ask your health care provider what activities are safe for you.  · Do not lift anything that is heavier than 10 lb (4.5 kg) until your health care provider says that you can do this.  · Avoid intense physical activity for as long as told by your health care provider.  · Walk at least once a day. As you start to feel better, you may start to exercise more. Ask your health care provider what exercises are appropriate for  you.  Eating and drinking  · Follow instructions from your health care provider about eating or drinking restrictions.  · Drink enough fluid to keep your urine clear or pale yellow.  General instructions  · Do not take baths, swim, or use a hot tub until your health care provider approves.  · If your pelvic lymph nodes were removed for testing, it is up to you to get your test results. Ask your health care provider, or the department performing the test, when your results will be ready.  · Wear compression stockings as told by your health care provider. These stockings help to prevent blood clots and reduce swelling in your legs.  · Follow instructions from your health care provider about when it is safe for you to engage in sexual activity.  · Do not strain to have a bowel movement.  · Do not use any products that contain nicotine or tobacco, such as cigarettes and e-cigarettes. If you need help quitting, ask your health care provider.  · Keep all follow-up visits as told by your health care provider. This is important.  Contact a health care provider if:  · You have problems with your catheter.  · You have bladder spasms more than 3 or 4 times a day.  · You become constipated. Symptoms of constipation may include:  ? Having fewer than three bowel movements a week.  ? Difficulty having a bowel movement.  ? Having stools that are hard, dry, or larger than normal.  Get help right away if:  · You have more redness, swelling, or pain around your incisions, drain area, or the area where the catheter was inserted (catheter insertion site).  · You have more fluid or blood coming from your incision area, drain area, or catheter insertion site.  · Your incision area, drain area, or catheter insertion site feels warm to the touch.  · You have pus or a bad smell coming from your incision area, drain area, or catheter insertion site.  · You have a fever.  · You have pain that gets worse.  · Your catheter falls out.  · You are  unable to urinate or to pass urine through your catheter.  · You have more bright red blood in your urine.  · You have blood clots in your urine.  · You develop:  ? Chest pains.  ? Shortness of breath.  ? Swelling or pain in your legs.  This information is not intended to replace advice given to you by your health care provider. Make sure you discuss any questions you have with your health care provider.  Document Released: 2013 Document Revised: 2018 Document Reviewed: 2017  [employer] Patient Education ©  [employer]      Indwelling Urinary Catheter Care, Adult  An indwelling urinary catheter is a thin tube that is put into your bladder. The tube helps to drain pee (urine) out of your body. The tube goes in through your urethra. Your urethra is where pee comes out of your body. Your pee will come out through the catheter, then it will go into a bag (drainage bag).  Take good care of your catheter so it will work well.  How to wear your catheter and bag  Supplies needed  · Sticky tape (adhesive tape) or a leg strap.  · Alcohol wipe or soap and water (if you use tape).  · A clean towel (if you use tape).  · Large overnight bag.  · Smaller bag (leg bag).  Wearing your catheter  Attach your catheter to your leg with tape or a leg strap.  · Make sure the catheter is not pulled tight.  · If a leg strap gets wet, take it off and put on a dry strap.  · If you use tape to hold the bag on your le. Use an alcohol wipe or soap and water to wash your skin where the tape made it sticky before.  2. Use a clean towel to pat-dry that skin.  3. Use new tape to make the bag stay on your leg.  Wearing your bags  You should have been given a large overnight bag.  · You may wear the overnight bag in the day or night.  · Always have the overnight bag lower than your bladder.  Do not let the bag touch the floor.  · Before you go to sleep, put a clean plastic bag in a wastebasket. Then hang the overnight bag  inside the wastebasket.  You should also have a smaller leg bag that fits under your clothes.  · Always wear the leg bag below your knee.  · Do not wear your leg bag at night.  How to care for your skin and catheter  Supplies needed  · A clean washcloth.  · Water and mild soap.  · A clean towel.  Caring for your skin and catheter         · Clean the skin around your catheter every day:  ? Wash your hands with soap and water.  ? Wet a clean washcloth in warm water and mild soap.  ? Clean the skin around your urethra.  ? If you are female:  ? Gently spread the folds of skin around your vagina (labia).  ? With the washcloth in your other hand, wipe the inner side of your labia on each side. Wipe from front to back.  ? If you are male:  ? Pull back any skin that covers the end of your penis (foreskin).  ? With the washcloth in your other hand, wipe your penis in small circles. Start wiping at the tip of your penis, then move away from the catheter.  ? Move the foreskin back in place, if needed.  ? With your free hand, hold the catheter close to where it goes into your body.  ? Keep holding the catheter during cleaning so it does not get pulled out.  ? With the washcloth in your other hand, clean the catheter.  ? Only wipe downward on the catheter.  ? Do not wipe upward toward your body. Doing this may push germs into your urethra and cause infection.  ? Use a clean towel to pat-dry the catheter and the skin around it. Make sure to wipe off all soap.  ? Wash your hands with soap and water.  · Shower every day. Do not take baths.  · Do not use cream, ointment, or lotion on the area where the catheter goes into your body, unless your doctor tells you to.  · Do not use powders, sprays, or lotions on your genital area.  · Check your skin around the catheter every day for signs of infection. Check for:  ? Redness, swelling, or pain.  ? Fluid or blood.  ? Warmth.  ? Pus or a bad smell.  How to empty the bag  Supplies  needed  · Rubbing alcohol.  · Gauze pad or cotton ball.  · Tape or a leg strap.  Emptying the bag  Pour the pee out of your bag when it is ?-½ full, or at least 2-3 times a day. Do this for your overnight bag and your leg bag.  1. Wash your hands with soap and water.  2. Separate (detach) the bag from your leg.  3. Hold the bag over the toilet or a clean pail. Keep the bag lower than your hips and bladder. This is so the pee (urine) does not go back into the tube.  4. Open the pour spout. It is at the bottom of the bag.  5. Empty the pee into the toilet or pail. Do not let the pour spout touch any surface.  6. Put rubbing alcohol on a gauze pad or cotton ball.  7. Use the gauze pad or cotton ball to clean the pour spout.  8. Close the pour spout.  9. Attach the bag to your leg with tape or a leg strap.  10. Wash your hands with soap and water.  Follow instructions for cleaning the drainage bag:  · From the product maker.  · As told by your doctor.  How to change the bag  Supplies needed  · Alcohol wipes.  · A clean bag.  · Tape or a leg strap.  Changing the bag  Replace your bag when it starts to leak, smell bad, or look dirty.  1. Wash your hands with soap and water.  2. Separate the dirty bag from your leg.  3. Pinch the catheter with your fingers so that pee does not spill out.  4. Separate the catheter tube from the bag tube where these tubes connect (at the connection valve). Do not let the tubes touch any surface.  5. Clean the end of the catheter tube with an alcohol wipe. Use a different alcohol wipe to clean the end of the bag tube.  6. Connect the catheter tube to the tube of the clean bag.  7. Attach the clean bag to your leg with tape or a leg strap. Do not make the bag tight on your leg.  8. Wash your hands with soap and water.  General rules    · Never pull on your catheter. Never try to take it out. Doing that can hurt you.  · Always wash your hands before and after you touch your catheter or bag. Use  a mild, fragrance-free soap. If you do not have soap and water, use hand .  · Always make sure there are no twists or bends (kinks) in the catheter tube.  · Always make sure there are no leaks in the catheter or bag.  · Drink enough fluid to keep your pee pale yellow.  · Do not take baths, swim, or use a hot tub.  · If you are female, wipe from front to back after you poop (have a bowel movement).  Contact a doctor if:  · Your pee is cloudy.  · Your pee smells worse than usual.  · Your catheter gets clogged.  · Your catheter leaks.  · Your bladder feels full.  Get help right away if:  · You have redness, swelling, or pain where the catheter goes into your body.  · You have fluid, blood, pus, or a bad smell coming from the area where the catheter goes into your body.  · Your skin feels warm where the catheter goes into your body.  · You have a fever.  · You have pain in your:  ? Belly (abdomen).  ? Legs.  ? Lower back.  ? Bladder.  · You see blood in the catheter.  · Your pee is pink or red.  · You feel sick to your stomach (nauseous).  · You throw up (vomit).  · You have chills.  · Your pee is not draining into the bag.  · Your catheter gets pulled out.  Summary  · An indwelling urinary catheter is a thin tube that is placed into the bladder to help drain pee (urine) out of the body.  · The catheter is placed into the part of the body that drains pee from the bladder (urethra).  · Taking good care of your catheter will keep it working properly and help prevent problems.  · Always wash your hands before and after touching your catheter or bag.  · Never pull on your catheter or try to take it out.  This information is not intended to replace advice given to you by your health care provider. Make sure you discuss any questions you have with your health care provider.  Document Released: 04/14/2014 Document Revised: 04/10/2020 Document Reviewed: 08/03/2018  [employer] Patient Education © 2020 [employer]

## 2021-06-02 NOTE — DISCHARGE PLANNING
Care Transition Team Discharge Planning    Anticipates discharge disposition:  • Home    Action:  • This RN CM is following the case and the plan is to discharge Pt to home without psychosocial needs.  • Per Urology notes, Pt's KRISTINA drain removed and kruger to stay and Pt to follow up with Urologist next week    Barriers to Discharge:  • None    Plan:  • Will continue to assist Pt with discharge as needed

## 2021-06-02 NOTE — PROGRESS NOTES
Assessment complete.  AA&Ox4.   SpO2 95% on 1L. Denies SOB.  Reporting 1/10 pain. Declines the need for pain medications, pt is resting comfortably.   Abdominal lap sites x 5 with dermabond. CDI.   LLQ KRISTINA drain, dry gauze and tape. CDI.   Tolerating DM diet. Denies N/V.  + void via kruger (CBI).   LBM PTA.   Pt up with SBA.   All needs met at this time. Call light within reach. Pt calls appropriately.

## 2021-06-02 NOTE — PROGRESS NOTES
Note to reader: this note follows the APSO format rather than the historical SOAP format. Assessment and plan located at the top of the note for ease of use.    Chief Complaint  66 y.o. year old male here with BPH s/p RASP 6/1/21    Assessment/Plan  Interval History   BPH s/p RASP 6/1/21    Wean CBI  Will DC this afternoon with kruger and KRISTINA drain if stable      Case discussed with patient, RN and Urology-Dr. Devin MARROQUIN  Patient seen and examined    6/2. POD #1. Episode of clot obstruction this morning otherwise doing well. CBI running. Pain stable. Tolerates diet. KRISTINA with 95 cc/24 hours           Review of Systems  Physical Exam   Review of Systems   Constitutional: Negative for fever.   Gastrointestinal: Positive for abdominal pain.   Genitourinary: Positive for hematuria and urgency.     Vitals:    06/01/21 2037 06/01/21 2333 06/02/21 0412 06/02/21 0800   BP: 121/66 110/61 116/65 113/66   Pulse: 75 70 70 (!) 58   Resp: 18 18 18 18   Temp: 36.4 °C (97.6 °F) 36.6 °C (97.9 °F) 36.5 °C (97.7 °F) 36.8 °C (98.3 °F)   TempSrc: Temporal Temporal Temporal Temporal   SpO2: 93% 95% 96% 95%   Weight:       Height:         Physical Exam  Vitals and nursing note reviewed.   Constitutional:       General: He is not in acute distress.     Appearance: Normal appearance.   Pulmonary:      Effort: Pulmonary effort is normal.   Abdominal:      General: Abdomen is flat. There is no distension.      Palpations: Abdomen is soft.      Tenderness: There is no abdominal tenderness.      Comments: KRISTINA LLQ with sanguinous output   Genitourinary:     Comments: Kruger in place draining light cherry urine with CBI on moderate rate.   Skin:     General: Skin is warm and dry.   Neurological:      Mental Status: He is alert.          Hematology Chemistry   Lab Results   Component Value Date/Time    WBC 11.5 (H) 06/02/2021 05:42 AM    WBC 9.1 08/16/2010 12:00 AM    HEMOGLOBIN 12.6 (L) 06/02/2021 05:42 AM    HEMATOCRIT 40.1 (L) 06/02/2021 05:42 AM     PLATELETCT 243 06/02/2021 05:42 AM     Lab Results   Component Value Date/Time    SODIUM 139 06/02/2021 05:42 AM    POTASSIUM 4.2 06/02/2021 05:42 AM    CHLORIDE 104 06/02/2021 05:42 AM    CO2 27 06/02/2021 05:42 AM    GLUCOSE 150 (H) 06/02/2021 05:42 AM    BUN 20 06/02/2021 05:42 AM    CREATININE 1.30 06/02/2021 05:42 AM    CREATININE 1.07 02/12/2011 10:13 AM    GLOMRATE >59 02/12/2011 10:13 AM         Labs not explicitly included in this progress note were reviewed by the author.   Radiology/imaging not explicitly included in this progress note was reviewed by the author.     Medications reviewed and Labs reviewed

## 2021-06-02 NOTE — CARE PLAN
The patient is Stable - Low risk of patient condition declining or worsening    Shift Goals  Clinical Goals: CBI w/ clear/ pink output  Patient Goals: pain management    Progress made toward(s) clinical / shift goals:  CBI output is red/pink. Pt reports 1/10 pain, medicated per MAR.     Patient is not progressing towards the following goals: NA    Problem: Pain - Standard  Goal: Alleviation of pain or a reduction in pain to the patient’s comfort goal  Outcome: Progressing     Problem: Knowledge Deficit - Standard  Goal: Patient and family/care givers will demonstrate understanding of plan of care, disease process/condition, diagnostic tests and medications  Outcome: Progressing

## 2021-06-02 NOTE — PROGRESS NOTES
Assumed care of patient at 0645. Bedside report received. Assessment complete.  AA&Ox4. Denies CP/SOB.  Reporting 1/10 pain. Medicated per MAR. Declined intervention at this time.   Educated patient regarding pharmacologic and non pharmacologic modalities for pain management.  Skin per flowsheets.  KRISTINA to bulb suction  Tolerating diet. Denies N/V.  + output via CBI.   Last BM PTA  Pt ambulates SBA.  Plan of care discussed, all questions answered. Educated on the importance of calling before getting OOB and pt verbalizes understanding. Educated regarding importance of oral care. Oral care kit at bedside. Call light is within reach, treaded slipper socks on, bed in lowest/ locked position, hourly rounding in place, all needs met at this time

## 2021-06-02 NOTE — PROGRESS NOTES
KRISTINA removed per order. CBI stopped and leg bag placed. Patient educated on how to change leg bag. IV removed. Patient to discharge.

## 2021-06-04 ENCOUNTER — NURSE TRIAGE (OUTPATIENT)
Dept: HEALTH INFORMATION MANAGEMENT | Facility: OTHER | Age: 66
End: 2021-06-04

## 2021-06-04 NOTE — TELEPHONE ENCOUNTER
"LBM 5/31/21; +passing gas; taking stool softeners; advised OTC laxative if feeling constipated.Constipation can perpetuate bladders spasms. Discussed ways to prevent bladders spasms but that it is a normal occurrence. Advised to call urology [place] on call if spasms are intolerable. Advised to increase water intake to keep bladder flushing. Call for increase in bright red blood or large blood blots, lack of drainage, pain.   Message left w/ MA for urologjamia dow regarding symptoms and fact that he has a 3 way catheter w/o a plug which was leaking; he tied it off himself w/ a twist tie.       Answer Assessment - Initial Assessment Questions  1. SYMPTOMS: \"What symptoms are you concerned about?\"        2. ONSET:  \"When did the symptoms start?\"      Since after surgery  3. FEVER: \"Is there a fever?\" If so, ask: \"What is the temperature, how was it measured, and when did it start?\"      no  4. ABDOMINAL PAIN: \"Is there any abdominal pain?\" (e.g., Scale 1-10; or mild, moderate, severe)      Some bladders spasms every hour and a half  5. URINE COLOR: \"What color is the urine?\"  \"Is there blood present in the urine?\" (e.g., clear, yellow, cloudy, tea-colored, blood streaks, bright red)      \"fruit punch color\"  6. ONSET: \"When was the catheter inserted?\"      6/1/21  7. OTHER SYMPTOMS: \"Do you have any other symptoms?\" (e.g., back pain, bad urine odor)       Small blood clots  8. PREGNANCY: \"Is there any chance you are pregnant?\" \"When was your last menstrual period?\"      n/a    Protocols used: URINARY CATHETER SYMPTOMS AND [id number]      "

## 2021-06-08 ENCOUNTER — APPOINTMENT (OUTPATIENT)
Dept: RADIOLOGY | Facility: MEDICAL CENTER | Age: 66
End: 2021-06-08
Attending: EMERGENCY MEDICINE
Payer: MEDICARE

## 2021-06-08 ENCOUNTER — HOSPITAL ENCOUNTER (OUTPATIENT)
Facility: MEDICAL CENTER | Age: 66
End: 2021-06-10
Attending: EMERGENCY MEDICINE | Admitting: STUDENT IN AN ORGANIZED HEALTH CARE EDUCATION/TRAINING PROGRAM
Payer: MEDICARE

## 2021-06-08 DIAGNOSIS — R53.1 GENERALIZED WEAKNESS: ICD-10-CM

## 2021-06-08 DIAGNOSIS — N39.0 ACUTE UTI: ICD-10-CM

## 2021-06-08 DIAGNOSIS — R06.09 DYSPNEA ON EXERTION: ICD-10-CM

## 2021-06-08 DIAGNOSIS — N28.9 RENAL INSUFFICIENCY: ICD-10-CM

## 2021-06-08 PROBLEM — Z90.79 H/O PROSTATECTOMY: Status: ACTIVE | Noted: 2021-06-08

## 2021-06-08 PROBLEM — N17.9 ACUTE KIDNEY INJURY (HCC): Status: ACTIVE | Noted: 2021-06-08

## 2021-06-08 PROBLEM — I10 HYPERTENSION: Status: ACTIVE | Noted: 2021-06-08

## 2021-06-08 LAB
ALBUMIN SERPL BCP-MCNC: 4.1 G/DL (ref 3.2–4.9)
ALBUMIN/GLOB SERPL: 1.3 G/DL
ALP SERPL-CCNC: 66 U/L (ref 30–99)
ALT SERPL-CCNC: 45 U/L (ref 2–50)
AMORPH CRY #/AREA URNS HPF: PRESENT /HPF
ANION GAP SERPL CALC-SCNC: 12 MMOL/L (ref 7–16)
APPEARANCE UR: ABNORMAL
AST SERPL-CCNC: 31 U/L (ref 12–45)
BACTERIA #/AREA URNS HPF: ABNORMAL /HPF
BASOPHILS # BLD AUTO: 0.6 % (ref 0–1.8)
BASOPHILS # BLD: 0.08 K/UL (ref 0–0.12)
BILIRUB SERPL-MCNC: 0.7 MG/DL (ref 0.1–1.5)
BILIRUB UR QL STRIP.AUTO: ABNORMAL
BUN SERPL-MCNC: 23 MG/DL (ref 8–22)
CALCIUM SERPL-MCNC: 9.3 MG/DL (ref 8.5–10.5)
CHLORIDE SERPL-SCNC: 106 MMOL/L (ref 96–112)
CO2 SERPL-SCNC: 21 MMOL/L (ref 20–33)
COLOR UR: ABNORMAL
CREAT SERPL-MCNC: 1.43 MG/DL (ref 0.5–1.4)
EKG IMPRESSION: NORMAL
EOSINOPHIL # BLD AUTO: 0.36 K/UL (ref 0–0.51)
EOSINOPHIL NFR BLD: 2.6 % (ref 0–6.9)
EPI CELLS #/AREA URNS HPF: ABNORMAL /HPF
ERYTHROCYTE [DISTWIDTH] IN BLOOD BY AUTOMATED COUNT: 48.6 FL (ref 35.9–50)
GLOBULIN SER CALC-MCNC: 3.1 G/DL (ref 1.9–3.5)
GLUCOSE BLD-MCNC: 182 MG/DL (ref 65–99)
GLUCOSE SERPL-MCNC: 158 MG/DL (ref 65–99)
GLUCOSE UR STRIP.AUTO-MCNC: NEGATIVE MG/DL
HCT VFR BLD AUTO: 44.2 % (ref 42–52)
HGB BLD-MCNC: 14.2 G/DL (ref 14–18)
IMM GRANULOCYTES # BLD AUTO: 0.07 K/UL (ref 0–0.11)
IMM GRANULOCYTES NFR BLD AUTO: 0.5 % (ref 0–0.9)
KETONES UR STRIP.AUTO-MCNC: NEGATIVE MG/DL
LACTATE BLD-SCNC: 1.3 MMOL/L (ref 0.5–2)
LACTATE BLD-SCNC: 1.6 MMOL/L (ref 0.5–2)
LACTATE BLD-SCNC: 1.8 MMOL/L (ref 0.5–2)
LEUKOCYTE ESTERASE UR QL STRIP.AUTO: NEGATIVE
LYMPHOCYTES # BLD AUTO: 1.75 K/UL (ref 1–4.8)
LYMPHOCYTES NFR BLD: 12.7 % (ref 22–41)
MCH RBC QN AUTO: 29.3 PG (ref 27–33)
MCHC RBC AUTO-ENTMCNC: 32.1 G/DL (ref 33.7–35.3)
MCV RBC AUTO: 91.3 FL (ref 81.4–97.8)
MICRO URNS: ABNORMAL
MONOCYTES # BLD AUTO: 0.86 K/UL (ref 0–0.85)
MONOCYTES NFR BLD AUTO: 6.2 % (ref 0–13.4)
MUCOUS THREADS #/AREA URNS HPF: ABNORMAL /HPF
NEUTROPHILS # BLD AUTO: 10.68 K/UL (ref 1.82–7.42)
NEUTROPHILS NFR BLD: 77.4 % (ref 44–72)
NITRITE UR QL STRIP.AUTO: POSITIVE
NRBC # BLD AUTO: 0 K/UL
NRBC BLD-RTO: 0 /100 WBC
NT-PROBNP SERPL IA-MCNC: 8 PG/ML (ref 0–125)
PH UR STRIP.AUTO: 5 [PH] (ref 5–8)
PLATELET # BLD AUTO: 378 K/UL (ref 164–446)
PMV BLD AUTO: 9.7 FL (ref 9–12.9)
POTASSIUM SERPL-SCNC: 4.1 MMOL/L (ref 3.6–5.5)
PROT SERPL-MCNC: 7.2 G/DL (ref 6–8.2)
PROT UR QL STRIP: 100 MG/DL
RBC # BLD AUTO: 4.84 M/UL (ref 4.7–6.1)
RBC # URNS HPF: >150 /HPF
RBC UR QL AUTO: ABNORMAL
SODIUM SERPL-SCNC: 139 MMOL/L (ref 135–145)
SP GR UR STRIP.AUTO: >=1.03
TROPONIN T SERPL-MCNC: 15 NG/L (ref 6–19)
UROBILINOGEN UR STRIP.AUTO-MCNC: 0.2 MG/DL
WAXY CASTS #/AREA URNS LPF: ABNORMAL /LPF
WBC # BLD AUTO: 13.8 K/UL (ref 4.8–10.8)
WBC #/AREA URNS HPF: ABNORMAL /HPF

## 2021-06-08 PROCEDURE — 87186 SC STD MICRODIL/AGAR DIL: CPT

## 2021-06-08 PROCEDURE — 84484 ASSAY OF TROPONIN QUANT: CPT

## 2021-06-08 PROCEDURE — G0378 HOSPITAL OBSERVATION PER HR: HCPCS

## 2021-06-08 PROCEDURE — 87077 CULTURE AEROBIC IDENTIFY: CPT | Mod: 91

## 2021-06-08 PROCEDURE — 93005 ELECTROCARDIOGRAM TRACING: CPT | Performed by: EMERGENCY MEDICINE

## 2021-06-08 PROCEDURE — 71275 CT ANGIOGRAPHY CHEST: CPT | Mod: ME

## 2021-06-08 PROCEDURE — U0003 INFECTIOUS AGENT DETECTION BY NUCLEIC ACID (DNA OR RNA); SEVERE ACUTE RESPIRATORY SYNDROME CORONAVIRUS 2 (SARS-COV-2) (CORONAVIRUS DISEASE [COVID-19]), AMPLIFIED PROBE TECHNIQUE, MAKING USE OF HIGH THROUGHPUT TECHNOLOGIES AS DESCRIBED BY CMS-2020-01-R: HCPCS

## 2021-06-08 PROCEDURE — 700111 HCHG RX REV CODE 636 W/ 250 OVERRIDE (IP): Performed by: EMERGENCY MEDICINE

## 2021-06-08 PROCEDURE — 83880 ASSAY OF NATRIURETIC PEPTIDE: CPT

## 2021-06-08 PROCEDURE — 96372 THER/PROPH/DIAG INJ SC/IM: CPT | Mod: XU

## 2021-06-08 PROCEDURE — 99285 EMERGENCY DEPT VISIT HI MDM: CPT

## 2021-06-08 PROCEDURE — 83605 ASSAY OF LACTIC ACID: CPT | Mod: 91

## 2021-06-08 PROCEDURE — 700105 HCHG RX REV CODE 258: Performed by: EMERGENCY MEDICINE

## 2021-06-08 PROCEDURE — 81001 URINALYSIS AUTO W/SCOPE: CPT

## 2021-06-08 PROCEDURE — 80053 COMPREHEN METABOLIC PANEL: CPT

## 2021-06-08 PROCEDURE — 96374 THER/PROPH/DIAG INJ IV PUSH: CPT | Mod: XU

## 2021-06-08 PROCEDURE — 87040 BLOOD CULTURE FOR BACTERIA: CPT

## 2021-06-08 PROCEDURE — 87086 URINE CULTURE/COLONY COUNT: CPT

## 2021-06-08 PROCEDURE — 36415 COLL VENOUS BLD VENIPUNCTURE: CPT

## 2021-06-08 PROCEDURE — 85025 COMPLETE CBC W/AUTO DIFF WBC: CPT

## 2021-06-08 PROCEDURE — 700102 HCHG RX REV CODE 250 W/ 637 OVERRIDE(OP): Performed by: STUDENT IN AN ORGANIZED HEALTH CARE EDUCATION/TRAINING PROGRAM

## 2021-06-08 PROCEDURE — A9270 NON-COVERED ITEM OR SERVICE: HCPCS | Performed by: STUDENT IN AN ORGANIZED HEALTH CARE EDUCATION/TRAINING PROGRAM

## 2021-06-08 PROCEDURE — 700117 HCHG RX CONTRAST REV CODE 255: Performed by: EMERGENCY MEDICINE

## 2021-06-08 PROCEDURE — 82962 GLUCOSE BLOOD TEST: CPT

## 2021-06-08 PROCEDURE — 71045 X-RAY EXAM CHEST 1 VIEW: CPT

## 2021-06-08 PROCEDURE — 99220 PR INITIAL OBSERVATION CARE,LEVL III: CPT | Performed by: STUDENT IN AN ORGANIZED HEALTH CARE EDUCATION/TRAINING PROGRAM

## 2021-06-08 PROCEDURE — U0005 INFEC AGEN DETEC AMPLI PROBE: HCPCS

## 2021-06-08 PROCEDURE — 700101 HCHG RX REV CODE 250: Performed by: EMERGENCY MEDICINE

## 2021-06-08 RX ORDER — TADALAFIL 5 MG/1
5 TABLET ORAL EVERY EVENING
Status: DISCONTINUED | OUTPATIENT
Start: 2021-06-08 | End: 2021-06-08

## 2021-06-08 RX ORDER — SODIUM CHLORIDE 9 MG/ML
INJECTION, SOLUTION INTRAVENOUS CONTINUOUS
Status: DISCONTINUED | OUTPATIENT
Start: 2021-06-09 | End: 2021-06-10 | Stop reason: HOSPADM

## 2021-06-08 RX ORDER — SODIUM CHLORIDE 9 MG/ML
1000 INJECTION, SOLUTION INTRAVENOUS ONCE
Status: COMPLETED | OUTPATIENT
Start: 2021-06-08 | End: 2021-06-08

## 2021-06-08 RX ORDER — DEXTROSE MONOHYDRATE 25 G/50ML
50 INJECTION, SOLUTION INTRAVENOUS
Status: DISCONTINUED | OUTPATIENT
Start: 2021-06-08 | End: 2021-06-10 | Stop reason: HOSPADM

## 2021-06-08 RX ORDER — ONDANSETRON 2 MG/ML
4 INJECTION INTRAMUSCULAR; INTRAVENOUS EVERY 4 HOURS PRN
Status: DISCONTINUED | OUTPATIENT
Start: 2021-06-08 | End: 2021-06-10 | Stop reason: HOSPADM

## 2021-06-08 RX ORDER — LISINOPRIL 10 MG/1
10 TABLET ORAL EVERY EVENING
Status: DISCONTINUED | OUTPATIENT
Start: 2021-06-08 | End: 2021-06-10 | Stop reason: HOSPADM

## 2021-06-08 RX ORDER — ROSUVASTATIN CALCIUM 20 MG/1
20 TABLET, COATED ORAL DAILY
Status: DISCONTINUED | OUTPATIENT
Start: 2021-06-08 | End: 2021-06-10 | Stop reason: HOSPADM

## 2021-06-08 RX ORDER — ONDANSETRON 4 MG/1
4 TABLET, ORALLY DISINTEGRATING ORAL EVERY 4 HOURS PRN
Status: DISCONTINUED | OUTPATIENT
Start: 2021-06-08 | End: 2021-06-10 | Stop reason: HOSPADM

## 2021-06-08 RX ADMIN — IOHEXOL 80 ML: 350 INJECTION, SOLUTION INTRAVENOUS at 17:20

## 2021-06-08 RX ADMIN — LISINOPRIL 10 MG: 10 TABLET ORAL at 22:50

## 2021-06-08 RX ADMIN — INSULIN HUMAN 3 UNITS: 100 INJECTION, SOLUTION PARENTERAL at 23:00

## 2021-06-08 RX ADMIN — ROSUVASTATIN CALCIUM 20 MG: 20 TABLET, FILM COATED ORAL at 22:51

## 2021-06-08 RX ADMIN — SODIUM CHLORIDE 1000 ML: 9 INJECTION, SOLUTION INTRAVENOUS at 17:15

## 2021-06-08 RX ADMIN — CEFTRIAXONE SODIUM 2 G: 10 INJECTION, POWDER, FOR SOLUTION INTRAVENOUS at 19:50

## 2021-06-08 RX ADMIN — INSULIN GLARGINE 60 UNITS: 100 INJECTION, SOLUTION SUBCUTANEOUS at 22:52

## 2021-06-08 ASSESSMENT — LIFESTYLE VARIABLES
TOTAL SCORE: 0
AVERAGE NUMBER OF DAYS PER WEEK YOU HAVE A DRINK CONTAINING ALCOHOL: 0
HOW MANY TIMES IN THE PAST YEAR HAVE YOU HAD 5 OR MORE DRINKS IN A DAY: 0
TOTAL SCORE: 0
ALCOHOL_USE: NO
ON A TYPICAL DAY WHEN YOU DRINK ALCOHOL HOW MANY DRINKS DO YOU HAVE: 0
EVER FELT BAD OR GUILTY ABOUT YOUR DRINKING: NO
CONSUMPTION TOTAL: NEGATIVE
EVER HAD A DRINK FIRST THING IN THE MORNING TO STEADY YOUR NERVES TO GET RID OF A HANGOVER: NO
TOTAL SCORE: 0
HAVE PEOPLE ANNOYED YOU BY CRITICIZING YOUR DRINKING: NO
DOES PATIENT WANT TO STOP DRINKING: NO
HAVE YOU EVER FELT YOU SHOULD CUT DOWN ON YOUR DRINKING: NO

## 2021-06-08 ASSESSMENT — PATIENT HEALTH QUESTIONNAIRE - PHQ9
SUM OF ALL RESPONSES TO PHQ9 QUESTIONS 1 AND 2: 0
2. FEELING DOWN, DEPRESSED, IRRITABLE, OR HOPELESS: NOT AT ALL
1. LITTLE INTEREST OR PLEASURE IN DOING THINGS: NOT AT ALL

## 2021-06-08 ASSESSMENT — ENCOUNTER SYMPTOMS
HEADACHES: 0
NECK PAIN: 0
COUGH: 1
CHILLS: 0
BLURRED VISION: 0
FEVER: 0
NAUSEA: 0
DEPRESSION: 0
BRUISES/BLEEDS EASILY: 0
HEMOPTYSIS: 0
MYALGIAS: 0
DOUBLE VISION: 0
HEARTBURN: 0
ABDOMINAL PAIN: 1
DIZZINESS: 0
PALPITATIONS: 0
VOMITING: 0

## 2021-06-08 ASSESSMENT — FIBROSIS 4 INDEX
FIB4 SCORE: 0.81
FIB4 SCORE: 1.16

## 2021-06-08 ASSESSMENT — PAIN DESCRIPTION - PAIN TYPE: TYPE: ACUTE PAIN
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
## 2021-06-08 NOTE — ED TRIAGE NOTES
Chief Complaint   Patient presents with   • Weakness     x1 week   • Shortness of Breath     intermittent x1 week   • Cough     dry, non productive     Patient to triage via wheelchair, patient A&O x4.  Appropriate precautions in place.     Patient reports post operative - prostectomy 06/01/21, [facility].    Explained wait time and triage process to pt. Pt placed back in lobby, told to notify ED tech or triage RN of any changes, verbalized understanding.

## 2021-06-09 ENCOUNTER — APPOINTMENT (OUTPATIENT)
Dept: CARDIOLOGY | Facility: MEDICAL CENTER | Age: 66
End: 2021-06-09
Attending: STUDENT IN AN ORGANIZED HEALTH CARE EDUCATION/TRAINING PROGRAM
Payer: MEDICARE

## 2021-06-09 LAB
ANION GAP SERPL CALC-SCNC: 10 MMOL/L (ref 7–16)
BUN SERPL-MCNC: 18 MG/DL (ref 8–22)
CALCIUM SERPL-MCNC: 8.6 MG/DL (ref 8.5–10.5)
CHLORIDE SERPL-SCNC: 112 MMOL/L (ref 96–112)
CO2 SERPL-SCNC: 21 MMOL/L (ref 20–33)
CREAT SERPL-MCNC: 1.01 MG/DL (ref 0.5–1.4)
GLUCOSE BLD-MCNC: 148 MG/DL (ref 65–99)
GLUCOSE BLD-MCNC: 91 MG/DL (ref 65–99)
GLUCOSE BLD-MCNC: 94 MG/DL (ref 65–99)
GLUCOSE SERPL-MCNC: 98 MG/DL (ref 65–99)
LV EJECT FRACT MOD 2C 99903: 63.38
LV EJECT FRACT MOD 4C 99902: 69.09
LV EJECT FRACT MOD BP 99901: 65.91
POTASSIUM SERPL-SCNC: 3.8 MMOL/L (ref 3.6–5.5)
SARS-COV-2 RNA RESP QL NAA+PROBE: NOTDETECTED
SODIUM SERPL-SCNC: 143 MMOL/L (ref 135–145)
SPECIMEN SOURCE: NORMAL
TROPONIN T SERPL-MCNC: 12 NG/L (ref 6–19)

## 2021-06-09 PROCEDURE — 700102 HCHG RX REV CODE 250 W/ 637 OVERRIDE(OP): Performed by: STUDENT IN AN ORGANIZED HEALTH CARE EDUCATION/TRAINING PROGRAM

## 2021-06-09 PROCEDURE — 700105 HCHG RX REV CODE 258: Performed by: NURSE PRACTITIONER

## 2021-06-09 PROCEDURE — 93306 TTE W/DOPPLER COMPLETE: CPT

## 2021-06-09 PROCEDURE — 96376 TX/PRO/DX INJ SAME DRUG ADON: CPT | Mod: XU

## 2021-06-09 PROCEDURE — A9270 NON-COVERED ITEM OR SERVICE: HCPCS | Performed by: STUDENT IN AN ORGANIZED HEALTH CARE EDUCATION/TRAINING PROGRAM

## 2021-06-09 PROCEDURE — 700105 HCHG RX REV CODE 258: Performed by: STUDENT IN AN ORGANIZED HEALTH CARE EDUCATION/TRAINING PROGRAM

## 2021-06-09 PROCEDURE — 96372 THER/PROPH/DIAG INJ SC/IM: CPT

## 2021-06-09 PROCEDURE — 82962 GLUCOSE BLOOD TEST: CPT | Mod: 91

## 2021-06-09 PROCEDURE — 99226 PR SUBSEQUENT OBSERVATION CARE,LEVEL III: CPT | Performed by: NURSE PRACTITIONER

## 2021-06-09 PROCEDURE — 700101 HCHG RX REV CODE 250: Performed by: STUDENT IN AN ORGANIZED HEALTH CARE EDUCATION/TRAINING PROGRAM

## 2021-06-09 PROCEDURE — 80048 BASIC METABOLIC PNL TOTAL CA: CPT

## 2021-06-09 PROCEDURE — 84484 ASSAY OF TROPONIN QUANT: CPT

## 2021-06-09 PROCEDURE — 51798 US URINE CAPACITY MEASURE: CPT

## 2021-06-09 PROCEDURE — 93306 TTE W/DOPPLER COMPLETE: CPT | Mod: 26 | Performed by: INTERNAL MEDICINE

## 2021-06-09 PROCEDURE — G0378 HOSPITAL OBSERVATION PER HR: HCPCS

## 2021-06-09 PROCEDURE — A9270 NON-COVERED ITEM OR SERVICE: HCPCS | Performed by: HOSPITALIST

## 2021-06-09 PROCEDURE — 700111 HCHG RX REV CODE 636 W/ 250 OVERRIDE (IP): Performed by: STUDENT IN AN ORGANIZED HEALTH CARE EDUCATION/TRAINING PROGRAM

## 2021-06-09 PROCEDURE — 700102 HCHG RX REV CODE 250 W/ 637 OVERRIDE(OP): Performed by: HOSPITALIST

## 2021-06-09 RX ORDER — ACETAMINOPHEN 325 MG/1
650 TABLET ORAL EVERY 6 HOURS PRN
Status: DISCONTINUED | OUTPATIENT
Start: 2021-06-09 | End: 2021-06-10 | Stop reason: HOSPADM

## 2021-06-09 RX ADMIN — SODIUM CHLORIDE: 9 INJECTION, SOLUTION INTRAVENOUS at 20:54

## 2021-06-09 RX ADMIN — Medication 30 MG: at 06:13

## 2021-06-09 RX ADMIN — LISINOPRIL 10 MG: 10 TABLET ORAL at 19:56

## 2021-06-09 RX ADMIN — CEFTRIAXONE SODIUM 1 G: 10 INJECTION, POWDER, FOR SOLUTION INTRAVENOUS at 06:12

## 2021-06-09 RX ADMIN — ACETAMINOPHEN 650 MG: 325 TABLET, FILM COATED ORAL at 20:55

## 2021-06-09 RX ADMIN — ROSUVASTATIN CALCIUM 20 MG: 20 TABLET, FILM COATED ORAL at 19:56

## 2021-06-09 RX ADMIN — INSULIN GLARGINE 60 UNITS: 100 INJECTION, SOLUTION SUBCUTANEOUS at 19:58

## 2021-06-09 RX ADMIN — SODIUM CHLORIDE: 9 INJECTION, SOLUTION INTRAVENOUS at 00:54

## 2021-06-09 ASSESSMENT — ENCOUNTER SYMPTOMS
PND: 0
DIZZINESS: 0
SHORTNESS OF BREATH: 1
WEIGHT LOSS: 0
DOUBLE VISION: 0
POLYDIPSIA: 0
DIARRHEA: 0
PALPITATIONS: 0
WHEEZING: 0
EYE REDNESS: 0
INSOMNIA: 0
FALLS: 0
NERVOUS/ANXIOUS: 0
SENSORY CHANGE: 0
BLOOD IN STOOL: 0
SINUS PAIN: 0
LOSS OF CONSCIOUSNESS: 0
PHOTOPHOBIA: 0
TREMORS: 0
VOMITING: 0
DEPRESSION: 0
NECK PAIN: 0
ABDOMINAL PAIN: 1
SPUTUM PRODUCTION: 0
COUGH: 1
SPEECH CHANGE: 0
ORTHOPNEA: 0
FLANK PAIN: 0
BLURRED VISION: 0
EYE PAIN: 0
FEVER: 0
BACK PAIN: 0
CONSTIPATION: 0
HEADACHES: 0
SORE THROAT: 0
SEIZURES: 0
NAUSEA: 0
HEMOPTYSIS: 0
FOCAL WEAKNESS: 0
MYALGIAS: 0
MEMORY LOSS: 0
TINGLING: 0
CHILLS: 0

## 2021-06-09 ASSESSMENT — PAIN DESCRIPTION - PAIN TYPE
TYPE: ACUTE PAIN

## 2021-06-09 ASSESSMENT — LIFESTYLE VARIABLES: SUBSTANCE_ABUSE: 0

## 2021-06-09 NOTE — PROGRESS NOTES
Bedside report received [id number]. POC discussed with pt; NP at bedside; Pt denies respiratory symptoms; Awaiting Uro input for POC; Pt voiding without difficulty; Urine dark tea colored, no clots present; Lap stabs derma bond FADY; Pt denies pain; all questions answered at this time.

## 2021-06-09 NOTE — PROGRESS NOTES
Pt voiding without difficulties or complaints; Instructed pt to please inform RN when voiding is complete for PVR scan; verbalized understanding
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
## 2021-06-09 NOTE — ASSESSMENT & PLAN NOTE
Resolved after IV Fluids.  - continue Serial bladder scan's - Urology, Dr. Abbasi, consulted by ERP if patient has more than 500 cc's he wants an 18 Indonesian coude catheter placed.   - Avoid nephrotoxins  - Renally dose medications.    - monitor

## 2021-06-09 NOTE — ED NOTES
Pt medicated per ERP orders, covid test collected and sent to lab.  pt to bathroom without needing assistance. Call light within reach, no distress noted.

## 2021-06-09 NOTE — H&P
Hospital Medicine History & Physical Note    Date of Service  6/8/2021    Primary Care Physician  ZARIA Perea.    Consultants  Urology -Dr. Abbasi  Code Status  Full Code    Chief Complaint  Chief Complaint   Patient presents with   • Weakness     x1 week   • Shortness of Breath     intermittent x1 week   • Cough     dry, non productive       History of Presenting Illness  66 y.o. male with past medical history of hypertension, diabetes, recent robotic assisted prostatectomy on 6/1/2021 for severe BPH and bladder outlet obstruction by Dr. Aguirre, urology who presented 6/8/2021 as a referral from Dr. Aguirre's office to rule out pulmonary embolism.  Today he had a follow-up appointment with Dr. Aguirre and he patient explained his symptoms and was advised to visit ER.  His Martinez catheter was removed today.  Patient states that he has been having painful bladder spasms since being discharged associated with episodes of hyperventilation, painful urination and feeling unwell.  He also reports a dry cough for the last 3 days.  Patient reports he had pneumonia in the past and has some scarring tissue and figured it was because of that.  No relieving or exacerbating factors were noted.  He denies any chest pain, nausea, vomiting and reports mild abdominal pain from laparoscopic incisions for his prostatectomy.  In the ER, UA was positive for urinary tract infection treated with Rocephin, given 1 L of normal saline and urology was consulted, Dr. Abbasi who recommended serial bladder scans and if bladder scan greater than 500 to reinsert Martinez catheter.  He also had intermittent episodes of hypoxia occasionally dropping to 88-89%.  Patient was examined bedside is currently alert, awake, oriented x4 not in any respiratory distress saturating 94% on room air.  CT pulmonary angiogram done subsequently was negative for pulmonary embolism.  Hospital medicine called for further evaluation and  Pt alert and oriented x4. VS: afebrile. Tele: SB. Pt denies N/V/D. Pt denies pain. Received pt on vapotherm 30L 70% FiO2 sating at 95%. Pt was weaned down by RT to 6L HFNC. Pt is currently maintaining O2 sats at 95% on 6L HFNC. IV decadron. IV remdesivir. control medications as ordered  - Initiate emergency measures for life threatening arrhythmias  - Monitor electrolytes and administer replacement therapy as ordered  Outcome: Progressing     Problem: RESPIRATORY - ADULT  Goal: Achieves optimal ventilation management.        Review of Systems  Review of Systems   Constitutional: Positive for malaise/fatigue. Negative for chills and fever.   HENT: Negative for hearing loss and tinnitus.    Eyes: Negative for blurred vision and double vision.   Respiratory: Positive for cough. Negative for hemoptysis.    Cardiovascular: Negative for chest pain and palpitations.   Gastrointestinal: Positive for abdominal pain. Negative for heartburn, nausea and vomiting.   Genitourinary: Positive for dysuria, frequency and urgency.   Musculoskeletal: Negative for myalgias and neck pain.   Skin: Negative for rash.   Neurological: Negative for dizziness and headaches.   Endo/Heme/Allergies: Does not bruise/bleed easily.   Psychiatric/Behavioral: Negative for depression and suicidal ideas.   All other systems reviewed and are negative.      Past Medical History   has a past medical history of Arrhythmia (05/13/2021), Arthritis (05/13/2021), BPH (benign prostatic hyperplasia) (3/11/2010), Bronchitis, Bulging lumbar disc, Cataract (05/13/2021), DDD (degenerative disc disease), cervical, Depression (11/4/2009), Diabetes, DIABETES MELLITUS (11/4/2009), Elevated liver enzymes (3/11/2010), Hyperlipidemia (11/4/2009), Hypertension, Lumbosacral disc disease, Pneumonia, Psychiatric disorder, Scarring of lung, and Torn meniscus (05/13/2021).    Surgical History   has a past surgical history that includes tonsillectomy; rotator cuff repair; eye surgery; cholecystectomy; biceps tendon repair; and prostatectomy, simple, robot-assisted, using da mana xi (6/1/2021).     Family History  family history includes Cancer in his mother; Diabetes in his father.     Social History   reports that he has never smoked. He has never used smokeless tobacco. He reports current alcohol use. He reports that he does not use drugs.    Allergies  Allergies   Allergen Reactions   • Sulfa Drugs Rash     Red man syndrome      • Septra [Bactrim Ds] Rash     Per pt. Doesn't  patient on fluid and nutrition restrictions as appropriate  Outcome: Progressing remember          Medications  Prior to Admission Medications   Prescriptions Last Dose Informant Patient Reported? Taking?   B-D UF III MINI PEN NEEDLES 31G X 5 MM Misc   No No   Sig: USE 1 PENNEEDLE DAILY   Blood Glucose Monitoring Suppl Misc   No No   Sig: Use  TID  with symptoms of high/low blood sugar.   CINNAMON PO  Patient Yes No   Sig: Take 1 tablet by mouth 2 times a day.   Cholecalciferol (VITAMIN D3 PO)  Patient Yes No   Sig: Take 1 tablet by mouth every day.   Coenzyme Q10 (CO Q 10 PO)  Patient Yes No   Sig: Take 1 tablet by mouth every day.   GLUCOSAMINE HCL PO  Patient Yes No   Sig: Take 1 capsule by mouth 2 times a day.   Menthol, Topical Analgesic, (BIOFREEZE EX)  Patient Yes No   Sig: Apply 1 Application topically every day. Both knees   Multiple Vitamin (MULTI-VITAMIN DAILY PO)  Patient Yes No   Sig: Take 1 tablet by mouth every day.   VITAMIN K PO  Patient Yes No   Sig: Take 1 tablet by mouth every day.   glipiZIDE (GLUCOTROL) 10 MG Tab  Patient No No   Sig: TAKE 1 TABLET BY MOUTH TWO  TIMES DAILY   Patient taking differently: Take 10 mg by mouth 2 times a day.   insulin glargine (LANTUS SOLOSTAR) 100 UNIT/ML Solution Pen-injector injection  Patient No No   Sig: Inject 60 units sub q every evening as directed.   lisinopril (PRINIVIL) 10 MG Tab  Patient No No   Sig: TAKE 1 TABLET BY MOUTH  EVERY DAY   Patient taking differently: Take 10 mg by mouth every evening.   metformin (GLUCOPHAGE) 1000 MG tablet  Patient No No   Sig: TAKE 1 TABLET BY MOUTH TWO  TIMES DAILY WITH MEALS   Patient taking differently: Take 1,000 mg by mouth 2 times a day with meals. TAKE 1 TABLET BY MOUTH TWO  TIMES DAILY WITH MEALS   rosuvastatin (CRESTOR) 20 MG Tab  Patient No No   Sig: TAKE 1 TABLET BY MOUTH IN  THE EVENING   Patient taking differently: Take 20 mg by mouth every day.   tadalafil (CIALIS) 5 MG tablet  Patient Yes No   Sig: Take 5 mg by mouth every evening.      Facility-Administered Medications: None        Physical Exam  Temp:  [36.4 °C (97.6 °F)] 36.4 °C (97.6 °F)  Pulse:  [65-77] 72  Resp:  [14-18] 16  BP: (101-110)/(57-74) 110/57  SpO2:  [89 %-98 %] 94 %    Physical Exam  Vitals and nursing note reviewed.   Constitutional:       Appearance: He is obese.   HENT:      Head: Normocephalic and atraumatic.      Right Ear: Tympanic membrane normal.      Left Ear: Tympanic membrane normal.      Nose: Nose normal.      Mouth/Throat:      Pharynx: Oropharynx is clear. No oropharyngeal exudate or posterior oropharyngeal erythema.   Eyes:      General:         Right eye: No discharge.         Left eye: No discharge.      Extraocular Movements: Extraocular movements intact.      Pupils: Pupils are equal, round, and reactive to light.   Cardiovascular:      Rate and Rhythm: Normal rate and regular rhythm.      Pulses: Normal pulses.      Heart sounds: Normal heart sounds. No murmur heard.   No friction rub.   Pulmonary:      Effort: Pulmonary effort is normal. No respiratory distress.      Breath sounds: Normal breath sounds. No stridor. No wheezing or rhonchi.   Abdominal:      General: Abdomen is flat. There is no distension.      Palpations: Abdomen is soft. There is no mass.      Tenderness: There is no abdominal tenderness.      Hernia: No hernia is present.      Comments: Multiple healed incisions in abdomen   Musculoskeletal:         General: No swelling, tenderness, deformity or signs of injury. Normal range of motion.      Cervical back: Neck supple. No rigidity or tenderness.   Skin:     General: Skin is warm and dry.      Capillary Refill: Capillary refill takes less than 2 seconds.      Coloration: Skin is not jaundiced or pale.      Findings: No bruising or erythema.   Neurological:      General: No focal deficit present.      Mental Status: He is alert and oriented to person, place, and time.      Cranial Nerves: No cranial nerve deficit.      Sensory: No sensory deficit.      Motor: No weakness.       Coordination: Coordination normal.   Psychiatric:         Mood and Affect: Mood normal.         Behavior: Behavior normal.         Laboratory:  Recent Labs     21  1530   WBC 13.8*   RBC 4.84   HEMOGLOBIN 14.2   HEMATOCRIT 44.2   MCV 91.3   MCH 29.3   MCHC 32.1*   RDW 48.6   PLATELETCT 378   MPV 9.7     Recent Labs     21  1530   SODIUM 139   POTASSIUM 4.1   CHLORIDE 106   CO2 21   GLUCOSE 158*   BUN 23*   CREATININE 1.43*   CALCIUM 9.3     Recent Labs     21  1530   ALTSGPT 45   ASTSGOT 31   ALKPHOSPHAT 66   TBILIRUBIN 0.7   GLUCOSE 158*         Recent Labs     21  1530   NTPROBNP 8         Recent Labs     21  1530   TROPONINT 15       Imaging:  CT-CTA CHEST PULMONARY ARTERY W/ RECONS   Final Result      1.  No CT evidence of pulmonary embolism.      2.  No significant incidental abnormalities are identified.            DX-CHEST-PORTABLE (1 VIEW)   Final Result         No acute cardiac or pulmonary abnormality is identified.        Results for orders placed or performed during the hospital encounter of 21   EKG (NOW)   Result Value Ref Range    Report       [facility] Emergency Dept.    Test Date:  2021  Pt Name:    JOHN REYES                   Department: ER  MRN:        [id number]                      Room:  Gender:     Male                         Technician: [id number]  :        1955                   Requested By:ER TRIAGE PROTOCOL  Order #:    [id number]                    Reading MD: Eleanor Linder    Measurements  Intervals                                Axis  Rate:       80                           P:          22  HI:         160                          QRS:        -50  QRSD:       92                           T:          1  QT:         376  QTc:        434    Interpretive Statements  SINUS RHYTHM rate 80  LAD  T waves inverted III  Poor R waves progression  No ST elevation or depression  LEFT ANTERIOR FASCICULAR BLOCK  LATE PRECORDIAL R/S  TRANSITION  Compared to ECG 05/13/2021 15:32:52  Left anterior fascicular block now present  Electronically Signed On 6-8-2021 16:49:26 PDT by Eleanor Linder           Assessment/Plan:  I anticipate this patient is appropriate for observation status at this time.    * Urinary tract infection  Assessment & Plan  Blood cultures x 2   IVF 1 liter received   Continue with IV Rocephin   F/u with urology     Dyspnea on exertion  Assessment & Plan  CTA negative for Pulmonary Embolism. EKG showing sinus rythum rate 80 with left anterior fasicular block.   Echocardiogram transthoracic.    Acute kidney injury (HCC)  Assessment & Plan  Trial of IVF re-check BMP  Serial bladder scan's - Urology, Dr. Abbasi, consulted by ERP if patient has more than 500 cc's he wants an 18 Swedish coude catheter placed.   Avoid nephrotoxins  Renally adjust all medications.     Hypertension  Assessment & Plan  Continue with lisinopril 10 mg nightly     H/O prostatectomy  Assessment & Plan  History of robotic assisted prostatectomy on 6/1/2021 for severe BPH and bladder outlet obstruction by Dr. Aguirre on 6/1/2021.     Obesity (BMI 30-39.9)- (present on admission)  Assessment & Plan     Carb consistent 2 gram sodium diet     Hyperlipidemia- (present on admission)  Assessment & Plan  Continue with rosuvastatin 20 mg daily     DM type 2 (diabetes mellitus, type 2) ([facility])- (present on admission)  Assessment & Plan  Hold home oral diabetic medications metformin, glipizide while in hospital.   Resume Lantus 60 units nightly + ISS + Frequent accu-checks and hypoglycemia protocol.

## 2021-06-09 NOTE — CARE PLAN
The patient is Stable - Low risk of patient condition declining or worsening         Progress made toward(s) clinical / shift goals:     Problem: Pain - Standard  Goal: Alleviation of pain or a reduction in pain to the patient’s comfort goal  Outcome: Progressing     Problem: Knowledge Deficit - Standard  Goal: Patient and family/care givers will demonstrate understanding of plan of care, disease process/condition, diagnostic tests and medications  Outcome: Progressing     Problem: Urinary Elimination  Goal: Establish and maintain regular urinary output  Outcome: Progressing       Patient is not progressing towards the following goals:

## 2021-06-09 NOTE — PROGRESS NOTES
4 Eyes Skin Assessment Completed by ANABELL Duong and ANABELL Mead.    Head WDL  Ears WDL  Nose WDL  Mouth WDL  Neck WDL  Breast/Chest WDL  Shoulder Blades WDL  Spine WDL  (R) Arm/Elbow/Hand WDL  (L) Arm/Elbow/Hand WDL  Abdomen Incision (6 laparoscopic incision sites with dermabond)  Groin WDL  Scrotum/Coccyx/Buttocks WDL  (R) Leg WDL  (L) Leg WDL  (R) Heel/Foot/Toe WDL  (L) Heel/Foot/Toe WDL          Devices In Places Pulse Ox      Interventions In Place N/A    Possible Skin Injury No    Pictures Uploaded Into Epic N/A  Wound Consult Placed N/A  RN Wound Prevention Protocol Ordered No

## 2021-06-09 NOTE — PROGRESS NOTES
Received ER report and assumed care of patient. Patient is A&Ox4 and awake in bed. Patient showing no signs of distress, no complaints of pain, and is on RA. Call light within reach, bed in low position, 2 side rails up, and belongings are within reach. Patient was updated on Plan of Care. Pt educated on use of call light for assistance.

## 2021-06-09 NOTE — ASSESSMENT & PLAN NOTE
- Hold metformin, glipizide while in hospital.   - Resume Lantus 60 units nightly  - accucheck ac/hs with ssi coverage

## 2021-06-09 NOTE — ASSESSMENT & PLAN NOTE
History of robotic assisted prostatectomy on 6/1/2021 for severe BPH and bladder outlet obstruction by Dr. Aguirre on 6/1/2021. Post void residuals so far unremarkable. Continues to have hematuria  - continue bladder scans post void   - urology on consult

## 2021-06-09 NOTE — PROGRESS NOTES
Hospital Medicine Daily Progress Note    Date of Service  6/9/2021    Chief Complaint  Shortness of breath, hematuria    Hospital Course  This is a pleasant 66 y.o. male with pmh of HTN, DM, recent robotic assisted prostatectomy on 6/1/2021 for severe BPH and bladder outlet obstruction by Dr. Aguirre, urology. The patient presented 6/8/2021 as a referral from Dr. Aguirre's office to rule out pulmonary embolism due to patient having complaints of shortness of breath in light of recent susrgery. His Kruger catheter was removed today at the urology clinic.  Patient states that he has been having painful bladder spasms since being discharged associated with episodes of hyperventilation, painful urination and feeling unwell. He also reports a dry cough for the last 3 days. He denies any chest pain, nausea, vomiting and reports mild abdominal pain from laparoscopic incisions for his prostatectomy.    In the ER, UA was positive for urinary tract infection and started on Rocephin. Urologist, Dr Abbasi, was consulted, who recommended serial bladder scans with reinsertion of Kruger catheter if bladder scan greater than 500.    The patient was also noted to have intermittent episodes of hypoxia occasionally dropping to 88-89%; however, did not require supplemental oxygen, as the hypoxia was short lived each time. CT PE was negative for PE and negative for acute findings.     Interval Problem Update  6/9: Patient seen and examined at bedside. He continues to have frequency, urgency and lower abdominal discomfort, as well as some hematuria. He says his cough is resolved and is not currently short of breath, has not required oxygen. He says he was feeling ill while in the waiting room at the urology clinic, but feels quite a bit better at this point since starting on abx    PLAN:  Continue IV Rocephin and IV fluids  Serial bladder scans, reinsert kruger if > 500 ml  Urology on consult    Consultants/Specialty  Urologist,   [place]    Code Status  Full Code    Disposition  DC to home in am if stable    Review of Systems  Review of Systems   Constitutional: Positive for malaise/fatigue. Negative for chills, fever and weight loss.   HENT: Negative for congestion, ear discharge, ear pain, hearing loss, sinus pain, sore throat and tinnitus.    Eyes: Negative for blurred vision, double vision, photophobia, pain and redness.   Respiratory: Positive for cough and shortness of breath. Negative for hemoptysis, sputum production and wheezing.    Cardiovascular: Negative for chest pain, palpitations, orthopnea, leg swelling and PND.   Gastrointestinal: Positive for abdominal pain (  lower abdominal pain). Negative for blood in stool, constipation, diarrhea, nausea and vomiting.   Genitourinary: Positive for dysuria, frequency, hematuria and urgency. Negative for flank pain.   Musculoskeletal: Negative for back pain, falls, joint pain, myalgias and neck pain.   Skin: Negative for itching and rash.   Neurological: Negative for dizziness, tingling, tremors, sensory change, speech change, focal weakness, seizures, loss of consciousness and headaches.   Endo/Heme/Allergies: Negative for polydipsia.   Psychiatric/Behavioral: Negative for depression, memory loss, substance abuse and suicidal ideas. The patient is not nervous/anxious and does not have insomnia.         Physical Exam  Temp:  [36 °C (96.8 °F)-36.9 °C (98.4 °F)] 36.2 °C (97.2 °F)  Pulse:  [64-73] 68  Resp:  [18-19] 18  BP: (116-149)/(55-72) 127/71  SpO2:  [88 %-94 %] 91 %    Physical Exam  Constitutional:       Appearance: He is ill-appearing.   HENT:      Head: Normocephalic and atraumatic.      Mouth/Throat:      Mouth: Mucous membranes are moist.   Cardiovascular:      Rate and Rhythm: Normal rate and regular rhythm.      Heart sounds: No murmur heard.     Pulmonary:      Effort: Pulmonary effort is normal. No respiratory distress.      Breath sounds: No wheezing, rhonchi or rales.    Abdominal:      General: Distension:   lower abdominal tenderness.      Palpations: Abdomen is soft.      Tenderness: There is abdominal tenderness.   Musculoskeletal:      Comments: Moves all extremities   Skin:     General: Skin is warm and dry.   Neurological:      Mental Status: He is alert and oriented to person, place, and time. Mental status is at baseline.   Psychiatric:      Comments: Calm, cooperative during exam         Fluids    Intake/Output Summary (Last 24 hours) at 6/9/2021 1633  Last data filed at 6/8/2021 2103  Gross per 24 hour   Intake 1000 ml   Output --   Net 1000 ml       Laboratory  Recent Labs     06/08/21  1530   WBC 13.8*   RBC 4.84   HEMOGLOBIN 14.2   HEMATOCRIT 44.2   MCV 91.3   MCH 29.3   MCHC 32.1*   RDW 48.6   PLATELETCT 378   MPV 9.7     Recent Labs     06/08/21  1530 06/09/21  0625   SODIUM 139 143   POTASSIUM 4.1 3.8   CHLORIDE 106 112   CO2 21 21   GLUCOSE 158* 98   BUN 23* 18   CREATININE 1.43* 1.01   CALCIUM 9.3 8.6                   Imaging  EC-ECHOCARDIOGRAM COMPLETE W/O CONT         CT-CTA CHEST PULMONARY ARTERY W/ RECONS   Final Result      1.  No CT evidence of pulmonary embolism.      2.  No significant incidental abnormalities are identified.            DX-CHEST-PORTABLE (1 VIEW)   Final Result         No acute cardiac or pulmonary abnormality is identified.           Assessment/Plan  * Urinary tract infection  Assessment & Plan  Continues to have urgency, frequency, lower abdominal discomfort  - f/u Blood cultures    - continue IV fluids  - Continue with IV Rocephin     Dyspnea on exertion  Assessment & Plan  Hypoxic intermittently at 88%, not requiring oxygen as this is transient. CTA negative for Pulmonary Embolism or other acute findings. EKG showing sinus rythum rate 80 with left anterior fasicular block.   - f/u echocardiogram   - supplemental oxygen prn    Acute kidney injury (HCC)  Assessment & Plan  Resolved after IV Fluids.  - continue Serial bladder scan's -  Urology, Dr. Abbasi, consulted by ERP if patient has more than 500 cc's he wants an 18 Burkinan coude catheter placed.   - Avoid nephrotoxins  - Renally dose medications.    - monitor    Hypertension  Assessment & Plan  Continue with lisinopril 10 mg nightly     Obesity (BMI 30-39.9)- (present on admission)  Assessment & Plan     Carb consistent 2 gram sodium diet     H/O prostatectomy  Assessment & Plan  History of robotic assisted prostatectomy on 6/1/2021 for severe BPH and bladder outlet obstruction by Dr. Aguirre on 6/1/2021.    - urology on consult    Hyperlipidemia- (present on admission)  Assessment & Plan  - Continue with rosuvastatin 20 mg daily     DM type 2 (diabetes mellitus, type 2) (HCC)- (present on admission)  Assessment & Plan  - Hold metformin, glipizide while in hospital.   - Resume Lantus 60 units nightly  - accucheck ac/hs with ssi coverage        VTE prophylaxis: SCDs

## 2021-06-09 NOTE — ASSESSMENT & PLAN NOTE
Was intermittently hypoxic at 88%, not requiring oxygen; however, this is now resolved. CTA negative for Pulmonary Embolism or other acute findings. EKG showing sinus rythum rate 80 with left anterior fasicular block. Echocardiogram unremarkable. EF 65%   - supplemental oxygen prn

## 2021-06-09 NOTE — ASSESSMENT & PLAN NOTE
Continues to have urgency, frequency, lower abdominal discomfort, hematuria. Urine culture positive for Klebsiella pneumoniae, susceptibilities pending. BC so far negative  - f/u Blood cultures   - f/u susceptibilities   - continue IV fluids  - Continue with IV Rocephin

## 2021-06-10 VITALS
WEIGHT: 234.79 LBS | OXYGEN SATURATION: 93 % | RESPIRATION RATE: 16 BRPM | BODY MASS INDEX: 32.87 KG/M2 | HEIGHT: 71 IN | DIASTOLIC BLOOD PRESSURE: 78 MMHG | TEMPERATURE: 97.8 F | SYSTOLIC BLOOD PRESSURE: 151 MMHG | HEART RATE: 74 BPM

## 2021-06-10 PROBLEM — R06.09 DYSPNEA ON EXERTION: Status: RESOLVED | Noted: 2021-06-08 | Resolved: 2021-06-10

## 2021-06-10 PROBLEM — N17.9 ACUTE KIDNEY INJURY (HCC): Status: RESOLVED | Noted: 2021-06-08 | Resolved: 2021-06-10

## 2021-06-10 LAB
ANION GAP SERPL CALC-SCNC: 11 MMOL/L (ref 7–16)
BACTERIA UR CULT: ABNORMAL
BACTERIA UR CULT: ABNORMAL
BASOPHILS # BLD AUTO: 0.8 % (ref 0–1.8)
BASOPHILS # BLD: 0.09 K/UL (ref 0–0.12)
BUN SERPL-MCNC: 15 MG/DL (ref 8–22)
CALCIUM SERPL-MCNC: 8.6 MG/DL (ref 8.5–10.5)
CHLORIDE SERPL-SCNC: 107 MMOL/L (ref 96–112)
CO2 SERPL-SCNC: 20 MMOL/L (ref 20–33)
CREAT SERPL-MCNC: 1.01 MG/DL (ref 0.5–1.4)
EOSINOPHIL # BLD AUTO: 0.61 K/UL (ref 0–0.51)
EOSINOPHIL NFR BLD: 5.7 % (ref 0–6.9)
ERYTHROCYTE [DISTWIDTH] IN BLOOD BY AUTOMATED COUNT: 49.1 FL (ref 35.9–50)
GLUCOSE BLD-MCNC: 129 MG/DL (ref 65–99)
GLUCOSE BLD-MCNC: 137 MG/DL (ref 65–99)
GLUCOSE BLD-MCNC: 142 MG/DL (ref 65–99)
GLUCOSE SERPL-MCNC: 143 MG/DL (ref 65–99)
HCT VFR BLD AUTO: 38.6 % (ref 42–52)
HGB BLD-MCNC: 12.7 G/DL (ref 14–18)
IMM GRANULOCYTES # BLD AUTO: 0.05 K/UL (ref 0–0.11)
IMM GRANULOCYTES NFR BLD AUTO: 0.5 % (ref 0–0.9)
LYMPHOCYTES # BLD AUTO: 2.34 K/UL (ref 1–4.8)
LYMPHOCYTES NFR BLD: 22 % (ref 22–41)
MCH RBC QN AUTO: 30.2 PG (ref 27–33)
MCHC RBC AUTO-ENTMCNC: 32.9 G/DL (ref 33.7–35.3)
MCV RBC AUTO: 91.7 FL (ref 81.4–97.8)
MONOCYTES # BLD AUTO: 0.7 K/UL (ref 0–0.85)
MONOCYTES NFR BLD AUTO: 6.6 % (ref 0–13.4)
NEUTROPHILS # BLD AUTO: 6.87 K/UL (ref 1.82–7.42)
NEUTROPHILS NFR BLD: 64.4 % (ref 44–72)
NRBC # BLD AUTO: 0 K/UL
NRBC BLD-RTO: 0 /100 WBC
PLATELET # BLD AUTO: 325 K/UL (ref 164–446)
PMV BLD AUTO: 9.5 FL (ref 9–12.9)
POTASSIUM SERPL-SCNC: 3.8 MMOL/L (ref 3.6–5.5)
RBC # BLD AUTO: 4.21 M/UL (ref 4.7–6.1)
SIGNIFICANT IND 70042: ABNORMAL
SITE SITE: ABNORMAL
SODIUM SERPL-SCNC: 138 MMOL/L (ref 135–145)
SOURCE SOURCE: ABNORMAL
WBC # BLD AUTO: 10.7 K/UL (ref 4.8–10.8)

## 2021-06-10 PROCEDURE — 96372 THER/PROPH/DIAG INJ SC/IM: CPT

## 2021-06-10 PROCEDURE — 82962 GLUCOSE BLOOD TEST: CPT

## 2021-06-10 PROCEDURE — 51798 US URINE CAPACITY MEASURE: CPT

## 2021-06-10 PROCEDURE — A9270 NON-COVERED ITEM OR SERVICE: HCPCS | Performed by: NURSE PRACTITIONER

## 2021-06-10 PROCEDURE — 700102 HCHG RX REV CODE 250 W/ 637 OVERRIDE(OP): Performed by: NURSE PRACTITIONER

## 2021-06-10 PROCEDURE — 80048 BASIC METABOLIC PNL TOTAL CA: CPT

## 2021-06-10 PROCEDURE — G0378 HOSPITAL OBSERVATION PER HR: HCPCS

## 2021-06-10 PROCEDURE — 99217 PR OBSERVATION CARE DISCHARGE: CPT | Performed by: INTERNAL MEDICINE

## 2021-06-10 PROCEDURE — 96376 TX/PRO/DX INJ SAME DRUG ADON: CPT

## 2021-06-10 PROCEDURE — 700111 HCHG RX REV CODE 636 W/ 250 OVERRIDE (IP): Performed by: STUDENT IN AN ORGANIZED HEALTH CARE EDUCATION/TRAINING PROGRAM

## 2021-06-10 PROCEDURE — A9270 NON-COVERED ITEM OR SERVICE: HCPCS | Performed by: STUDENT IN AN ORGANIZED HEALTH CARE EDUCATION/TRAINING PROGRAM

## 2021-06-10 PROCEDURE — 700102 HCHG RX REV CODE 250 W/ 637 OVERRIDE(OP): Performed by: STUDENT IN AN ORGANIZED HEALTH CARE EDUCATION/TRAINING PROGRAM

## 2021-06-10 PROCEDURE — 85025 COMPLETE CBC W/AUTO DIFF WBC: CPT

## 2021-06-10 RX ORDER — CEFDINIR 300 MG/1
300 CAPSULE ORAL 2 TIMES DAILY
Qty: 8 CAPSULE | Refills: 0 | Status: SHIPPED | OUTPATIENT
Start: 2021-06-11 | End: 2021-06-15

## 2021-06-10 RX ADMIN — CEFTRIAXONE SODIUM 1 G: 10 INJECTION, POWDER, FOR SOLUTION INTRAVENOUS at 06:03

## 2021-06-10 RX ADMIN — ENOXAPARIN SODIUM 40 MG: 40 INJECTION SUBCUTANEOUS at 06:03

## 2021-06-10 RX ADMIN — MAGNESIUM HYDROXIDE 30 ML: 400 SUSPENSION ORAL at 13:35

## 2021-06-10 RX ADMIN — Medication 30 MG: at 06:02

## 2021-06-10 ASSESSMENT — ENCOUNTER SYMPTOMS
WHEEZING: 0
FOCAL WEAKNESS: 0
NAUSEA: 0
BLOOD IN STOOL: 0
MEMORY LOSS: 0
CONSTIPATION: 1
BLURRED VISION: 0
HEMOPTYSIS: 0
FALLS: 0
PALPITATIONS: 0
ABDOMINAL PAIN: 0
HEADACHES: 0
SENSORY CHANGE: 0
TINGLING: 0
PND: 0
DEPRESSION: 0
FEVER: 0
LOSS OF CONSCIOUSNESS: 0
POLYDIPSIA: 0
INSOMNIA: 0
CHILLS: 0
DOUBLE VISION: 0
PHOTOPHOBIA: 0
DIZZINESS: 0
SEIZURES: 0
DIARRHEA: 0
NECK PAIN: 0
VOMITING: 0
FLANK PAIN: 0
WEIGHT LOSS: 0
SPUTUM PRODUCTION: 0
MYALGIAS: 0
SINUS PAIN: 0
TREMORS: 0
ORTHOPNEA: 0
SORE THROAT: 0
BACK PAIN: 0
EYE PAIN: 0
NERVOUS/ANXIOUS: 0
EYE REDNESS: 0
SPEECH CHANGE: 0

## 2021-06-10 ASSESSMENT — LIFESTYLE VARIABLES: SUBSTANCE_ABUSE: 0

## 2021-06-10 ASSESSMENT — PAIN DESCRIPTION - PAIN TYPE: TYPE: ACUTE PAIN

## 2021-06-10 NOTE — PROGRESS NOTES
Hospital Medicine Daily Progress Note    Date of Service  6/10/2021    Chief Complaint  Shortness of breath, hematuria    Hospital Course  This is a pleasant 66 y.o. male with pmh of HTN, DM, recent robotic assisted prostatectomy on 6/1/2021 for severe BPH and bladder outlet obstruction by Dr. Aguirre, urology. The patient presented 6/8/2021 as a referral from Dr. Aguirre's office to rule out pulmonary embolism due to patient having complaints of shortness of breath in light of recent susrgery. His Martinez catheter was removed today at the urology clinic.  Patient states that he has been having painful bladder spasms since being discharged associated with episodes of hyperventilation, painful urination and feeling unwell. He also reports a dry cough for the last 3 days. He denies any chest pain, nausea, vomiting and reports mild abdominal pain from laparoscopic incisions for his prostatectomy.      In the ER, UA was positive for urinary tract infection and started on Rocephin. WBC initially 13.8 on 6/8, resolved on 6/10. Urine culture positive for Klebsiella pneumoniae He has remained afebrile. Urologist, Dr Abbasi, was consulted, who recommended serial bladder scans with reinsertion of Martinez catheter if bladder scan greater than 500. However, bladder scans have not shown significant retention. He continues to have hematuria somewhat improved. Hemoglobin on 6/8 14.2, but down to 12.7 on 6/10, some of which is secondary to hemodilution. The patient continues to have urgency, frequency and hematuria with some discomfort post urination.    The patient was also noted to have intermittent episodes of hypoxia occasionally dropping to 88-89%; however, did not require supplemental oxygen, as the hypoxia was short lived each time. CT PE was negative for PE and negative for acute findings. Echocardiogram unremarkable. EF 65%.    Interval Problem Update  6/10: Patient seen and examined at bedside. He reports feeling much  better today, able to stand without feeling lightheaded, feels stonger and able to do ADLs this am. He continues to have hematuria. Hemoglobin on 6/8 14.2, but down to 12.7 today. Bladder scans are negative for significant urinary retention. He reports some discomfort post void. He remains afebrile, leukocytosis resolved.   His CAMPBELL and cough are resolved. Echo on 6/9 is unremarkable.  Urine culture positive for Klebsiella pneumoniae, susceptibilities pending      PLAN:  Continue IV Rocephin and IV fluids  Serial bladder scans, reinsert kruger if > 500 ml  Urology on consult  DC pending urology input    Consultants/Specialty  Urologist, Dr Abbasi    Code Status  Full Code    Disposition  DC to home in am if stable    Review of Systems  Review of Systems   Constitutional: Positive for malaise/fatigue. Negative for chills, fever and weight loss.   HENT: Negative for congestion, ear discharge, ear pain, hearing loss, sinus pain, sore throat and tinnitus.    Eyes: Negative for blurred vision, double vision, photophobia, pain and redness.   Respiratory: Negative for hemoptysis, sputum production and wheezing.    Cardiovascular: Negative for chest pain, palpitations, orthopnea, leg swelling and PND.   Gastrointestinal: Positive for constipation. Negative for blood in stool, diarrhea, nausea and vomiting.   Genitourinary: Positive for dysuria, frequency, hematuria and urgency. Negative for flank pain.   Musculoskeletal: Negative for back pain, falls, joint pain, myalgias and neck pain.   Skin: Negative for itching and rash.   Neurological: Negative for dizziness, tingling, tremors, sensory change, speech change, focal weakness, seizures, loss of consciousness and headaches.   Endo/Heme/Allergies: Negative for polydipsia.   Psychiatric/Behavioral: Negative for depression, memory loss, substance abuse and suicidal ideas. The patient is not nervous/anxious and does not have insomnia.         Physical Exam  Temp:  [36 °C (96.8  °F)-36.3 °C (97.4 °F)] 36 °C (96.8 °F)  Pulse:  [62-68] 62  Resp:  [16-18] 16  BP: (123-148)/(62-82) 138/66  SpO2:  [91 %-93 %] 93 %    Physical Exam  Constitutional:       Appearance: Normal appearance.   HENT:      Head: Normocephalic and atraumatic.      Mouth/Throat:      Mouth: Mucous membranes are moist.   Cardiovascular:      Rate and Rhythm: Normal rate and regular rhythm.      Heart sounds: No murmur heard.     Pulmonary:      Effort: Pulmonary effort is normal. No respiratory distress.      Breath sounds: No wheezing, rhonchi or rales.   Abdominal:      General: Distension:   lower abdominal tenderness.      Palpations: Abdomen is soft.      Tenderness: There is abdominal tenderness (  mild lower abdominal tenderness).   Genitourinary:     Comments: + hematuria  Musculoskeletal:      Comments: Moves all extremities   Skin:     General: Skin is warm and dry.   Neurological:      Mental Status: He is alert and oriented to person, place, and time. Mental status is at baseline.   Psychiatric:      Comments: Calm, cooperative during exam         Fluids    Intake/Output Summary (Last 24 hours) at 6/10/2021 0845  Last data filed at 6/10/2021 0432  Gross per 24 hour   Intake --   Output 1075 ml   Net -1075 ml       Laboratory  Recent Labs     06/08/21  1530 06/10/21  0114   WBC 13.8* 10.7   RBC 4.84 4.21*   HEMOGLOBIN 14.2 12.7*   HEMATOCRIT 44.2 38.6*   MCV 91.3 91.7   MCH 29.3 30.2   MCHC 32.1* 32.9*   RDW 48.6 49.1   PLATELETCT 378 325   MPV 9.7 9.5     Recent Labs     06/08/21  1530 06/09/21  0625 06/10/21  0114   SODIUM 139 143 138   POTASSIUM 4.1 3.8 3.8   CHLORIDE 106 112 107   CO2 21 21 20   GLUCOSE 158* 98 143*   BUN 23* 18 15   CREATININE 1.43* 1.01 1.01   CALCIUM 9.3 8.6 8.6                   Imaging  EC-ECHOCARDIOGRAM COMPLETE W/O CONT   Final Result      CT-CTA CHEST PULMONARY ARTERY W/ RECONS   Final Result      1.  No CT evidence of pulmonary embolism.      2.  No significant incidental  abnormalities are identified.            DX-CHEST-PORTABLE (1 VIEW)   Final Result         No acute cardiac or pulmonary abnormality is identified.           Assessment/Plan  * Urinary tract infection  Assessment & Plan  Continues to have urgency, frequency, lower abdominal discomfort, hematuria. Urine culture positive for Klebsiella pneumoniae, susceptibilities pending. BC so far negative  - f/u Blood cultures   - f/u susceptibilities   - continue IV fluids  - Continue with IV Rocephin     Dyspnea on exertion  Assessment & Plan  Was intermittently hypoxic at 88%, not requiring oxygen; however, this is now resolved. CTA negative for Pulmonary Embolism or other acute findings. EKG showing sinus rythum rate 80 with left anterior fasicular block. Echocardiogram unremarkable. EF 65%   - supplemental oxygen prn    Acute kidney injury (HCC)  Assessment & Plan  Resolved after IV Fluids.  - continue Serial bladder scan's - Urology, Dr. Abbasi, consulted by ERP if patient has more than 500 cc's he wants an 18 Wallisian coude catheter placed.   - Avoid nephrotoxins  - Renally dose medications.    - monitor    Hypertension  Assessment & Plan  Continue with lisinopril 10 mg nightly     Obesity (BMI 30-39.9)- (present on admission)  Assessment & Plan     Carb consistent 2 gram sodium diet     H/O prostatectomy  Assessment & Plan  History of robotic assisted prostatectomy on 6/1/2021 for severe BPH and bladder outlet obstruction by Dr. Aguirre on 6/1/2021. Post void residuals so far unremarkable. Continues to have hematuria  - continue bladder scans post void   - urology on consult    Hyperlipidemia- (present on admission)  Assessment & Plan  - Continue with rosuvastatin 20 mg daily     DM type 2 (diabetes mellitus, type 2) ([facility])- (present on admission)  Assessment & Plan  - Hold metformin, glipizide while in hospital.   - Resume Lantus 60 units nightly  - accucheck ac/hs with ssi coverage        VTE prophylaxis: SCDs

## 2021-06-10 NOTE — CARE PLAN
The patient is Stable - Low risk of patient condition declining or worsening    Shift Goals  Clinical Goals: Voiding without difficulties  Patient Goals: Discharge home w/ abx   Family Goals: NA    Progress made toward(s) clinical / shift goals:  obtain post void bladder scans    Patient is not progressing towards the following goals:      Problem: Pain - Standard  Goal: Alleviation of pain or a reduction in pain to the patient’s comfort goal  Outcome: Progressing     Problem: Knowledge Deficit - Standard  Goal: Patient and family/care givers will demonstrate understanding of plan of care, disease process/condition, diagnostic tests and medications  Outcome: Progressing     Problem: Urinary Elimination  Goal: Establish and maintain regular urinary output  Outcome: Progressing

## 2021-06-10 NOTE — CARE PLAN
Problem: Knowledge Deficit - Standard  Goal: Patient and family/care givers will demonstrate understanding of plan of care, disease process/condition, diagnostic tests and medications  Outcome: Progressing   The patient is Stable - Low risk of patient condition declining or worsening    Shift Goals  Clinical Goals: Voiding without difficulties  Patient Goals: Discharge home w/ abx   Family Goals: NA    Progress made toward(s) clinical / shift goals:  Pt educated regarding plan of care and medications. All questions answered.      Patient is not progressing towards the following goals:

## 2021-06-10 NOTE — DISCHARGE SUMMARY
Discharge Summary    CHIEF COMPLAINT ON ADMISSION  Chief Complaint   Patient presents with   • Weakness     x1 week   • Shortness of Breath     intermittent x1 week   • Cough     dry, non productive       Reason for Admission  Hematuria, dysuria     Admission Date  6/8/2021    CODE STATUS  Full Code    HPI & HOSPITAL COURSE  This is a pleasant 66 y.o. male with pmh of HTN, DM, recent robotic assisted prostatectomy on 6/1/2021 for severe BPH and bladder outlet obstruction by Dr. Aguirre, urology. The patient presented 6/8/2021 as a referral from Dr. Aguirre's office to rule out pulmonary embolism due to patient having complaints of shortness of breath in light of recent susrgery. His Martinez catheter was removed today at the urology clinic.  Patient states that he has been having painful bladder spasms since being discharged associated with episodes of hyperventilation, painful urination and feeling unwell. He also reports a dry cough for the last 3 days. He denies any chest pain, nausea, vomiting and reports mild abdominal pain from laparoscopic incisions for his prostatectomy.       In the ER, UA was positive for urinary tract infection and started on Rocephin. WBC initially 13.8 on 6/8, resolved on 6/10. Urine culture positive for Klebsiella pneumoniae. He has remained afebrile. Urologist, Dr Abbasi, was consulted, who recommended serial bladder scans with reinsertion of Martinez catheter if bladder scan greater than 500. However, bladder scans have not shown significant retention. He continues to have hematuria somewhat improved. Hemoglobin on 6/8 14.2, but down to 12.7 on 6/10, some of which is secondary to hemodilution. The patient continues to have urgency, frequency and hematuria with some discomfort post urination, though these symptoms have improved.     The patient was also noted to have intermittent episodes of hypoxia occasionally dropping to 88-89%; however, did not require supplemental oxygen, as the  hypoxia was short lived each time. This has since resolved. CT PE was negative for PE and negative for any other acute findings. Echocardiogram unremarkable. EF 65%.    The patient has been followed by urology during this admission. He will continue on Cefdinir for 4 days. He will follow up with the urology clinic in 1-2 weeks. He will also follow up with his PCP. Therefore, he is discharged in good and stable condition to home with close outpatient follow-up.        Discharge Date  6/10/21    FOLLOW UP ITEMS POST DISCHARGE  Take Cefdinir twice a day for 4 days beginning 6/11  Follow up with urology in 1-2 weeks (urology will call with appointment)  Follow up with PCP in 7-10 days or as previously scheduled.  Contact urology for worsening bleeding/urinary symptoms    DISCHARGE DIAGNOSES  Principal Problem:    Urinary tract infection POA: Unknown  Active Problems:    Obesity (BMI 30-39.9) POA: Yes    Hypertension POA: Unknown    DM type 2 (diabetes mellitus, type 2) (HCC) POA: Yes    Hyperlipidemia POA: Yes    H/O prostatectomy POA: Unknown  Resolved Problems:    Acute kidney injury (HCC) POA: Unknown    Dyspnea on exertion POA: Unknown      FOLLOW UP  Future Appointments   Date Time Provider Department Center   7/12/2021 10:00 AM RUCHI Perea [facility] PAOLA Beltran M.D.  [address]  [facility] [address]  [phone]      Follow up in 1-2 weeks    RUCHI Perea  [address]  [facility] [id number] [phone]    Schedule an appointment as soon as possible for a visit  Follow up in 1-2 weeks or as previously scheduled      MEDICATIONS ON DISCHARGE     Medication List      START taking these medications      Instructions   cefdinir 300 MG Caps  Start taking on: June 11, 2021  Commonly known as: OMNICEF   Take 1 capsule by mouth 2 times a day for 4 days.  Dose: 300 mg        CHANGE how you take these medications      Instructions   glipiZIDE 10 MG Tabs  What changed: when to take  this  Commonly known as: GLUCOTROL   Doctor's comments: Requesting 1 year supply  TAKE 1 TABLET BY MOUTH TWO  TIMES DAILY     Lantus SoloStar 100 UNIT/ML Sopn injection  What changed:   · how much to take  · how to take this  · when to take this  · additional instructions  Generic drug: insulin glargine   Inject 60 units sub q every evening as directed.     lisinopril 10 MG Tabs  What changed: when to take this  Commonly known as: PRINIVIL   Doctor's comments: Requesting 1 year supply  TAKE 1 TABLET BY MOUTH  EVERY DAY     metformin 1000 MG tablet  What changed:   · when to take this  · additional instructions  Commonly known as: GLUCOPHAGE   Doctor's comments: Requesting 1 year supply  TAKE 1 TABLET BY MOUTH TWO  TIMES DAILY WITH MEALS     rosuvastatin 20 MG Tabs  What changed: when to take this  Commonly known as: CRESTOR   Doctor's comments: Requesting 1 year supply  TAKE 1 TABLET BY MOUTH IN  THE EVENING        CONTINUE taking these medications      Instructions   B-D UF III MINI PEN NEEDLES 31G X 5 MM Misc  Generic drug: Insulin Pen Needle   USE 1 PENNEEDLE DAILY     BIOFREEZE EX   Apply 1 Application topically every day. Both knees  Dose: 1 Application     Blood Glucose Monitoring Suppl Misc   Use  TID  with symptoms of high/low blood sugar.     CINNAMON PO   Take 1 tablet by mouth 2 times a day.  Dose: 1 tablet     CO Q 10 PO   Take 1 tablet by mouth every day.  Dose: 1 tablet     GLUCOSAMINE HCL PO   Take 1 capsule by mouth 2 times a day.  Dose: 1 capsule     MULTI-VITAMIN DAILY PO   Take 1 tablet by mouth every day.  Dose: 1 tablet     tadalafil 5 MG tablet  Commonly known as: Cialis   Take 5 mg by mouth every evening.  Dose: 5 mg     VITAMIN D3 PO   Take 1 tablet by mouth every day.  Dose: 1 tablet     VITAMIN K PO   Take 1 tablet by mouth every day.  Dose: 1 tablet            Allergies  Allergies   Allergen Reactions   • Sulfa Drugs Rash     Red man syndrome      • Septra [Bactrim Ds] Rash     Per pt.  Doesn't remember          DIET  Orders Placed This Encounter   Procedures   • Diet Order Diet: Cardiac; Second Modifier: (optional): Consistent CHO (Diabetic)     Standing Status:   Standing     Number of Occurrences:   1     Order Specific Question:   Diet:     Answer:   Cardiac [6]     Order Specific Question:   Second Modifier: (optional)     Answer:   Consistent CHO (Diabetic) [4]       ACTIVITY  As tolerated.  Weight bearing as tolerated    CONSULTATIONS  Urology    PROCEDURES  None    LABORATORY  Lab Results   Component Value Date    SODIUM 138 06/10/2021    POTASSIUM 3.8 06/10/2021    CHLORIDE 107 06/10/2021    CO2 20 06/10/2021    GLUCOSE 143 (H) 06/10/2021    BUN 15 06/10/2021    CREATININE 1.01 06/10/2021    CREATININE 1.07 02/12/2011    GLOMRATE >59 02/12/2011        Lab Results   Component Value Date    WBC 10.7 06/10/2021    WBC 9.1 08/16/2010    HEMOGLOBIN 12.7 (L) 06/10/2021    HEMATOCRIT 38.6 (L) 06/10/2021    PLATELETCT 325 06/10/2021        Total time of the discharge process: 43 minutes

## 2021-06-10 NOTE — DISCHARGE INSTRUCTIONS
Discharge Instructions    Discharged to home by car with relative. Discharged via wheelchair, hospital escort: Yes.  Special equipment needed: Not Applicable    Be sure to schedule a follow-up appointment with your primary care doctor or any specialists as instructed.     Discharge Plan:   Diet Plan: Discussed  Activity Level: Discussed  Confirmed Follow up Appointment: Patient to Call and Schedule Appointment  Confirmed Symptoms Management: Discussed  Medication Reconciliation Updated: Yes    I understand that a diet low in cholesterol, fat, and sodium is recommended for good health. Unless I have been given specific instructions below for another diet, I accept this instruction as my diet prescription.   Other diet: Heart  Healthy/ Diabetic     Special Instructions: None    · Is patient discharged on Warfarin / Coumadin?   No     Depression / Suicide Risk    As you are discharged from this [facility] facility, it is important to learn how to keep safe from harming yourself.    Recognize the warning signs:  · Abrupt changes in personality, positive or negative- including increase in energy   · Giving away possessions  · Change in eating patterns- significant weight changes-  positive or negative  · Change in sleeping patterns- unable to sleep or sleeping all the time   · Unwillingness or inability to communicate  · Depression  · Unusual sadness, discouragement and loneliness  · Talk of wanting to die  · Neglect of personal appearance   · Rebelliousness- reckless behavior  · Withdrawal from people/activities they love  · Confusion- inability to concentrate     If you or a loved one observes any of these behaviors or has concerns about self-harm, here's what you can do:  · Talk about it- your feelings and reasons for harming yourself  · Remove any means that you might use to hurt yourself (examples: pills, rope, extension cords, firearm)  · Get professional help from the community (Mental Health, Substance Abuse,  psychological counseling)  · Do not be alone:Call your Safe Contact- someone whom you trust who will be there for you.  · Call your local CRISIS HOTLINE 384-9731 or 117-859-5404  · Call your local Children's Mobile Crisis Response Team [place] (357) 575-5641 or www.Leap  · Call the toll free National Suicide Prevention Hotlines   · National Suicide Prevention Lifeline 104-981-ULXF (4382)  · [employer] Line Network 800-SUICIDE (108-4144)      Take Cefdinir twice a day for 4 days beginning 6/11  Follow up with urology in 1-2 weeks (urology will call with appointment)  Follow up with PCP in 7-10 days or as previously scheduled.  Contact urology for worsening bleeding/urinary symptoms      Urinary Tract Infection, Adult    A urinary tract infection (UTI) is an infection of any part of the urinary tract. The urinary tract includes the kidneys, ureters, bladder, and urethra. These organs make, store, and get rid of urine in the body.  Your health care provider may use other names to describe the infection. An upper UTI affects the ureters and kidneys (pyelonephritis). A lower UTI affects the bladder (cystitis) and urethra (urethritis).  What are the causes?  Most urinary tract infections are caused by bacteria in your genital area, around the entrance to your urinary tract (urethra). These bacteria grow and cause inflammation of your urinary tract.  What increases the risk?  You are more likely to develop this condition if:  · You have a urinary catheter that stays in place (indwelling).  · You are not able to control when you urinate or have a bowel movement (you have incontinence).  · You are female and you:  ? Use a spermicide or diaphragm for birth control.  ? Have low estrogen levels.  ? Are pregnant.  · You have certain genes that increase your risk (genetics).  · You are sexually active.  · You take antibiotic medicines.  · You have a condition that causes your flow of urine to slow down, such  as:  ? An enlarged prostate, if you are male.  ? Blockage in your urethra (stricture).  ? A kidney stone.  ? A nerve condition that affects your bladder control (neurogenic bladder).  ? Not getting enough to drink, or not urinating often.  · You have certain medical conditions, such as:  ? Diabetes.  ? A weak disease-fighting system (immunesystem).  ? Sickle cell disease.  ? Gout.  ? Spinal cord injury.  What are the signs or symptoms?  Symptoms of this condition include:  · Needing to urinate right away (urgently).  · Frequent urination or passing small amounts of urine frequently.  · Pain or burning with urination.  · Blood in the urine.  · Urine that smells bad or unusual.  · Trouble urinating.  · Cloudy urine.  · Vaginal discharge, if you are female.  · Pain in the abdomen or the lower back.  You may also have:  · Vomiting or a decreased appetite.  · Confusion.  · Irritability or tiredness.  · A fever.  · Diarrhea.  The first symptom in older adults may be confusion. In some cases, they may not have any symptoms until the infection has worsened.  How is this diagnosed?  This condition is diagnosed based on your medical history and a physical exam. You may also have other tests, including:  · Urine tests.  · Blood tests.  · Tests for sexually transmitted infections (STIs).  If you have had more than one UTI, a cystoscopy or imaging studies may be done to determine the cause of the infections.  How is this treated?  Treatment for this condition includes:  · Antibiotic medicine.  · Over-the-counter medicines to treat discomfort.  · Drinking enough water to stay hydrated.  If you have frequent infections or have other conditions such as a kidney stone, you may need to see a health care provider who specializes in the urinary tract (urologist).  In rare cases, urinary tract infections can cause sepsis. Sepsis is a life-threatening condition that occurs when the body responds to an infection. Sepsis is treated in the  hospital with IV antibiotics, fluids, and other medicines.  Follow these instructions at home:    Medicines  · Take over-the-counter and prescription medicines only as told by your health care provider.  · If you were prescribed an antibiotic medicine, take it as told by your health care provider. Do not stop using the antibiotic even if you start to feel better.  General instructions  · Make sure you:  ? Empty your bladder often and completely. Do not hold urine for long periods of time.  ? Empty your bladder after sex.  ? Wipe from front to back after a bowel movement if you are female. Use each tissue one time when you wipe.  · Drink enough fluid to keep your urine pale yellow.  · Keep all follow-up visits as told by your health care provider. This is important.  Contact a health care provider if:  · Your symptoms do not get better after 1-2 days.  · Your symptoms go away and then return.  Get help right away if you have:  · Severe pain in your back or your lower abdomen.  · A fever.  · Nausea or vomiting.  Summary  · A urinary tract infection (UTI) is an infection of any part of the urinary tract, which includes the kidneys, ureters, bladder, and urethra.  · Most urinary tract infections are caused by bacteria in your genital area, around the entrance to your urinary tract (urethra).  · Treatment for this condition often includes antibiotic medicines.  · If you were prescribed an antibiotic medicine, take it as told by your health care provider. Do not stop using the antibiotic even if you start to feel better.  · Keep all follow-up visits as told by your health care provider. This is important.  This information is not intended to replace advice given to you by your health care provider. Make sure you discuss any questions you have with your health care provider.  Document Released: 09/27/2006 Document Revised: 12/05/2019 Document Reviewed: 06/27/2019  [employer] ([employer]) Patient Education © 2020 [employer]

## 2021-06-10 NOTE — DISCHARGE PLANNING
Care Transition Team Assessment    Attempted to speak with patient at bedside but patient not in room chart reviewed.     Information Source  Information Given By: Other (Comments) (Chart reviewed.)    Interdisciplinary Discharge Planning  Primary Care Physician: Earnestine Wilder  Patient or legal guardian wants to designate a caregiver: No  Support Systems: Spouse / Significant Other  Housing / Facility: [address]  Able to Return to Previous ADL's: Yes  Prior Services: Home-Independent  Assistance Needed: Unknown at this Time    Discharge Preparedness  What are your discharge supports?: Other (comment) (Significant other)  Prior Functional Level: Ambulatory    Functional Assesment  Prior Functional Level: Ambulatory    Finances  Prescription Coverage: Yes    Anticipated Discharge Information  Discharge Address: [address]  Discharge Contact Phone Number: [phone]

## 2021-06-10 NOTE — PROGRESS NOTES
Bedside report received 0768. POC discussed with pt; Pt denies pain; Hygiene items provided pt provided self care; Denies difficulty with urinating; Urine dark tea in color; Discussed need for PVR scans, Pt to notify treatment once voided; No overnight events; all questions answered at this time.

## 2021-06-10 NOTE — CARE PLAN
The patient is Stable - Low risk of patient condition declining or worsening    Shift Goals  Clinical Goals: Continue ABX Trx, void without complications  Patient Goals: No retention; continue rec. trx  Family Goals: NA    Progress made toward(s) clinical / shift goals:    Problem: Knowledge Deficit - Standard  Goal: Patient and family/care givers will demonstrate understanding of plan of care, disease process/condition, diagnostic tests and medications  6/10/2021 0858 by Traci James R.N.  Outcome: Progressing  6/9/2021 1944 by Traci James R.N.  Outcome: Progressing     Problem: Urinary Elimination  Goal: Establish and maintain regular urinary output  Outcome: Progressing   Pt educated regarding plan of care and medications. All questions answered.      Patient is not progressing towards the following goals:

## 2021-06-10 NOTE — PROGRESS NOTES
Note to reader: this note follows the APSO format rather than the historical SOAP format. Assessment and plan located at the top of the note for ease of use.    Chief Complaint  66 y.o. year old male here with weakness, SOB s/p RASP 6/1/21.     Assessment/Plan  Interval History   Klebsiella UTI  Weakness  SOB    Plan:  - OK to stop bladder scans  - Continue abx per hospitalist  - Urology signing off. We do not anticipate further urological intervention at this time.   - Will arrange outpatient f/u in 1-2 weeks   - Please contact us with questions    Patient seen and examined    6/10. PVRs continue to be minimal Feeling much better today. Strength improved. Ucx growing klebsiella. Still with hematuria and dysuria but improving.          Review of Systems  Physical Exam   Review of Systems   Constitutional: Positive for malaise/fatigue. Negative for chills and fever.   Gastrointestinal: Negative for abdominal pain, nausea and vomiting.   Genitourinary: Positive for dysuria, frequency, hematuria and urgency.     Vitals:    06/09/21 1616 06/09/21 1943 06/10/21 0432 06/10/21 0808   BP: 127/71 137/77 148/82 138/66   Pulse: 68   62   Resp: 18   16   Temp: 36.2 °C (97.2 °F) 36.3 °C (97.4 °F) 36.1 °C (97 °F) 36 °C (96.8 °F)   TempSrc: Temporal Temporal Temporal Temporal   SpO2: 91%   93%   Weight:       Height:         Physical Exam  Nursing note reviewed.   Constitutional:       Appearance: Normal appearance.   HENT:      Head: Normocephalic and atraumatic.      Nose: Nose normal.   Abdominal:      General: Abdomen is flat.   Neurological:      General: No focal deficit present.      Mental Status: He is alert and oriented to person, place, and time.   Psychiatric:         Mood and Affect: Mood normal.         Behavior: Behavior normal.          Hematology Chemistry   Lab Results   Component Value Date/Time    WBC 10.7 06/10/2021 01:14 AM    WBC 9.1 08/16/2010 12:00 AM    HEMOGLOBIN 12.7 (L) 06/10/2021 01:14 AM     HEMATOCRIT 38.6 (L) 06/10/2021 01:14 AM    PLATELETCT 325 06/10/2021 01:14 AM     Lab Results   Component Value Date/Time    SODIUM 138 06/10/2021 01:14 AM    POTASSIUM 3.8 06/10/2021 01:14 AM    CHLORIDE 107 06/10/2021 01:14 AM    CO2 20 06/10/2021 01:14 AM    GLUCOSE 143 (H) 06/10/2021 01:14 AM    BUN 15 06/10/2021 01:14 AM    CREATININE 1.01 06/10/2021 01:14 AM    CREATININE 1.07 02/12/2011 10:13 AM    GLOMRATE >59 02/12/2011 10:13 AM         Labs not explicitly included in this progress note were reviewed by the author.   Radiology/imaging not explicitly included in this progress note was reviewed by the author.     Labs reviewed, Medications reviewed and Radiology images reviewed

## 2021-06-11 NOTE — PROGRESS NOTES
Pt dc'd home. IV and monitor removed; monitor room notified. Pt left unit via walking with UC; Transported home by children. Personal belongings with pt when leaving unit. Pt given discharge instructions prior to leaving unit including where to  prescriptions and when to follow-up; verbalizes understanding. Copy of discharge instructions with pt and in the chart.

## 2021-06-13 LAB
BACTERIA BLD CULT: NORMAL
BACTERIA BLD CULT: NORMAL
SIGNIFICANT IND 70042: NORMAL
SIGNIFICANT IND 70042: NORMAL
SITE SITE: NORMAL
SITE SITE: NORMAL
SOURCE SOURCE: NORMAL
SOURCE SOURCE: NORMAL

## 2021-06-20 DIAGNOSIS — E11.9 DIABETES MELLITUS WITHOUT COMPLICATION (HCC): ICD-10-CM

## 2021-06-21 RX ORDER — GLIPIZIDE 10 MG/1
TABLET ORAL
Qty: 180 TABLET | Refills: 3 | Status: SHIPPED | OUTPATIENT
Start: 2021-06-21 | End: 2022-03-08 | Stop reason: SDUPTHER

## 2021-06-21 NOTE — TELEPHONE ENCOUNTER
Requested Prescriptions     Signed Prescriptions Disp Refills   • glipiZIDE (GLUCOTROL) 10 MG Tab 180 tablet 3     Sig: TAKE 1 TABLET BY MOUTH  TWICE DAILY     Authorizing Provider: CADENCE MCBRIDE A.P.R.N.

## 2021-07-19 ENCOUNTER — OFFICE VISIT (OUTPATIENT)
Dept: MEDICAL GROUP | Facility: PHYSICIAN GROUP | Age: 66
End: 2021-07-19
Payer: MEDICARE

## 2021-07-19 VITALS
RESPIRATION RATE: 18 BRPM | BODY MASS INDEX: 33.32 KG/M2 | HEIGHT: 71 IN | SYSTOLIC BLOOD PRESSURE: 110 MMHG | OXYGEN SATURATION: 96 % | TEMPERATURE: 97.3 F | DIASTOLIC BLOOD PRESSURE: 58 MMHG | WEIGHT: 238 LBS | HEART RATE: 68 BPM

## 2021-07-19 DIAGNOSIS — R97.20 ELEVATED PSA: ICD-10-CM

## 2021-07-19 DIAGNOSIS — R80.9 TYPE 2 DIABETES MELLITUS WITH MICROALBUMINURIA, WITHOUT LONG-TERM CURRENT USE OF INSULIN (HCC): ICD-10-CM

## 2021-07-19 DIAGNOSIS — I10 ESSENTIAL HYPERTENSION: ICD-10-CM

## 2021-07-19 DIAGNOSIS — E11.29 TYPE 2 DIABETES MELLITUS WITH MICROALBUMINURIA, WITHOUT LONG-TERM CURRENT USE OF INSULIN (HCC): ICD-10-CM

## 2021-07-19 DIAGNOSIS — E66.9 OBESITY (BMI 30-39.9): ICD-10-CM

## 2021-07-19 PROBLEM — G89.18 POSTOPERATIVE PAIN: Status: RESOLVED | Noted: 2021-06-02 | Resolved: 2021-07-19

## 2021-07-19 LAB
HBA1C MFR BLD: 6.8 % (ref 0–5.6)
INT CON NEG: NEGATIVE
INT CON POS: POSITIVE

## 2021-07-19 PROCEDURE — 83036 HEMOGLOBIN GLYCOSYLATED A1C: CPT | Performed by: NURSE PRACTITIONER

## 2021-07-19 PROCEDURE — 99213 OFFICE O/P EST LOW 20 MIN: CPT | Performed by: NURSE PRACTITIONER

## 2021-07-19 ASSESSMENT — FIBROSIS 4 INDEX: FIB4 SCORE: 0.94

## 2021-07-19 ASSESSMENT — PATIENT HEALTH QUESTIONNAIRE - PHQ9: CLINICAL INTERPRETATION OF PHQ2 SCORE: 0

## 2021-07-19 NOTE — LETTER
[facility]  RUCHI Perea  [address]  [place] [address]  Fax: [phone]   Authorization for Release/Disclosure of   Protected Health Information   Name: JOHN REYES : 1955 SSN: [id number]   Address: [address]  [place] [address] Phone:    [phone] (home) [phone] (work)   I authorize the entity listed below to release/disclose the PHI below to:   [facility] Perea and RUCHI Perea   [facility]   Address   City, [facility], [facility]   Phone:      Fax:     Reason for request: continuity of care   Information to be released:    [  ] LAST COLONOSCOPY,  including any PATH REPORT and follow-up  [  ] LAST FIT/COLOGUARD RESULT [  ] LAST DEXA  [  ] LAST MAMMOGRAM  [  ] LAST PAP  [  ] LAST LABS [ x ] RETINA EXAM REPORT  [  ] IMMUNIZATION RECORDS  [  ] Release all info      [  ] Check here and initial the line next to each item to release ALL health information INCLUDING  _____ Care and treatment for drug and / or alcohol abuse  _____ HIV testing, infection status, or AIDS  _____ Genetic Testing    DATES OF SERVICE OR TIME PERIOD TO BE DISCLOSED: _____________  I understand and acknowledge that:  * This Authorization may be revoked at any time by you in writing, except if your health information has already been used or disclosed.  * Your health information that will be used or disclosed as a result of you signing this authorization could be re-disclosed by the recipient. If this occurs, your re-disclosed health information may no longer be protected by State or Federal laws.  * You may refuse to sign this Authorization. Your refusal will not affect your ability to obtain treatment.  * This Authorization becomes effective upon signing and will  on (date) __________.      If no date is indicated, this Authorization will  one (1) year from the signature date.    Name: John Reyes    Signature:   Date:     2021       PLEASE FAX  REQUESTED RECORDS BACK TO: [phone]

## 2021-07-19 NOTE — ASSESSMENT & PLAN NOTE
Here for A1c check.  Recent hospitalization. Npo neuropathy reported.  No sores reported.  Has a reddened, non itchy area on right shin.  No injury reported.  Weight is down 12 pounds today.  A1c 6.8 today.  Taking lantus 60 units nightly and metformin 1000 units bid.

## 2021-07-19 NOTE — ASSESSMENT & PLAN NOTE
bp today at 110/58.  Taking lisinopril 10 mg daily.  No chest pain reported.  No shortness of breath.

## 2021-07-19 NOTE — ASSESSMENT & PLAN NOTE
Had recent TURP. Dr. Aguirre.  Had post op complication with infection. No fever or chills, no blood in urine.

## 2021-07-19 NOTE — PROGRESS NOTES
Chief Complaint   Patient presents with   • Follow-Up   • Diabetes   • Skin Discoloration     right lower leg       Subjective:   John Reyes is a 66 y.o. male here today for evaluation and management of:    Elevated PSA  Had recent TURP. Dr. Aguirre.  Had post op complication with infection. No fever or chills, no blood in urine.     DM type 2 (diabetes mellitus, type 2) ([facility])  Here for A1c check.  Recent hospitalization. Npo neuropathy reported.  No sores reported.  Has a reddened, non itchy area on right shin.  No injury reported.  Weight is down 12 pounds today.  A1c 6.8 today.  Taking lantus 60 units nightly and metformin 1000 units bid.     Obesity (BMI 30-39.9)  Down 12 pounds since last visit.  BMI 33 down from 34.      Hypertension  bp today at 110/58.  Taking lisinopril 10 mg daily.  No chest pain reported.  No shortness of breath.          Current medicines (including changes today)  Current Outpatient Medications   Medication Sig Dispense Refill   • glipiZIDE (GLUCOTROL) 10 MG Tab TAKE 1 TABLET BY MOUTH  TWICE DAILY 180 tablet 3   • Menthol, Topical Analgesic, (BIOFREEZE EX) Apply 1 Application topically every day. Both knees     • VITAMIN K PO Take 1 tablet by mouth every day.     • Cholecalciferol (VITAMIN D3 PO) Take 1 tablet by mouth every day.     • rosuvastatin (CRESTOR) 20 MG Tab TAKE 1 TABLET BY MOUTH IN  THE EVENING (Patient taking differently: Take 20 mg by mouth every day.) 90 tablet 3   • Coenzyme Q10 (CO Q 10 PO) Take 1 tablet by mouth every day.     • insulin glargine (LANTUS SOLOSTAR) 100 UNIT/ML Solution Pen-injector injection Inject 60 units sub q every evening as directed. (Patient taking differently: Inject 60-62 Units under the skin every evening.) 2000 mL 3   • metformin (GLUCOPHAGE) 1000 MG tablet TAKE 1 TABLET BY MOUTH TWO  TIMES DAILY WITH MEALS (Patient taking differently: Take 1,000 mg by mouth 2 times a day with meals. TAKE 1 TABLET BY MOUTH TWO  TIMES DAILY WITH MEALS) 180 Tab  "3   • lisinopril (PRINIVIL) 10 MG Tab TAKE 1 TABLET BY MOUTH  EVERY DAY (Patient taking differently: Take 10 mg by mouth every evening.) 90 Tab 4   • Multiple Vitamin (MULTI-VITAMIN DAILY PO) Take 1 tablet by mouth every day.     • CINNAMON PO Take 1 tablet by mouth 2 times a day.     • GLUCOSAMINE HCL PO Take 1 capsule by mouth 2 times a day.     • B-D UF III MINI PEN NEEDLES 31G X 5 MM Misc USE 1 PENNEEDLE DAILY 90 Each 1   • Blood Glucose Monitoring Suppl Misc Use  TID  with symptoms of high/low blood sugar. 100 Act 6   • tadalafil (CIALIS) 5 MG tablet Take 5 mg by mouth every evening. (Patient not taking: Reported on 7/19/2021)       No current facility-administered medications for this visit.     He  has a past medical history of Arrhythmia (05/13/2021), Arthritis (05/13/2021), BPH (benign prostatic hyperplasia) (3/11/2010), Bronchitis, Bulging lumbar disc, Cataract (05/13/2021), DDD (degenerative disc disease), cervical, Depression (11/4/2009), Diabetes, DIABETES MELLITUS (11/4/2009), Elevated liver enzymes (3/11/2010), Hyperlipidemia (11/4/2009), Hypertension, Lumbosacral disc disease, Pneumonia, Psychiatric disorder, Scarring of lung, and Torn meniscus (05/13/2021).    ROS as stated in hpi  No chest pain, no shortness of breath, no abdominal pain       Objective:     /58 (BP Location: Left arm, Patient Position: Sitting)   Pulse 68   Temp 36.3 °C (97.3 °F) (Temporal)   Resp 18   Ht 1.803 m (5' 11\")   Wt 108 kg (238 lb)   SpO2 96%  Body mass index is 33.19 kg/m². down 12 pounds.   Physical Exam:  Constitutional: Alert, no distress.  Skin: Warm, dry, good turgor,no cyanosis, no rashes in visible areas.  Eye: Equal, round and reactive, conjunctiva clear, lids normal.  Neuro: Cranial nerves 2-12 grossly intact.  No sensory deficit.  Respiratory: Unlabored respiratory effort, lungs clear to auscultation, no wheezes, no ronchi.  Cardiovascular: Normal S1, S2, no murmur, no edema.  Monofilament testing " with a 10 gram force: sensation intact: intact bilaterally  Visual Inspection: Feet without maceration, ulcers, fissures.  Pedal pulses: intact bilaterally    Psych: Alert and oriented x3, normal affect and mood and judgement.        Assessment and Plan:   The following treatment plan was discussed    1. Elevated PSA  Chronic, ongoing, recent turp with complication/infection.  Feeling well now.  Has follow up with urology this week.  No signs of infection at this time.     2. Type 2 diabetes mellitus with microalbuminuria, without long-term current use of insulin (HCC)  Chronic, ongoing. Improving.  A1c at 6.8 down from 7.3.  Weight is down.  Request retina records.  Monofilament WNL.  Request FBS record in two weeks.    - POCT Hemoglobin A1C  - CBC WITH DIFFERENTIAL; Future  - Comp Metabolic Panel; Future  - HEMOGLOBIN A1C; Future  - MICROALBUMIN CREAT RATIO URINE; Future  - Lipid Profile; Future    3. Obesity (BMI 30-39.9)  Chornic, ongoing, improving  Down 12 pounds.  Continue with portion control and dietary choices.  Encouraged exercise.  Monitor and follow.     4. Essential hypertension  Chronic, ongoing. At goal.  Continue lisinopril 10 mg daily.       Followup: No follow-ups on file.         Educated in proper administration of medication(s) ordered today including safety, possible SE, risks, benefits, rationale and alternatives to therapy.     Please note that this dictation was created using voice recognition software. I have made every reasonable attempt to correct obvious errors, but I expect that there are errors of grammar and possibly content that I did not discover before finalizing the note.

## 2021-07-22 ENCOUNTER — HOSPITAL ENCOUNTER (OUTPATIENT)
Facility: MEDICAL CENTER | Age: 66
End: 2021-07-22
Attending: PHYSICIAN ASSISTANT
Payer: MEDICARE

## 2021-07-22 PROCEDURE — 87086 URINE CULTURE/COLONY COUNT: CPT

## 2021-07-23 NOTE — DISCHARGE SUMMARY
Discharge Summary    CHIEF COMPLAINT ON ADMISSION  Chief Complaint   Patient presents with   • Weakness     x1 week   • Shortness of Breath     intermittent x1 week   • Cough     dry, non productive       Reason for Admission  Hematuria, dysuria     Admission Date  6/8/2021    CODE STATUS  Prior    HPI & HOSPITAL COURSE  This is a pleasant 66 y.o. male with pmh of HTN, DM, recent robotic assisted prostatectomy on 6/1/2021 for severe BPH and bladder outlet obstruction by Dr. Aguirre, urology. The patient presented 6/8/2021 as a referral from Dr. Aguirre's office to rule out pulmonary embolism due to patient having complaints of shortness of breath in light of recent susrgery. His Martinez catheter was removed today at the urology clinic.  Patient states that he has been having painful bladder spasms since being discharged associated with episodes of hyperventilation, painful urination and feeling unwell. He also reports a dry cough for the last 3 days. He denies any chest pain, nausea, vomiting and reports mild abdominal pain from laparoscopic incisions for his prostatectomy.       In the ER, UA was positive for urinary tract infection and started on Rocephin. WBC initially 13.8 on 6/8, resolved on 6/10. Urine culture positive for Klebsiella pneumoniae. He has remained afebrile. Urologist, Dr Abbasi, was consulted, who recommended serial bladder scans with reinsertion of Martinez catheter if bladder scan greater than 500. However, bladder scans have not shown significant retention. He continues to have hematuria somewhat improved. Hemoglobin on 6/8 14.2, but down to 12.7 on 6/10, some of which is secondary to hemodilution. The patient continues to have urgency, frequency and hematuria with some discomfort post urination, though these symptoms have improved.     The patient was also noted to have intermittent episodes of hypoxia occasionally dropping to 88-89%; however, did not require supplemental oxygen, as the hypoxia  was short lived each time. This has since resolved. CT PE was negative for PE and negative for any other acute findings. Echocardiogram unremarkable. EF 65%.    The patient has been followed by urology during this admission. He will continue on Cefdinir for 4 days. He will follow up with the urology clinic in 1-2 weeks. He will also follow up with his PCP. Therefore, he is discharged in good and stable condition to home with close outpatient follow-up.        Discharge Date  6/10/21    FOLLOW UP ITEMS POST DISCHARGE  Take Cefdinir twice a day for 4 days beginning 6/11  Follow up with urology in 1-2 weeks (urology will call with appointment)  Follow up with PCP in 7-10 days or as previously scheduled.  Contact urology for worsening bleeding/urinary symptoms    DISCHARGE DIAGNOSES  Principal Problem:    Urinary tract infection POA: Unknown  Active Problems:    Obesity (BMI 30-39.9) POA: Yes    Hypertension POA: Unknown    DM type 2 (diabetes mellitus, type 2) (HCC) POA: Yes    Hyperlipidemia POA: Yes    H/O prostatectomy POA: Unknown  Resolved Problems:    Acute kidney injury (HCC) POA: Unknown    Dyspnea on exertion POA: Unknown      FOLLOW UP  Future Appointments   Date Time Provider Department Center   7/12/2021 10:00 AM RUCHI Perea VMG PAOLA Beltran M.D.  [address] [facility] [address]  [phone]      Follow up in 1-2 weeks    RUCHI Perea  [address] [facility]  [address]  [facility] [id number] [phone]    Schedule an appointment as soon as possible for a visit  Follow up in 1-2 weeks or as previously scheduled      MEDICATIONS ON DISCHARGE     Medication List      CHANGE how you take these medications      Instructions   Lantus SoloStar 100 UNIT/ML Sopn injection  What changed:   · how much to take  · how to take this  · when to take this  · additional instructions  Generic drug: insulin glargine   Inject 60 units sub q every evening as directed.     lisinopril 10 MG Tabs  What  changed: when to take this  Commonly known as: PRINIVIL   Doctor's comments: Requesting 1 year supply  TAKE 1 TABLET BY MOUTH  EVERY DAY     metformin 1000 MG tablet  What changed:   · when to take this  · additional instructions  Commonly known as: GLUCOPHAGE   Doctor's comments: Requesting 1 year supply  TAKE 1 TABLET BY MOUTH TWO  TIMES DAILY WITH MEALS     rosuvastatin 20 MG Tabs  What changed: when to take this  Commonly known as: CRESTOR   Doctor's comments: Requesting 1 year supply  TAKE 1 TABLET BY MOUTH IN  THE EVENING        CONTINUE taking these medications      Instructions   B-D UF III MINI PEN NEEDLES 31G X 5 MM Misc  Generic drug: Insulin Pen Needle   USE 1 PENNEEDLE DAILY     BIOFREEZE EX   Apply 1 Application topically every day. Both knees  Dose: 1 Application     Blood Glucose Monitoring Suppl Misc   Use  TID  with symptoms of high/low blood sugar.     CINNAMON PO   Take 1 tablet by mouth 2 times a day.  Dose: 1 tablet     CO Q 10 PO   Take 1 tablet by mouth every day.  Dose: 1 tablet     GLUCOSAMINE HCL PO   Take 1 capsule by mouth 2 times a day.  Dose: 1 capsule     MULTI-VITAMIN DAILY PO   Take 1 tablet by mouth every day.  Dose: 1 tablet     tadalafil 5 MG tablet  Commonly known as: Cialis   Take 5 mg by mouth every evening.  Dose: 5 mg     VITAMIN D3 PO   Take 1 tablet by mouth every day.  Dose: 1 tablet     VITAMIN K PO   Take 1 tablet by mouth every day.  Dose: 1 tablet        ASK your doctor about these medications      Instructions   cefdinir 300 MG Caps  Commonly known as: OMNICEF  Ask about: Should I take this medication?   Take 1 capsule by mouth 2 times a day for 4 days.  Dose: 300 mg            Allergies  Allergies   Allergen Reactions   • Sulfa Drugs Rash     Red man syndrome      • Septra [Bactrim Ds] Rash     Per pt. Doesn't remember          DIET  No orders of the defined types were placed in this encounter.      ACTIVITY  As tolerated.  Weight bearing as  tolerated    CONSULTATIONS  Urology    PROCEDURES  None    LABORATORY  Lab Results   Component Value Date    SODIUM 138 06/10/2021    POTASSIUM 3.8 06/10/2021    CHLORIDE 107 06/10/2021    CO2 20 06/10/2021    GLUCOSE 143 (H) 06/10/2021    BUN 15 06/10/2021    CREATININE 1.01 06/10/2021    CREATININE 1.07 02/12/2011    GLOMRATE >59 02/12/2011        Lab Results   Component Value Date    WBC 10.7 06/10/2021    WBC 9.1 08/16/2010    HEMOGLOBIN 12.7 (L) 06/10/2021    HEMATOCRIT 38.6 (L) 06/10/2021    PLATELETCT 325 06/10/2021        Total time of the discharge process: 43 minutes

## 2021-07-25 LAB
BACTERIA UR CULT: NORMAL
SIGNIFICANT IND 70042: NORMAL
SITE SITE: NORMAL
SOURCE SOURCE: NORMAL

## 2021-08-02 DIAGNOSIS — E11.29 TYPE 2 DIABETES MELLITUS WITH MICROALBUMINURIA, WITHOUT LONG-TERM CURRENT USE OF INSULIN (HCC): ICD-10-CM

## 2021-08-02 DIAGNOSIS — R80.9 TYPE 2 DIABETES MELLITUS WITH MICROALBUMINURIA, WITHOUT LONG-TERM CURRENT USE OF INSULIN (HCC): ICD-10-CM

## 2021-08-02 RX ORDER — FLURBIPROFEN SODIUM 0.3 MG/ML
SOLUTION/ DROPS OPHTHALMIC
Qty: 90 EACH | Refills: 3 | Status: SHIPPED | OUTPATIENT
Start: 2021-08-02 | End: 2022-03-08 | Stop reason: SDUPTHER

## 2021-08-02 NOTE — TELEPHONE ENCOUNTER
Requested Prescriptions     Signed Prescriptions Disp Refills   • B-D UF III MINI PEN NEEDLES 31G X 5 MM Misc 90 Each 3     Sig: USE 1 PEN NEEDLE DAILY     Authorizing Provider: CADENCE MCBRIDE A.P.R.N.

## 2021-08-03 ASSESSMENT — ENCOUNTER SYMPTOMS
NAUSEA: 0
POLYDIPSIA: 0
FALLS: 0
HEADACHES: 0
SORE THROAT: 0
EYE REDNESS: 0
FLANK PAIN: 0
TREMORS: 0
DEPRESSION: 0
ORTHOPNEA: 0
ABDOMINAL PAIN: 1
MYALGIAS: 0
DIARRHEA: 0
BACK PAIN: 0
WHEEZING: 0
WEIGHT LOSS: 0
DIZZINESS: 0
DOUBLE VISION: 0
BLURRED VISION: 0
TINGLING: 0
CHILLS: 0
CONSTIPATION: 0
NECK PAIN: 0
HEMOPTYSIS: 0
VOMITING: 0
SHORTNESS OF BREATH: 1
LOSS OF CONSCIOUSNESS: 0
BLOOD IN STOOL: 0
PALPITATIONS: 0
INSOMNIA: 0
NERVOUS/ANXIOUS: 0
SENSORY CHANGE: 0
COUGH: 1
SPEECH CHANGE: 0
PHOTOPHOBIA: 0
PND: 0
FOCAL WEAKNESS: 0
SINUS PAIN: 0
SPUTUM PRODUCTION: 0
FEVER: 0
MEMORY LOSS: 0
SEIZURES: 0
EYE PAIN: 0

## 2021-08-03 ASSESSMENT — LIFESTYLE VARIABLES: SUBSTANCE_ABUSE: 0

## 2021-08-04 NOTE — PROGRESS NOTES
Hospital Medicine Daily Progress Note    Date of Service  8/3/2021    Chief Complaint  Shortness of breath, hematuria    Hospital Course  This is a pleasant 66 y.o. male with pmh of HTN, DM, recent robotic assisted prostatectomy on 6/1/2021 for severe BPH and bladder outlet obstruction by Dr. Aguirre, urology. The patient presented 6/8/2021 as a referral from Dr. Aguirre's office to rule out pulmonary embolism due to patient having complaints of shortness of breath in light of recent susrgery. His Kruger catheter was removed today at the urology clinic.  Patient states that he has been having painful bladder spasms since being discharged associated with episodes of hyperventilation, painful urination and feeling unwell. He also reports a dry cough for the last 3 days. He denies any chest pain, nausea, vomiting and reports mild abdominal pain from laparoscopic incisions for his prostatectomy.    In the ER, UA was positive for urinary tract infection and started on Rocephin. Urologist, Dr Abbasi, was consulted, who recommended serial bladder scans with reinsertion of Kruger catheter if bladder scan greater than 500.    The patient was also noted to have intermittent episodes of hypoxia occasionally dropping to 88-89%; however, did not require supplemental oxygen, as the hypoxia was short lived each time. CT PE was negative for PE and negative for acute findings.     Interval Problem Update  6/9: Patient seen and examined at bedside. He continues to have frequency, urgency and lower abdominal discomfort, as well as some hematuria. He says his cough is resolved and is not currently short of breath, has not required oxygen. He says he was feeling ill while in the waiting room at the urology clinic, but feels quite a bit better at this point since starting on abx    PLAN:  Continue IV Rocephin and IV fluids  Serial bladder scans, reinsert kruger if > 500 ml  Urology on consult    Consultants/Specialty  Urologist,   [place]    Code Status  Prior    Disposition  DC to home in am if stable    Review of Systems  Review of Systems   Constitutional: Positive for malaise/fatigue. Negative for chills, fever and weight loss.   HENT: Negative for congestion, ear discharge, ear pain, hearing loss, sinus pain, sore throat and tinnitus.    Eyes: Negative for blurred vision, double vision, photophobia, pain and redness.   Respiratory: Positive for cough and shortness of breath. Negative for hemoptysis, sputum production and wheezing.    Cardiovascular: Negative for chest pain, palpitations, orthopnea, leg swelling and PND.   Gastrointestinal: Positive for abdominal pain (  lower abdominal pain). Negative for blood in stool, constipation, diarrhea, nausea and vomiting.   Genitourinary: Positive for dysuria, frequency, hematuria and urgency. Negative for flank pain.   Musculoskeletal: Negative for back pain, falls, joint pain, myalgias and neck pain.   Skin: Negative for itching and rash.   Neurological: Negative for dizziness, tingling, tremors, sensory change, speech change, focal weakness, seizures, loss of consciousness and headaches.   Endo/Heme/Allergies: Negative for polydipsia.   Psychiatric/Behavioral: Negative for depression, memory loss, substance abuse and suicidal ideas. The patient is not nervous/anxious and does not have insomnia.         Physical Exam       Physical Exam  Constitutional:       Appearance: He is ill-appearing.   HENT:      Head: Normocephalic and atraumatic.      Mouth/Throat:      Mouth: Mucous membranes are moist.   Cardiovascular:      Rate and Rhythm: Normal rate and regular rhythm.      Heart sounds: No murmur heard.     Pulmonary:      Effort: Pulmonary effort is normal. No respiratory distress.      Breath sounds: No wheezing, rhonchi or rales.   Abdominal:      General: Distension:   lower abdominal tenderness.      Palpations: Abdomen is soft.      Tenderness: There is abdominal tenderness.    Musculoskeletal:      Comments: Moves all extremities   Skin:     General: Skin is warm and dry.   Neurological:      Mental Status: He is alert and oriented to person, place, and time. Mental status is at baseline.   Psychiatric:      Comments: Calm, cooperative during exam         Fluids  No intake or output data in the 24 hours ending 08/03/21 2025    Laboratory                        Imaging  EC-ECHOCARDIOGRAM COMPLETE W/O CONT   Final Result      CT-CTA CHEST PULMONARY ARTERY W/ RECONS   Final Result      1.  No CT evidence of pulmonary embolism.      2.  No significant incidental abnormalities are identified.            DX-CHEST-PORTABLE (1 VIEW)   Final Result         No acute cardiac or pulmonary abnormality is identified.           Assessment/Plan  * Urinary tract infection  Assessment & Plan  Continues to have urgency, frequency, lower abdominal discomfort, hematuria. Urine culture positive for Klebsiella pneumoniae, susceptibilities pending. BC so far negative  - f/u Blood cultures   - f/u susceptibilities   - continue IV fluids  - Continue with IV Rocephin     Hypertension  Assessment & Plan  Continue with lisinopril 10 mg nightly     Obesity (BMI 30-39.9)- (present on admission)  Assessment & Plan     Carb consistent 2 gram sodium diet     H/O prostatectomy  Assessment & Plan  History of robotic assisted prostatectomy on 6/1/2021 for severe BPH and bladder outlet obstruction by Dr. Aguirre on 6/1/2021. Post void residuals so far unremarkable. Continues to have hematuria  - continue bladder scans post void   - urology on consult    Hyperlipidemia- (present on admission)  Assessment & Plan  - Continue with rosuvastatin 20 mg daily     DM type 2 (diabetes mellitus, type 2) ([facility])- (present on admission)  Assessment & Plan  - Hold metformin, glipizide while in hospital.   - Resume Lantus 60 units nightly  - accucheck ac/hs with ssi coverage        VTE prophylaxis: SCDs

## 2021-08-08 NOTE — ASSESSMENT & PLAN NOTE
Bed: 30  Expected date: 8/8/21  Expected time: 3:11 PM  Means of arrival: [facility]  Comments:  51F seizure     Gladies Kocher, RN  08/08/21 0823 Followed by Dr. Acosta yearly.  Seen in January.

## 2021-08-13 ENCOUNTER — OFFICE VISIT (OUTPATIENT)
Dept: URGENT CARE | Facility: PHYSICIAN GROUP | Age: 66
End: 2021-08-13
Payer: MEDICARE

## 2021-08-13 ENCOUNTER — HOSPITAL ENCOUNTER (OUTPATIENT)
Dept: RADIOLOGY | Facility: MEDICAL CENTER | Age: 66
End: 2021-08-13
Attending: PHYSICIAN ASSISTANT
Payer: MEDICARE

## 2021-08-13 VITALS
TEMPERATURE: 98.4 F | OXYGEN SATURATION: 94 % | HEIGHT: 71 IN | RESPIRATION RATE: 16 BRPM | DIASTOLIC BLOOD PRESSURE: 82 MMHG | WEIGHT: 238 LBS | BODY MASS INDEX: 33.32 KG/M2 | HEART RATE: 67 BPM | SYSTOLIC BLOOD PRESSURE: 132 MMHG

## 2021-08-13 DIAGNOSIS — M79.672 LEFT FOOT PAIN: ICD-10-CM

## 2021-08-13 PROCEDURE — 73630 X-RAY EXAM OF FOOT: CPT | Mod: LT

## 2021-08-13 PROCEDURE — 99213 OFFICE O/P EST LOW 20 MIN: CPT | Performed by: PHYSICIAN ASSISTANT

## 2021-08-13 ASSESSMENT — FIBROSIS 4 INDEX: FIB4 SCORE: 0.94

## 2021-08-13 NOTE — LETTER
August 13, 2021         Patient: John Reyes   YOB: 1955   Date of Visit: 8/13/2021           To Whom it May Concern:    John Reyes was seen in my clinic on 8/13/2021.  Please excuse his absence from work tomorrow, 8/14/2021.  He may return to work on Tuesday, 8/17/2021.    If you have any questions or concerns, please don't hesitate to call.        Sincerely,           Jennifer Casiano P.A.-C.  Electronically Signed

## 2021-08-14 NOTE — PATIENT INSTRUCTIONS
Gout    Gout is a condition that causes painful swelling of the joints. Gout is a type of inflammation of the joints (arthritis). This condition is caused by having too much uric acid in the body. Uric acid is a chemical that forms when the body breaks down substances called purines. Purines are important for building body proteins.  When the body has too much uric acid, sharp crystals can form and build up inside the joints. This causes pain and swelling. Gout attacks can happen quickly and may be very painful (acute gout). Over time, the attacks can affect more joints and become more frequent (chronic gout). Gout can also cause uric acid to build up under the skin and inside the kidneys.  What are the causes?  This condition is caused by too much uric acid in your blood. This can happen because:  · Your kidneys do not remove enough uric acid from your blood. This is the most common cause.  · Your body makes too much uric acid. This can happen with some cancers and cancer treatments. It can also occur if your body is breaking down too many red blood cells (hemolytic anemia).  · You eat too many foods that are high in purines. These foods include organ meats and some seafood. Alcohol, especially beer, is also high in purines.  A gout attack may be triggered by trauma or stress.  What increases the risk?  You are more likely to develop this condition if you:  · Have a family history of gout.  · Are male and middle-aged.  · Are female and have gone through menopause.  · Are obese.  · Frequently drink alcohol, especially beer.  · Are dehydrated.  · Lose weight too quickly.  · Have an organ transplant.  · Have lead poisoning.  · Take certain medicines, including aspirin, cyclosporine, diuretics, levodopa, and niacin.  · Have kidney disease.  · Have a skin condition called psoriasis.  What are the signs or symptoms?  An attack of acute gout happens quickly. It usually occurs in just one joint. The most common place is  the big toe. Attacks often start at night. Other joints that may be affected include joints of the feet, ankle, knee, fingers, wrist, or elbow. Symptoms of this condition may include:  · Severe pain.  · Warmth.  · Swelling.  · Stiffness.  · Tenderness. The affected joint may be very painful to touch.  · Shiny, red, or purple skin.  · Chills and fever.  Chronic gout may cause symptoms more frequently. More joints may be involved. You may also have white or yellow lumps (tophi) on your hands or feet or in other areas near your joints.  How is this diagnosed?  This condition is diagnosed based on your symptoms, medical history, and physical exam. You may have tests, such as:  · Blood tests to measure uric acid levels.  · Removal of joint fluid with a thin needle (aspiration) to look for uric acid crystals.  · X-rays to look for joint damage.  How is this treated?  Treatment for this condition has two phases: treating an acute attack and preventing future attacks. Acute gout treatment may include medicines to reduce pain and swelling, including:  · NSAIDs.  · Steroids. These are strong anti-inflammatory medicines that can be taken by mouth (orally) or injected into a joint.  · Colchicine. This medicine relieves pain and swelling when it is taken soon after an attack. It can be given by mouth or through an IV.  Preventive treatment may include:  · Daily use of smaller doses of NSAIDs or colchicine.  · Use of a medicine that reduces uric acid levels in your blood.  · Changes to your diet. You may need to see a dietitian about what to eat and drink to prevent gout.  Follow these instructions at home:  During a gout attack    · If directed, put ice on the affected area:  ? Put ice in a plastic bag.  ? Place a towel between your skin and the bag.  ? Leave the ice on for 20 minutes, 2-3 times a day.  · Raise (elevate) the affected joint above the level of your heart as often as possible.  · Rest the joint as much as possible.  If the affected joint is in your leg, you may be given crutches to use.  · Follow instructions from your health care provider about eating or drinking restrictions.  Avoiding future gout attacks  · Follow a low-purine diet as told by your dietitian or health care provider. Avoid foods and drinks that are high in purines, including liver, kidney, anchovies, asparagus, herring, mushrooms, mussels, and beer.  · Maintain a healthy weight or lose weight if you are overweight. If you want to lose weight, talk with your health care provider. It is important that you do not lose weight too quickly.  · Start or maintain an exercise program as told by your health care provider.  Eating and drinking  · Drink enough fluids to keep your urine pale yellow.  · If you drink alcohol:  ? Limit how much you use to:  § 0-1 drink a day for women.  § 0-2 drinks a day for men.  ? Be aware of how much alcohol is in your drink. In the [place] one drink equals one 12 oz bottle of beer (355 mL) one 5 oz glass of wine (148 mL), or one 1½ oz glass of hard liquor (44 mL).  General instructions  · Take over-the-counter and prescription medicines only as told by your health care provider.  · Do not drive or use heavy machinery while taking prescription pain medicine.  · Return to your normal activities as told by your health care provider. Ask your health care provider what activities are safe for you.  · Keep all follow-up visits as told by your health care provider. This is important.  Contact a health care provider if you have:  · Another gout attack.  · Continuing symptoms of a gout attack after 10 days of treatment.  · Side effects from your medicines.  · Chills or a fever.  · Burning pain when you urinate.  · Pain in your lower back or belly.  Get help right away if you:  · Have severe or uncontrolled pain.  · Cannot urinate.  Summary  · Gout is painful swelling of the joints caused by inflammation.  · The most common site of pain is the big  toe, but it can affect other joints in the body.  · Medicines and dietary changes can help to prevent and treat gout attacks.  This information is not intended to replace advice given to you by your health care provider. Make sure you discuss any questions you have with your health care provider.  Document Released: 12/15/2001 Document Revised: 07/10/2019 Document Reviewed: 07/10/2019  [employer] Patient Education © 2020 [employer]      Low-Purine Eating Plan  A low-purine eating plan involves making food choices to limit your intake of purine. Purine is a kind of uric acid. Too much uric acid in your blood can cause certain conditions, such as gout and kidney stones. Eating a low-purine diet can help control these conditions.  What are tips for following this plan?  Reading food labels    · Avoid foods with saturated or Trans fat.  · Check the ingredient list of grains-based foods, such as bread and cereal, to make sure that they contain whole grains.  · Check the ingredient list of sauces or soups to make sure they do not contain meat or fish.  · When choosing soft drinks, check the ingredient list to make sure they do not contain high-fructose corn syrup.  Shopping  · Buy plenty of fresh fruits and vegetables.  · Avoid buying canned or fresh fish.  · Buy dairy products labeled as low-fat or nonfat.  · Avoid buying premade or processed foods. These foods are often high in fat, salt (sodium), and added sugar.  Cooking  · Use olive oil instead of butter when cooking. Oils like olive oil, canola oil, and sunflower oil contain healthy fats.  Meal planning  · Learn which foods do or do not affect you. If you find out that a food tends to cause your gout symptoms to flare up, avoid eating that food. You can enjoy foods that do not cause problems. If you have any questions about a food item, talk with your dietitian or health care provider.  · Limit foods high in fat, especially saturated fat. Fat makes it harder for  your body to get rid of uric acid.  · Choose foods that are lower in fat and are lean sources of protein.  General guidelines  · Limit alcohol intake to no more than 1 drink a day for nonpregnant women and 2 drinks a day for men. One drink equals 12 oz of beer, 5 oz of wine, or 1½ oz of hard liquor. Alcohol can affect the way your body gets rid of uric acid.  · Drink plenty of water to keep your urine clear or pale yellow. Fluids can help remove uric acid from your body.  · If directed by your health care provider, take a vitamin C supplement.  · Work with your health care provider and dietitian to develop a plan to achieve or maintain a healthy weight. Losing weight can help reduce uric acid in your blood.  What foods are recommended?  The items listed may not be a complete list. Talk with your dietitian about what dietary choices are best for you.  Foods low in purines  Foods low in purines do not need to be limited. These include:  · All fruits.  · All low-purine vegetables, pickles, and olives.  · Breads, pasta, rice, cornbread, and popcorn. Cake and other baked goods.  · All dairy foods.  · Eggs, nuts, and nut butters.  · Spices and condiments, such as salt, herbs, and vinegar.  · Plant oils, butter, and margarine.  · Water, sugar-free soft drinks, tea, coffee, and cocoa.  · Vegetable-based soups, broths, sauces, and gravies.  Foods moderate in purines  Foods moderate in purines should be limited to the amounts listed.  · ½ cup of asparagus, cauliflower, spinach, mushrooms, or green peas, each day.  · 2/3 cup uncooked oatmeal, each day.  · ¼ cup dry wheat bran or wheat germ, each day.  · 2-3 ounces of meat or poultry, each day.  · 4-6 ounces of shellfish, such as crab, lobster, oysters, or shrimp, each day.  · 1 cup cooked beans, peas, or lentils, each day.  · Soup, broths, or bouillon made from meat or fish. Limit these foods as much as possible.  What foods are not recommended?  The items listed may not be a  complete list. Talk with your dietitian about what dietary choices are best for you.  Limit your intake of foods high in purines, including:  · Beer and other alcohol.  · Meat-based gravy or sauce.  · Canned or fresh fish, such as:  ? Anchovies, sardines, herring, and tuna.  ? Mussels and scallops.  ? Codfish, trout, and berry.  · [place].  · Organ meats, such as:  ? Liver or kidney.  ? Tripe.  ? Sweetbreads (thymus gland or pancreas).  · Wild game or goose.  · Yeast or yeast extract supplements.  · Drinks sweetened with high-fructose corn syrup.  Summary  · Eating a low-purine diet can help control conditions caused by too much uric acid in the body, such as gout or kidney stones.  · Choose low-purine foods, limit alcohol, and limit foods high in fat.  · You will learn over time which foods do or do not affect you. If you find out that a food tends to cause your gout symptoms to flare up, avoid eating that food.  This information is not intended to replace advice given to you by your health care provider. Make sure you discuss any questions you have with your health care provider.  Document Released: 04/13/2012 Document Revised: 11/30/2018 Document Reviewed: 01/31/2018  [employer] Patient Education © 2020 [employer]

## 2021-08-20 PROBLEM — Z98.890 HISTORY OF SURGERY: Status: ACTIVE | Noted: 2021-06-08

## 2021-08-20 RX ORDER — MIRABEGRON 25 MG/1
TABLET, FILM COATED, EXTENDED RELEASE ORAL
COMMUNITY
End: 2022-03-08

## 2021-08-20 ASSESSMENT — ENCOUNTER SYMPTOMS
PALPITATIONS: 0
SORE THROAT: 0
CHILLS: 0
FEVER: 0
SHORTNESS OF BREATH: 0
VOMITING: 0
BLURRED VISION: 0
NAUSEA: 0
TINGLING: 0
SENSORY CHANGE: 0
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
## 2021-09-27 ENCOUNTER — NON-PROVIDER VISIT (OUTPATIENT)
Dept: URGENT CARE | Facility: PHYSICIAN GROUP | Age: 66
End: 2021-09-27
Payer: MEDICARE

## 2021-09-27 DIAGNOSIS — Z23 NEED FOR VACCINATION: ICD-10-CM

## 2021-09-27 PROCEDURE — 90662 IIV NO PRSV INCREASED AG IM: CPT | Performed by: NURSE PRACTITIONER

## 2021-09-27 PROCEDURE — G0008 ADMIN INFLUENZA VIRUS VAC: HCPCS | Performed by: NURSE PRACTITIONER

## 2021-09-27 NOTE — PROGRESS NOTES
"John Reyes is a 66 y.o. male here for a non-provider visit for:   FLU    Reason for immunization: Annual Flu Vaccine  Immunization records indicate need for vaccine: Yes, confirmed with Epic  Minimum interval has been met for this vaccine: Yes  ABN completed: Not Indicated    VIS Dated  8/6/21 was given to patient: Yes  All IAC Questionnaire questions were answered \"No.\"    Patient tolerated injection and no adverse effects were observed or reported: Yes    Pt scheduled for next dose in series: Not Indicated           "

## 2021-10-02 NOTE — TELEPHONE ENCOUNTER
Requested Prescriptions     Signed Prescriptions Disp Refills   • metformin (GLUCOPHAGE) 1000 MG tablet 180 Tablet 3     Sig: TAKE 1 TABLET BY MOUTH  TWICE DAILY WITH MEALS     Authorizing Provider: CADENCE MCBRIDE A.P.R.N.

## 2021-11-17 RX ORDER — LISINOPRIL 10 MG/1
TABLET ORAL
Qty: 90 TABLET | Refills: 1 | Status: SHIPPED | OUTPATIENT
Start: 2021-11-17 | End: 2022-03-08 | Stop reason: SDUPTHER

## 2021-11-17 NOTE — TELEPHONE ENCOUNTER
Requested Prescriptions     Signed Prescriptions Disp Refills   • lisinopril (PRINIVIL) 10 MG Tab 90 Tablet 1     Sig: TAKE 1 TABLET BY MOUTH  DAILY     Authorizing Provider: CADENCE MCBRIDE A.P.R.N.

## 2021-12-06 DIAGNOSIS — R80.9 TYPE 2 DIABETES MELLITUS WITH MICROALBUMINURIA, WITHOUT LONG-TERM CURRENT USE OF INSULIN (HCC): ICD-10-CM

## 2021-12-06 DIAGNOSIS — E11.29 TYPE 2 DIABETES MELLITUS WITH MICROALBUMINURIA, WITHOUT LONG-TERM CURRENT USE OF INSULIN (HCC): ICD-10-CM

## 2021-12-07 RX ORDER — INSULIN GLARGINE 100 [IU]/ML
60-62 INJECTION, SOLUTION SUBCUTANEOUS EVERY EVENING
Qty: 60 ML | Refills: 1 | Status: SHIPPED | OUTPATIENT
Start: 2021-12-07 | End: 2022-03-08 | Stop reason: SDUPTHER

## 2021-12-07 NOTE — TELEPHONE ENCOUNTER
I've received a refill request for lantus. I've sent in a refill but you do need to make an appointment to establish care with a new provider for any further refills.

## 2022-01-13 ENCOUNTER — HOSPITAL ENCOUNTER (OUTPATIENT)
Dept: LAB | Facility: MEDICAL CENTER | Age: 67
End: 2022-01-13
Attending: PHYSICIAN ASSISTANT
Payer: MEDICARE

## 2022-01-13 LAB — PSA SERPL-MCNC: 0.39 NG/ML (ref 0–4)

## 2022-01-13 PROCEDURE — 84153 ASSAY OF PSA TOTAL: CPT

## 2022-01-13 PROCEDURE — 36415 COLL VENOUS BLD VENIPUNCTURE: CPT

## 2022-02-15 RX ORDER — ROSUVASTATIN CALCIUM 20 MG/1
20 TABLET, COATED ORAL EVERY EVENING
Qty: 30 TABLET | Refills: 0 | Status: SHIPPED | OUTPATIENT
Start: 2022-02-15 | End: 2022-03-08 | Stop reason: SDUPTHER

## 2022-02-15 NOTE — TELEPHONE ENCOUNTER
Received request via: Pharmacy    Was the patient seen in the last year in this department? Yes    Does the patient have an active prescription (recently filled or refills available) for medication(s) requested? No     PATIENT HAS A NEW PATIENT APPT ON 3/8/22 WITH [facility] TO ESTABLISH CARE.

## 2022-03-08 ENCOUNTER — OFFICE VISIT (OUTPATIENT)
Dept: MEDICAL GROUP | Facility: PHYSICIAN GROUP | Age: 67
End: 2022-03-08
Payer: MEDICARE

## 2022-03-08 VITALS
BODY MASS INDEX: 34.16 KG/M2 | SYSTOLIC BLOOD PRESSURE: 132 MMHG | HEART RATE: 88 BPM | HEIGHT: 71 IN | DIASTOLIC BLOOD PRESSURE: 78 MMHG | WEIGHT: 244 LBS | OXYGEN SATURATION: 96 % | TEMPERATURE: 97.8 F

## 2022-03-08 DIAGNOSIS — Z76.89 ENCOUNTER TO ESTABLISH CARE: ICD-10-CM

## 2022-03-08 DIAGNOSIS — I10 PRIMARY HYPERTENSION: ICD-10-CM

## 2022-03-08 DIAGNOSIS — Z79.4 TYPE 2 DIABETES MELLITUS WITHOUT COMPLICATION, WITH LONG-TERM CURRENT USE OF INSULIN (HCC): ICD-10-CM

## 2022-03-08 DIAGNOSIS — E78.2 MIXED HYPERLIPIDEMIA: ICD-10-CM

## 2022-03-08 DIAGNOSIS — E55.9 VITAMIN D DEFICIENCY: ICD-10-CM

## 2022-03-08 DIAGNOSIS — E11.9 TYPE 2 DIABETES MELLITUS WITHOUT COMPLICATION, WITH LONG-TERM CURRENT USE OF INSULIN (HCC): ICD-10-CM

## 2022-03-08 LAB
HBA1C MFR BLD: 12.4 % (ref 0–5.6)
INT CON NEG: ABNORMAL
INT CON POS: ABNORMAL

## 2022-03-08 PROCEDURE — 83036 HEMOGLOBIN GLYCOSYLATED A1C: CPT | Performed by: NURSE PRACTITIONER

## 2022-03-08 PROCEDURE — 99214 OFFICE O/P EST MOD 30 MIN: CPT | Performed by: NURSE PRACTITIONER

## 2022-03-08 RX ORDER — LISINOPRIL 10 MG/1
10 TABLET ORAL DAILY
Qty: 90 TABLET | Refills: 3 | Status: SHIPPED | OUTPATIENT
Start: 2022-03-08 | End: 2023-03-14 | Stop reason: SDUPTHER

## 2022-03-08 RX ORDER — FLURBIPROFEN SODIUM 0.3 MG/ML
SOLUTION/ DROPS OPHTHALMIC
Qty: 90 EACH | Refills: 3 | Status: SHIPPED | OUTPATIENT
Start: 2022-03-08 | End: 2023-03-14

## 2022-03-08 RX ORDER — GLIPIZIDE 10 MG/1
10 TABLET ORAL 2 TIMES DAILY
Qty: 180 TABLET | Refills: 3 | Status: SHIPPED | OUTPATIENT
Start: 2022-03-08 | End: 2023-03-14 | Stop reason: SDUPTHER

## 2022-03-08 RX ORDER — INSULIN GLARGINE 100 [IU]/ML
60-62 INJECTION, SOLUTION SUBCUTANEOUS EVERY EVENING
Qty: 60 ML | Refills: 3 | Status: SHIPPED | OUTPATIENT
Start: 2022-03-08 | End: 2022-03-08

## 2022-03-08 RX ORDER — ROSUVASTATIN CALCIUM 20 MG/1
20 TABLET, COATED ORAL EVERY EVENING
Qty: 90 TABLET | Refills: 3 | Status: SHIPPED | OUTPATIENT
Start: 2022-03-08 | End: 2023-04-19

## 2022-03-08 RX ORDER — INSULIN GLARGINE 100 [IU]/ML
65 INJECTION, SOLUTION SUBCUTANEOUS EVERY EVENING
Qty: 60 ML | Refills: 3 | Status: SHIPPED | OUTPATIENT
Start: 2022-03-08 | End: 2022-09-07 | Stop reason: SDUPTHER

## 2022-03-08 ASSESSMENT — FIBROSIS 4 INDEX: FIB4 SCORE: 0.94

## 2022-03-08 ASSESSMENT — ENCOUNTER SYMPTOMS
FEVER: 0
PALPITATIONS: 0
CONSTITUTIONAL NEGATIVE: 1
COUGH: 0
PSYCHIATRIC NEGATIVE: 1
EYES NEGATIVE: 1
NEUROLOGICAL NEGATIVE: 1
GASTROINTESTINAL NEGATIVE: 1
SHORTNESS OF BREATH: 0
MUSCULOSKELETAL NEGATIVE: 1
SPUTUM PRODUCTION: 0

## 2022-03-08 ASSESSMENT — PATIENT HEALTH QUESTIONNAIRE - PHQ9: CLINICAL INTERPRETATION OF PHQ2 SCORE: 0
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
## 2022-03-08 NOTE — ASSESSMENT & PLAN NOTE
Chronic, stable; BP at borderline today 132/78; denies CP, palpitations, dizziness  - continue Lisinopril 10mg QD  - Annual CMP ordered today
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
## 2022-03-08 NOTE — ASSESSMENT & PLAN NOTE
Has been having increased leg/feet cramping at night; he started taking mag 1 tab which helped abit; he will try 2 tabs mag at night & if no improvement we will look at gabapentin; his DM2 also worsened the past 6 months & we are adjusting medications today  - continue Rosuvastatin 20mg QHS with Co Q10  - fasting lipid ordered today

## 2022-03-09 ENCOUNTER — TELEPHONE (OUTPATIENT)
Dept: MEDICAL GROUP | Facility: PHYSICIAN GROUP | Age: 67
End: 2022-03-09
Payer: MEDICARE

## 2022-03-09 DIAGNOSIS — Z79.4 TYPE 2 DIABETES MELLITUS WITHOUT COMPLICATION, WITH LONG-TERM CURRENT USE OF INSULIN (HCC): ICD-10-CM

## 2022-03-09 DIAGNOSIS — E11.9 TYPE 2 DIABETES MELLITUS WITHOUT COMPLICATION, WITH LONG-TERM CURRENT USE OF INSULIN (HCC): ICD-10-CM

## 2022-03-09 NOTE — TELEPHONE ENCOUNTER
Mail order pharmacy is calling to inform us that the test strips are not covered through their pharmacy. They are requesting us send them in to [facility]. Can you please send in new refill.

## 2022-03-10 DIAGNOSIS — E11.9 TYPE 2 DIABETES MELLITUS WITHOUT COMPLICATION, WITH LONG-TERM CURRENT USE OF INSULIN (HCC): ICD-10-CM

## 2022-03-10 DIAGNOSIS — Z79.4 TYPE 2 DIABETES MELLITUS WITHOUT COMPLICATION, WITH LONG-TERM CURRENT USE OF INSULIN (HCC): ICD-10-CM

## 2022-03-10 RX ORDER — BLOOD SUGAR DIAGNOSTIC
STRIP MISCELLANEOUS
Qty: 200 STRIP | Refills: 0 | Status: SHIPPED | OUTPATIENT
Start: 2022-03-10 | End: 2023-01-10

## 2022-03-10 NOTE — TELEPHONE ENCOUNTER
Received request via: Pharmacy    Was the patient seen in the last year in this department? Yes    Does the patient have an active prescription (recently filled or refills available) for medication(s) requested? No     Denisse, the script needs to include the name of his glucometer, which is a One Touch Ultra 2.  He also needs lancets in addition to the One Touch Ultra 2 Test Strips.

## 2022-06-06 ENCOUNTER — HOSPITAL ENCOUNTER (OUTPATIENT)
Dept: LAB | Facility: MEDICAL CENTER | Age: 67
End: 2022-06-06
Attending: NURSE PRACTITIONER
Payer: MEDICARE

## 2022-06-06 DIAGNOSIS — Z79.4 TYPE 2 DIABETES MELLITUS WITHOUT COMPLICATION, WITH LONG-TERM CURRENT USE OF INSULIN (HCC): ICD-10-CM

## 2022-06-06 DIAGNOSIS — E55.9 VITAMIN D DEFICIENCY: ICD-10-CM

## 2022-06-06 DIAGNOSIS — E78.2 MIXED HYPERLIPIDEMIA: ICD-10-CM

## 2022-06-06 DIAGNOSIS — E11.9 TYPE 2 DIABETES MELLITUS WITHOUT COMPLICATION, WITH LONG-TERM CURRENT USE OF INSULIN (HCC): ICD-10-CM

## 2022-06-06 DIAGNOSIS — I10 PRIMARY HYPERTENSION: ICD-10-CM

## 2022-06-06 LAB
25(OH)D3 SERPL-MCNC: 21 NG/ML (ref 30–100)
ALBUMIN SERPL BCP-MCNC: 4.3 G/DL (ref 3.2–4.9)
ALBUMIN/GLOB SERPL: 1.9 G/DL
ALP SERPL-CCNC: 68 U/L (ref 30–99)
ALT SERPL-CCNC: 45 U/L (ref 2–50)
ANION GAP SERPL CALC-SCNC: 8 MMOL/L (ref 7–16)
AST SERPL-CCNC: 32 U/L (ref 12–45)
BILIRUB SERPL-MCNC: 0.5 MG/DL (ref 0.1–1.5)
BUN SERPL-MCNC: 10 MG/DL (ref 8–22)
CALCIUM SERPL-MCNC: 8.8 MG/DL (ref 8.5–10.5)
CHLORIDE SERPL-SCNC: 106 MMOL/L (ref 96–112)
CHOLEST SERPL-MCNC: 109 MG/DL (ref 100–199)
CO2 SERPL-SCNC: 25 MMOL/L (ref 20–33)
CREAT SERPL-MCNC: 0.93 MG/DL (ref 0.5–1.4)
CREAT UR-MCNC: 214.45 MG/DL
FASTING STATUS PATIENT QL REPORTED: NORMAL
GFR SERPLBLD CREATININE-BSD FMLA CKD-EPI: 90 ML/MIN/1.73 M 2
GLOBULIN SER CALC-MCNC: 2.3 G/DL (ref 1.9–3.5)
GLUCOSE SERPL-MCNC: 167 MG/DL (ref 65–99)
HDLC SERPL-MCNC: 31 MG/DL
LDLC SERPL CALC-MCNC: 36 MG/DL
MICROALBUMIN UR-MCNC: 4.1 MG/DL
MICROALBUMIN/CREAT UR: 19 MG/G (ref 0–30)
POTASSIUM SERPL-SCNC: 4.2 MMOL/L (ref 3.6–5.5)
PROT SERPL-MCNC: 6.6 G/DL (ref 6–8.2)
SODIUM SERPL-SCNC: 139 MMOL/L (ref 135–145)
TRIGL SERPL-MCNC: 210 MG/DL (ref 0–149)

## 2022-06-06 PROCEDURE — 80053 COMPREHEN METABOLIC PANEL: CPT

## 2022-06-06 PROCEDURE — 82043 UR ALBUMIN QUANTITATIVE: CPT

## 2022-06-06 PROCEDURE — 82306 VITAMIN D 25 HYDROXY: CPT

## 2022-06-06 PROCEDURE — 80061 LIPID PANEL: CPT

## 2022-06-06 PROCEDURE — 82570 ASSAY OF URINE CREATININE: CPT

## 2022-06-06 PROCEDURE — 36415 COLL VENOUS BLD VENIPUNCTURE: CPT

## 2022-06-07 ENCOUNTER — OFFICE VISIT (OUTPATIENT)
Dept: MEDICAL GROUP | Facility: PHYSICIAN GROUP | Age: 67
End: 2022-06-07
Payer: MEDICARE

## 2022-06-07 VITALS
TEMPERATURE: 97.6 F | DIASTOLIC BLOOD PRESSURE: 70 MMHG | SYSTOLIC BLOOD PRESSURE: 108 MMHG | WEIGHT: 248 LBS | HEART RATE: 63 BPM | OXYGEN SATURATION: 97 % | BODY MASS INDEX: 34.59 KG/M2

## 2022-06-07 DIAGNOSIS — Z79.4 TYPE 2 DIABETES MELLITUS WITHOUT COMPLICATION, WITH LONG-TERM CURRENT USE OF INSULIN (HCC): ICD-10-CM

## 2022-06-07 DIAGNOSIS — E11.9 TYPE 2 DIABETES MELLITUS WITHOUT COMPLICATION, WITH LONG-TERM CURRENT USE OF INSULIN (HCC): ICD-10-CM

## 2022-06-07 LAB
HBA1C MFR BLD: 9.5 % (ref 0–5.6)
INT CON NEG: ABNORMAL
INT CON POS: ABNORMAL

## 2022-06-07 PROCEDURE — 83036 HEMOGLOBIN GLYCOSYLATED A1C: CPT | Performed by: NURSE PRACTITIONER

## 2022-06-07 PROCEDURE — 99214 OFFICE O/P EST MOD 30 MIN: CPT | Performed by: NURSE PRACTITIONER

## 2022-06-07 ASSESSMENT — FIBROSIS 4 INDEX: FIB4 SCORE: 0.98
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
## 2022-06-07 NOTE — PROGRESS NOTES
1. Type 2 diabetes mellitus without complication, with long-term current use of insulin (HCC)  Resending Jardiance prescription to [facility] pharmacy for pre- auth  - Empagliflozin 10 MG Tab; Take 1 Tablet by mouth every day.  Dispense: 90 Tablet; Refill: 3     [facility]   [address] [facility], [address]   OP NOTE       Name:  Jaret Adame   MR#:  [id number]   :  1946   Account #:  [de-identified]    Surgery Date:  2017   Date of Adm:  2017       PREOPERATIVE DIAGNOSIS: Hematemesis. POSTOPERATIVE DIAGNOSIS: Hiatal hernia and mild gastritis,   otherwise, normal exam.     PROCEDURES PERFORMED:  EGD. SURGEON: Edel Emanuel MD    ESTIMATED BLOOD LOSS: None. SPECIMENS REMOVED: None. ANESTHESIA:  Versed 2 mg. COMPLICATIONS: None. DESCRIPTION OF PROCEDURE: After informed consent, the   Olympus GIF-190 was inserted into the esophagus under direct   visualization. The esophageal mucosa was normal. There were hiatal   hernia ran for 3 cm. The gastric mucosa was mildly edematous   consistent with a gastritis. Retained food was seen along the greater   curvature of the stomach and on into the duodenum. There was no   active bleeding noted. No old blood noted in the stomach either. There   were no ulcers identified any where in the stomach nor any in the   duodenum. A retroflex view of the stomach was normal. The scope was   then removed taking insufflated air with it. IMPRESSION: Hiatal hernia and mild gastritis, but no evidence of   active bleeding. PLAN: Proceed with a lower exam, since she is having blood from   below.           MD Eileen Cespedes   D:  2017   20:40   T:  2017   10:44   Job #:  [id number]

## 2022-06-09 ENCOUNTER — TELEPHONE (OUTPATIENT)
Dept: MEDICAL GROUP | Facility: PHYSICIAN GROUP | Age: 67
End: 2022-06-09

## 2022-06-09 NOTE — TELEPHONE ENCOUNTER
DOCUMENTATION OF PAR STATUS:    1. Name of Medication & Dose: Jardiance     2. Name of Prescription Coverage Company & phone #: [employer]    3. Date Prior Auth Submitted: 06/23/22    4. What information was given to obtain insurance decision? Medical rational, tried medication, icd 10 code    5. Prior Auth Status? Pending    6. Patient Notified: N\A      Insurance will cover    Glimepiride  Glipizide  GlipizideER  Glipizide-metformin  pioglitazone

## 2022-06-17 NOTE — TELEPHONE ENCOUNTER
FINAL PRIOR AUTHORIZATION STATUS:    1.  Name of Medication & Dose: Jardiance 10mg      2. Prior Auth Status: Approved through 12/31/22     3. Action Taken: Pharmacy Notified: yes Patient Notified: n/a

## 2022-09-07 ENCOUNTER — OFFICE VISIT (OUTPATIENT)
Dept: MEDICAL GROUP | Facility: PHYSICIAN GROUP | Age: 67
End: 2022-09-07
Payer: MEDICARE

## 2022-09-07 VITALS
RESPIRATION RATE: 16 BRPM | HEART RATE: 72 BPM | BODY MASS INDEX: 36.41 KG/M2 | OXYGEN SATURATION: 98 % | DIASTOLIC BLOOD PRESSURE: 80 MMHG | HEIGHT: 67 IN | TEMPERATURE: 97.3 F | SYSTOLIC BLOOD PRESSURE: 122 MMHG | WEIGHT: 232 LBS

## 2022-09-07 DIAGNOSIS — Z79.4 TYPE 2 DIABETES MELLITUS WITHOUT COMPLICATION, WITH LONG-TERM CURRENT USE OF INSULIN (HCC): ICD-10-CM

## 2022-09-07 DIAGNOSIS — I10 PRIMARY HYPERTENSION: ICD-10-CM

## 2022-09-07 DIAGNOSIS — E66.01 MORBID (SEVERE) OBESITY DUE TO EXCESS CALORIES (HCC): ICD-10-CM

## 2022-09-07 DIAGNOSIS — E66.9 OBESITY (BMI 30-39.9): ICD-10-CM

## 2022-09-07 DIAGNOSIS — E11.9 TYPE 2 DIABETES MELLITUS WITHOUT COMPLICATION, WITH LONG-TERM CURRENT USE OF INSULIN (HCC): ICD-10-CM

## 2022-09-07 LAB
HBA1C MFR BLD: 6.7 % (ref 0–5.6)
INT CON NEG: ABNORMAL
INT CON POS: ABNORMAL

## 2022-09-07 PROCEDURE — 83036 HEMOGLOBIN GLYCOSYLATED A1C: CPT | Performed by: NURSE PRACTITIONER

## 2022-09-07 PROCEDURE — 99214 OFFICE O/P EST MOD 30 MIN: CPT | Performed by: NURSE PRACTITIONER

## 2022-09-07 RX ORDER — INSULIN GLARGINE 100 [IU]/ML
9 INJECTION, SOLUTION SUBCUTANEOUS EVERY EVENING
Qty: 9 ML | Refills: 3 | Status: SHIPPED | OUTPATIENT
Start: 2022-09-07 | End: 2023-03-14 | Stop reason: SDUPTHER

## 2022-09-07 ASSESSMENT — ENCOUNTER SYMPTOMS
SHORTNESS OF BREATH: 0
FEVER: 0
SPUTUM PRODUCTION: 0
NEUROLOGICAL NEGATIVE: 1
PSYCHIATRIC NEGATIVE: 1
COUGH: 0
GASTROINTESTINAL NEGATIVE: 1
MUSCULOSKELETAL NEGATIVE: 1
EYES NEGATIVE: 1
CONSTITUTIONAL NEGATIVE: 1
PALPITATIONS: 0

## 2022-09-07 ASSESSMENT — FIBROSIS 4 INDEX: FIB4 SCORE: 0.98
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
## 2022-09-07 NOTE — ASSESSMENT & PLAN NOTE
A1C in clinic today 6.7 (down from 9.5); he has been strict with diet & currently at <50g carbs daily; his FBG continues to improve each week & he is titrating down lantus each week  - Continue Glipizide 10mg BID, Metformin 1000mg BID, Jardiance 10mg QD; may continue oral cinnamon as well if he sees improvement in fasting BG  - continue Lantus 9 units QHS; may reduce by 1-2 units every 1-2 weeks with fasting glucose trend <130   - recheck A1C in 3 months

## 2022-09-15 DIAGNOSIS — Z79.4 TYPE 2 DIABETES MELLITUS WITHOUT COMPLICATION, WITH LONG-TERM CURRENT USE OF INSULIN (HCC): ICD-10-CM

## 2022-09-15 DIAGNOSIS — E11.9 TYPE 2 DIABETES MELLITUS WITHOUT COMPLICATION, WITH LONG-TERM CURRENT USE OF INSULIN (HCC): ICD-10-CM

## 2022-11-02 ENCOUNTER — PATIENT MESSAGE (OUTPATIENT)
Dept: HEALTH INFORMATION MANAGEMENT | Facility: OTHER | Age: 67
End: 2022-11-02

## 2022-11-11 DIAGNOSIS — Z79.4 TYPE 2 DIABETES MELLITUS WITHOUT COMPLICATION, WITH LONG-TERM CURRENT USE OF INSULIN (HCC): ICD-10-CM

## 2022-11-11 DIAGNOSIS — E11.9 TYPE 2 DIABETES MELLITUS WITHOUT COMPLICATION, WITH LONG-TERM CURRENT USE OF INSULIN (HCC): ICD-10-CM

## 2022-11-12 SDOH — ECONOMIC STABILITY: TRANSPORTATION INSECURITY
IN THE PAST 12 MONTHS, HAS THE LACK OF TRANSPORTATION KEPT YOU FROM MEDICAL APPOINTMENTS OR FROM GETTING MEDICATIONS?: NO

## 2022-11-12 SDOH — HEALTH STABILITY: PHYSICAL HEALTH: ON AVERAGE, HOW MANY DAYS PER WEEK DO YOU ENGAGE IN MODERATE TO STRENUOUS EXERCISE (LIKE A BRISK WALK)?: 1 DAY

## 2022-11-12 SDOH — ECONOMIC STABILITY: INCOME INSECURITY: HOW HARD IS IT FOR YOU TO PAY FOR THE VERY BASICS LIKE FOOD, HOUSING, MEDICAL CARE, AND HEATING?: NOT VERY HARD

## 2022-11-12 SDOH — ECONOMIC STABILITY: HOUSING INSECURITY
IN THE LAST 12 MONTHS, WAS THERE A TIME WHEN YOU DID NOT HAVE A STEADY PLACE TO SLEEP OR SLEPT IN A SHELTER (INCLUDING NOW)?: NO

## 2022-11-12 SDOH — ECONOMIC STABILITY: TRANSPORTATION INSECURITY
IN THE PAST 12 MONTHS, HAS LACK OF RELIABLE TRANSPORTATION KEPT YOU FROM MEDICAL APPOINTMENTS, MEETINGS, WORK OR FROM GETTING THINGS NEEDED FOR DAILY LIVING?: NO

## 2022-11-12 SDOH — ECONOMIC STABILITY: FOOD INSECURITY: WITHIN THE PAST 12 MONTHS, THE FOOD YOU BOUGHT JUST DIDN'T LAST AND YOU DIDN'T HAVE MONEY TO GET MORE.: NEVER TRUE

## 2022-11-12 SDOH — ECONOMIC STABILITY: FOOD INSECURITY: WITHIN THE PAST 12 MONTHS, YOU WORRIED THAT YOUR FOOD WOULD RUN OUT BEFORE YOU GOT MONEY TO BUY MORE.: NEVER TRUE

## 2022-11-12 SDOH — ECONOMIC STABILITY: TRANSPORTATION INSECURITY
IN THE PAST 12 MONTHS, HAS LACK OF TRANSPORTATION KEPT YOU FROM MEETINGS, WORK, OR FROM GETTING THINGS NEEDED FOR DAILY LIVING?: NO

## 2022-11-12 SDOH — HEALTH STABILITY: MENTAL HEALTH
STRESS IS WHEN SOMEONE FEELS TENSE, NERVOUS, ANXIOUS, OR CAN'T SLEEP AT NIGHT BECAUSE THEIR MIND IS TROUBLED. HOW STRESSED ARE YOU?: TO SOME EXTENT

## 2022-11-12 SDOH — ECONOMIC STABILITY: INCOME INSECURITY: IN THE LAST 12 MONTHS, WAS THERE A TIME WHEN YOU WERE NOT ABLE TO PAY THE MORTGAGE OR RENT ON TIME?: NO

## 2022-11-12 SDOH — HEALTH STABILITY: PHYSICAL HEALTH: ON AVERAGE, HOW MANY MINUTES DO YOU ENGAGE IN EXERCISE AT THIS LEVEL?: 30 MIN

## 2022-11-12 SDOH — ECONOMIC STABILITY: HOUSING INSECURITY: IN THE LAST 12 MONTHS, HOW MANY PLACES HAVE YOU LIVED?: 1

## 2022-11-12 ASSESSMENT — LIFESTYLE VARIABLES
HOW OFTEN DO YOU HAVE SIX OR MORE DRINKS ON ONE OCCASION: NEVER
SKIP TO QUESTIONS 9-10: 1
HOW MANY STANDARD DRINKS CONTAINING ALCOHOL DO YOU HAVE ON A TYPICAL DAY: 1 OR 2
HOW OFTEN DO YOU HAVE A DRINK CONTAINING ALCOHOL: MONTHLY OR LESS
AUDIT-C TOTAL SCORE: 1

## 2022-11-12 ASSESSMENT — SOCIAL DETERMINANTS OF HEALTH (SDOH)
HOW OFTEN DO YOU GET TOGETHER WITH FRIENDS OR RELATIVES?: ONCE A WEEK
DO YOU BELONG TO ANY CLUBS OR ORGANIZATIONS SUCH AS CHURCH GROUPS UNIONS, FRATERNAL OR ATHLETIC GROUPS, OR SCHOOL GROUPS?: NO
HOW OFTEN DO YOU HAVE A DRINK CONTAINING ALCOHOL: MONTHLY OR LESS
WITHIN THE PAST 12 MONTHS, YOU WORRIED THAT YOUR FOOD WOULD RUN OUT BEFORE YOU GOT THE MONEY TO BUY MORE: NEVER TRUE
HOW OFTEN DO YOU ATTENT MEETINGS OF THE CLUB OR ORGANIZATION YOU BELONG TO?: NEVER
HOW OFTEN DO YOU HAVE SIX OR MORE DRINKS ON ONE OCCASION: NEVER
IN A TYPICAL WEEK, HOW MANY TIMES DO YOU TALK ON THE PHONE WITH FAMILY, FRIENDS, OR NEIGHBORS?: MORE THAN THREE TIMES A WEEK
IN A TYPICAL WEEK, HOW MANY TIMES DO YOU TALK ON THE PHONE WITH FAMILY, FRIENDS, OR NEIGHBORS?: MORE THAN THREE TIMES A WEEK
HOW MANY DRINKS CONTAINING ALCOHOL DO YOU HAVE ON A TYPICAL DAY WHEN YOU ARE DRINKING: 1 OR 2
HOW HARD IS IT FOR YOU TO PAY FOR THE VERY BASICS LIKE FOOD, HOUSING, MEDICAL CARE, AND HEATING?: NOT VERY HARD
DO YOU BELONG TO ANY CLUBS OR ORGANIZATIONS SUCH AS CHURCH GROUPS UNIONS, FRATERNAL OR ATHLETIC GROUPS, OR SCHOOL GROUPS?: NO
HOW OFTEN DO YOU ATTEND CHURCH OR RELIGIOUS SERVICES?: NEVER
HOW OFTEN DO YOU GET TOGETHER WITH FRIENDS OR RELATIVES?: ONCE A WEEK
HOW OFTEN DO YOU ATTENT MEETINGS OF THE CLUB OR ORGANIZATION YOU BELONG TO?: NEVER
HOW OFTEN DO YOU ATTEND CHURCH OR RELIGIOUS SERVICES?: NEVER

## 2022-11-12 NOTE — PROGRESS NOTES
1. Type 2 diabetes mellitus without complication, with long-term current use of insulin (HCC)  Refill request from pharmacy  - Empagliflozin 10 MG Tab; Take 1 Tablet by mouth every day.  Dispense: 90 Tablet; Refill: 3

## 2022-11-15 ENCOUNTER — OFFICE VISIT (OUTPATIENT)
Dept: MEDICAL GROUP | Facility: PHYSICIAN GROUP | Age: 67
End: 2022-11-15
Payer: MEDICARE

## 2022-11-15 VITALS
RESPIRATION RATE: 16 BRPM | DIASTOLIC BLOOD PRESSURE: 78 MMHG | HEART RATE: 67 BPM | HEIGHT: 71 IN | TEMPERATURE: 96.6 F | WEIGHT: 232 LBS | OXYGEN SATURATION: 94 % | BODY MASS INDEX: 32.48 KG/M2 | SYSTOLIC BLOOD PRESSURE: 136 MMHG

## 2022-11-15 DIAGNOSIS — G89.29 CHRONIC PAIN OF LEFT KNEE: ICD-10-CM

## 2022-11-15 DIAGNOSIS — I10 PRIMARY HYPERTENSION: ICD-10-CM

## 2022-11-15 DIAGNOSIS — M25.562 CHRONIC PAIN OF LEFT KNEE: ICD-10-CM

## 2022-11-15 DIAGNOSIS — Z00.00 ENCOUNTER FOR ANNUAL WELLNESS VISIT (AWV) IN MEDICARE PATIENT: ICD-10-CM

## 2022-11-15 DIAGNOSIS — Z87.828 H/O BACK INJURY: ICD-10-CM

## 2022-11-15 DIAGNOSIS — Z79.4 TYPE 2 DIABETES MELLITUS WITHOUT COMPLICATION, WITH LONG-TERM CURRENT USE OF INSULIN (HCC): ICD-10-CM

## 2022-11-15 DIAGNOSIS — E78.2 MIXED HYPERLIPIDEMIA: ICD-10-CM

## 2022-11-15 DIAGNOSIS — E11.9 TYPE 2 DIABETES MELLITUS WITHOUT COMPLICATION, WITH LONG-TERM CURRENT USE OF INSULIN (HCC): ICD-10-CM

## 2022-11-15 PROCEDURE — G0439 PPPS, SUBSEQ VISIT: HCPCS | Performed by: NURSE PRACTITIONER

## 2022-11-15 ASSESSMENT — PATIENT HEALTH QUESTIONNAIRE - PHQ9
5. POOR APPETITE OR OVEREATING: 0 - NOT AT ALL
SUM OF ALL RESPONSES TO PHQ QUESTIONS 1-9: 7
CLINICAL INTERPRETATION OF PHQ2 SCORE: 2

## 2022-11-15 ASSESSMENT — ENCOUNTER SYMPTOMS: GENERAL WELL-BEING: GOOD

## 2022-11-15 ASSESSMENT — ACTIVITIES OF DAILY LIVING (ADL): BATHING_REQUIRES_ASSISTANCE: 0

## 2022-11-15 ASSESSMENT — FIBROSIS 4 INDEX: FIB4 SCORE: 0.98

## 2022-11-15 NOTE — ASSESSMENT & PLAN NOTE
Latest Reference Range & Units 06/06/22 06:39   Cholesterol,Tot 100 - 199 mg/dL 109   Triglycerides 0 - 149 mg/dL 210 (H)   HDL >=40 mg/dL 31 !   LDL <100 mg/dL 36     Had been having increased leg/feet cramping at night & started taking mag 1 tab which helped abit; he will try 2 tabs mag at night & if no improvement we will look at gabapentin; Also with improved DM2, A1c now at 6.7  - continue Rosuvastatin 20mg QHS with Co Q10  - monitor annual fasting lipid

## 2022-11-15 NOTE — ASSESSMENT & PLAN NOTE
Chronic, stable; BP at borderline today 136/78; denies CP, palpitations, dizziness; has not taken medications this morning yet.  GFR 90, creat 0.93  - continue Lisinopril 10mg QD  - monitor annual CMP

## 2022-11-15 NOTE — ASSESSMENT & PLAN NOTE
A1C 6.7 in clinic at last visit on 9/7/2022 (down from 9.5); he has been strict with diet & currently at <50g carbs daily; his FBG continues to improve each week & he is titrating down lantus each week  - Continue Glipizide 10mg BID, Metformin 1000mg BID, Jardiance 10mg QD; may continue oral cinnamon as well if he sees improvement in fasting BG  - continue Lantus 9 units QHS; may reduce by 1-2 units every 1-2 weeks with fasting glucose trend <130   - recheck A1C in 3 months
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
## 2022-11-15 NOTE — ASSESSMENT & PLAN NOTE
Ocassionaly will get low back pain, sometimes radiates down right leg. Does not have pain today. Seems to get aggravated with extreme weather and certain activities which required prolonged standing or lifting.   - continue conservative measures with stretches. Can use heat/cold application, over the counter topical analgesics like icy/hot as needed  - he let me know if pain frequency/intensity increases so we can send him to PT

## 2022-11-15 NOTE — ASSESSMENT & PLAN NOTE
Hx meniscal tear in the past; states with his history of arthritis, surgical repair was not recommended at the time. He does have knee brace and will continue to use as needed for added support, especially with task that may require prolonged standing, walking.

## 2022-12-13 ENCOUNTER — OFFICE VISIT (OUTPATIENT)
Dept: MEDICAL GROUP | Facility: PHYSICIAN GROUP | Age: 67
End: 2022-12-13
Payer: MEDICARE

## 2022-12-13 VITALS
SYSTOLIC BLOOD PRESSURE: 126 MMHG | BODY MASS INDEX: 31.64 KG/M2 | OXYGEN SATURATION: 98 % | TEMPERATURE: 96.8 F | HEART RATE: 64 BPM | RESPIRATION RATE: 18 BRPM | DIASTOLIC BLOOD PRESSURE: 78 MMHG | WEIGHT: 226 LBS | HEIGHT: 71 IN

## 2022-12-13 DIAGNOSIS — E55.9 VITAMIN D DEFICIENCY: ICD-10-CM

## 2022-12-13 DIAGNOSIS — Z79.4 TYPE 2 DIABETES MELLITUS WITHOUT COMPLICATION, WITH LONG-TERM CURRENT USE OF INSULIN (HCC): ICD-10-CM

## 2022-12-13 DIAGNOSIS — E11.9 TYPE 2 DIABETES MELLITUS WITHOUT COMPLICATION, WITH LONG-TERM CURRENT USE OF INSULIN (HCC): ICD-10-CM

## 2022-12-13 LAB
HBA1C MFR BLD: 6.9 % (ref 0–5.6)
INT CON NEG: ABNORMAL
INT CON POS: ABNORMAL

## 2022-12-13 PROCEDURE — 99214 OFFICE O/P EST MOD 30 MIN: CPT | Performed by: NURSE PRACTITIONER

## 2022-12-13 PROCEDURE — 83036 HEMOGLOBIN GLYCOSYLATED A1C: CPT | Performed by: NURSE PRACTITIONER

## 2022-12-13 ASSESSMENT — ENCOUNTER SYMPTOMS
PSYCHIATRIC NEGATIVE: 1
SHORTNESS OF BREATH: 0
GASTROINTESTINAL NEGATIVE: 1
FEVER: 0
MUSCULOSKELETAL NEGATIVE: 1
PALPITATIONS: 0
COUGH: 0
CONSTITUTIONAL NEGATIVE: 1
SPUTUM PRODUCTION: 0
NEUROLOGICAL NEGATIVE: 1

## 2022-12-13 ASSESSMENT — FIBROSIS 4 INDEX: FIB4 SCORE: 0.98
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
## 2022-12-13 NOTE — ASSESSMENT & PLAN NOTE
A1C 6.9 in clinic today (up from 6.7); goal <6.5  He has been strict with diet & currently at <50g carbs daily; his FBG continues to improve each week & he is titrating down lantus each week; averaging 130-140s, a few 150s FBG at home). he has noticed low mood, reduced motivation to get active; he will increase vit D supplements (low in the past)  - Continue Glipizide 10mg BID, Metformin 1000mg BID, Jardiance 10mg QD; may continue oral cinnamon as well if he sees improvement in fasting BG  - continue Lantus 7 units QHS; may reduce by 1-2 units every 1-2 weeks with fasting glucose trend <130   - recheck A1C in 3 months

## 2022-12-13 NOTE — ASSESSMENT & PLAN NOTE
Feeling unmotivated to exercise, low energy; was depressed in the past, did not like being on bupropion  - increase vit D supplement with additional 2000 units daily (in addition to daily MVI)  - recheck vit D level in 3 months

## 2022-12-21 ENCOUNTER — TELEPHONE (OUTPATIENT)
Dept: MEDICAL GROUP | Facility: PHYSICIAN GROUP | Age: 67
End: 2022-12-21
Payer: MEDICARE

## 2022-12-21 NOTE — TELEPHONE ENCOUNTER
DOCUMENTATION OF PAR STATUS:    1. Name of Medication & Dose: Jardiance     2. Name of Prescription Coverage Company & phone #: [employer]  phone [phone]    3. Date Prior Auth Submitted: 12/21/22    4. What information was given to obtain insurance decision? ICD Codes, demographics, Member number     5. Prior Auth Status? Approved Through 2023 Pending  Still pending 12/23/22 @ 2:20  Checked 12/27/2022 still pending with a note asking us to check later.    6. Patient Notified: no, This approval was initially sent from Insurance before patient was out of medication, and is now approved for a year.

## 2023-01-09 DIAGNOSIS — E11.9 TYPE 2 DIABETES MELLITUS WITHOUT COMPLICATION, WITH LONG-TERM CURRENT USE OF INSULIN (HCC): ICD-10-CM

## 2023-01-09 DIAGNOSIS — Z79.4 TYPE 2 DIABETES MELLITUS WITHOUT COMPLICATION, WITH LONG-TERM CURRENT USE OF INSULIN (HCC): ICD-10-CM

## 2023-01-10 RX ORDER — BLOOD SUGAR DIAGNOSTIC
1 STRIP MISCELLANEOUS
Qty: 400 STRIP | Refills: 3 | Status: SHIPPED | OUTPATIENT
Start: 2023-01-10 | End: 2023-03-14

## 2023-02-06 ENCOUNTER — HOSPITAL ENCOUNTER (OUTPATIENT)
Dept: LAB | Facility: MEDICAL CENTER | Age: 68
End: 2023-02-06
Attending: UROLOGY
Payer: MEDICARE

## 2023-02-06 LAB — PSA SERPL-MCNC: 0.58 NG/ML (ref 0–4)

## 2023-02-06 PROCEDURE — 36415 COLL VENOUS BLD VENIPUNCTURE: CPT

## 2023-02-06 PROCEDURE — 84153 ASSAY OF PSA TOTAL: CPT

## 2023-03-06 ENCOUNTER — HOSPITAL ENCOUNTER (OUTPATIENT)
Dept: LAB | Facility: MEDICAL CENTER | Age: 68
End: 2023-03-06
Attending: NURSE PRACTITIONER
Payer: MEDICARE

## 2023-03-06 DIAGNOSIS — Z79.4 TYPE 2 DIABETES MELLITUS WITHOUT COMPLICATION, WITH LONG-TERM CURRENT USE OF INSULIN (HCC): ICD-10-CM

## 2023-03-06 DIAGNOSIS — E11.9 TYPE 2 DIABETES MELLITUS WITHOUT COMPLICATION, WITH LONG-TERM CURRENT USE OF INSULIN (HCC): ICD-10-CM

## 2023-03-06 DIAGNOSIS — E55.9 VITAMIN D DEFICIENCY: ICD-10-CM

## 2023-03-06 LAB
25(OH)D3 SERPL-MCNC: 35 NG/ML (ref 30–100)
EST. AVERAGE GLUCOSE BLD GHB EST-MCNC: 177 MG/DL
HBA1C MFR BLD: 7.8 % (ref 4–5.6)

## 2023-03-06 PROCEDURE — 36415 COLL VENOUS BLD VENIPUNCTURE: CPT

## 2023-03-06 PROCEDURE — 83036 HEMOGLOBIN GLYCOSYLATED A1C: CPT | Mod: GA

## 2023-03-06 PROCEDURE — 82306 VITAMIN D 25 HYDROXY: CPT

## 2023-03-14 ENCOUNTER — OFFICE VISIT (OUTPATIENT)
Dept: MEDICAL GROUP | Facility: PHYSICIAN GROUP | Age: 68
End: 2023-03-14
Payer: MEDICARE

## 2023-03-14 VITALS
WEIGHT: 227 LBS | SYSTOLIC BLOOD PRESSURE: 138 MMHG | TEMPERATURE: 98 F | HEART RATE: 68 BPM | OXYGEN SATURATION: 98 % | DIASTOLIC BLOOD PRESSURE: 70 MMHG | BODY MASS INDEX: 35.63 KG/M2 | HEIGHT: 67 IN

## 2023-03-14 DIAGNOSIS — Z79.4 TYPE 2 DIABETES MELLITUS WITHOUT COMPLICATION, WITH LONG-TERM CURRENT USE OF INSULIN (HCC): ICD-10-CM

## 2023-03-14 DIAGNOSIS — E11.9 TYPE 2 DIABETES MELLITUS WITHOUT COMPLICATION, WITH LONG-TERM CURRENT USE OF INSULIN (HCC): ICD-10-CM

## 2023-03-14 DIAGNOSIS — E66.01 MORBID (SEVERE) OBESITY DUE TO EXCESS CALORIES (HCC): ICD-10-CM

## 2023-03-14 DIAGNOSIS — I10 PRIMARY HYPERTENSION: ICD-10-CM

## 2023-03-14 PROCEDURE — 99214 OFFICE O/P EST MOD 30 MIN: CPT | Performed by: NURSE PRACTITIONER

## 2023-03-14 RX ORDER — LISINOPRIL 10 MG/1
10 TABLET ORAL DAILY
Qty: 90 TABLET | Refills: 3 | Status: SHIPPED | OUTPATIENT
Start: 2023-03-14 | End: 2024-01-23

## 2023-03-14 RX ORDER — FLURBIPROFEN SODIUM 0.3 MG/ML
SOLUTION/ DROPS OPHTHALMIC
Qty: 90 EACH | Refills: 3 | Status: SHIPPED | OUTPATIENT
Start: 2023-03-14

## 2023-03-14 RX ORDER — BLOOD SUGAR DIAGNOSTIC
STRIP MISCELLANEOUS
Qty: 100 STRIP | Refills: 3 | Status: SHIPPED | OUTPATIENT
Start: 2023-03-14

## 2023-03-14 RX ORDER — INSULIN GLARGINE 100 [IU]/ML
10 INJECTION, SOLUTION SUBCUTANEOUS EVERY EVENING
Qty: 9 ML | Refills: 3 | Status: ON HOLD | OUTPATIENT
Start: 2023-03-14 | End: 2023-10-09 | Stop reason: SDUPTHER

## 2023-03-14 RX ORDER — GLIPIZIDE 10 MG/1
10 TABLET ORAL 2 TIMES DAILY
Qty: 180 TABLET | Refills: 3 | Status: SHIPPED | OUTPATIENT
Start: 2023-03-14 | End: 2023-06-06

## 2023-03-14 ASSESSMENT — ENCOUNTER SYMPTOMS
SPUTUM PRODUCTION: 0
CONSTITUTIONAL NEGATIVE: 1
PALPITATIONS: 0
COUGH: 0
FEVER: 0
SHORTNESS OF BREATH: 0
NEUROLOGICAL NEGATIVE: 1
GASTROINTESTINAL NEGATIVE: 1
INSOMNIA: 1
MUSCULOSKELETAL NEGATIVE: 1

## 2023-03-14 ASSESSMENT — FIBROSIS 4 INDEX: FIB4 SCORE: 0.98

## 2023-03-14 ASSESSMENT — PATIENT HEALTH QUESTIONNAIRE - PHQ9: CLINICAL INTERPRETATION OF PHQ2 SCORE: 0
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
## 2023-03-14 NOTE — ASSESSMENT & PLAN NOTE
Chronic; goal A1C < 6.5  A1C 7.8 on 3/6/2023 (up from 6.9)  He is trying to be strict with diet & currently at <50g carbs daily; his FBG increased the past month 2/2023, averaging 160-180s; he increased lantus to 10 units (from 7 units) with the increase in BG; attributes this to food and he will adjust diet some more.  He is worried metformin is causing insomnia.   - Continue Glipizide 10mg BID, Metformin 1000mg BID, Jardiance 10mg QD; may continue oral cinnamon as well if he sees improvement in fasting BG  - continue Lantus 10 units QHS; may reduce by 1-2 units every 1-2 weeks with fasting glucose trend <130  - referral to see pharm for medication review and adjustment as needed   - recheck A1C in 3 months

## 2023-03-15 ENCOUNTER — DOCUMENTATION (OUTPATIENT)
Dept: VASCULAR LAB | Facility: MEDICAL CENTER | Age: 68
End: 2023-03-15
Payer: MEDICARE

## 2023-03-15 NOTE — PROGRESS NOTES
[facility]    Received pharmacotherapy (diabetes) referral from Denisse Mata D.N.P. on 3-14-23.    S/w patient regarding referral.  NP appt made for 4-4-23.    Insurance: Medicare  PCP: [facility]  Locations to be seen: Any    [facility] at [phone], fax [phone]    Sancho Crisostomo, SirishaD, BCACP

## 2023-03-17 ENCOUNTER — TELEPHONE (OUTPATIENT)
Dept: MEDICAL GROUP | Facility: PHYSICIAN GROUP | Age: 68
End: 2023-03-17
Payer: MEDICARE

## 2023-03-17 NOTE — TELEPHONE ENCOUNTER
Phone Number Called: [phone]/[facility]    Call outcome: Spoke to patient regarding message below.    Message: faxed received from [facility] to verify patients dosage decrease. Message was sent to Denisse regarding this she replied on note 3/14/23 that this is the correct dose. I called and verified with a pharmacist at [facility] that this was the correct dose. They said they would fill and get out to the patient.

## 2023-04-04 ENCOUNTER — NON-PROVIDER VISIT (OUTPATIENT)
Dept: MEDICAL GROUP | Facility: PHYSICIAN GROUP | Age: 68
End: 2023-04-04
Payer: MEDICARE

## 2023-04-04 PROCEDURE — 99211 OFF/OP EST MAY X REQ PHY/QHP: CPT | Performed by: STUDENT IN AN ORGANIZED HEALTH CARE EDUCATION/TRAINING PROGRAM
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
## 2023-04-13 ENCOUNTER — NON-PROVIDER VISIT (OUTPATIENT)
Dept: URGENT CARE | Facility: PHYSICIAN GROUP | Age: 68
End: 2023-04-13
Payer: MEDICARE

## 2023-04-13 DIAGNOSIS — Z11.1 ENCOUNTER FOR PPD TEST: Primary | ICD-10-CM

## 2023-04-13 PROCEDURE — 86580 TB INTRADERMAL TEST: CPT | Performed by: NURSE PRACTITIONER

## 2023-04-13 NOTE — PROGRESS NOTES
John Reyes is a 67 y.o. male here for a non-provider visit for PPD placement -- Step 1 of 1    Reason for PPD:  work requirement    1. TB evaluation questionnaire completed by patient? Yes      -  If any answers marked yes did you contact a provider prior to placing? Not Indicated  2.  Patient notified to return to clinic for reading on: after 04/15/23, or before 04/16/23  3.  PPD Placement documentation completed on TB evaluation questionnaire? yes  4.  Location of TB evaluation questionnaire filed: yes

## 2023-04-15 ENCOUNTER — NON-PROVIDER VISIT (OUTPATIENT)
Dept: URGENT CARE | Facility: PHYSICIAN GROUP | Age: 68
End: 2023-04-15
Payer: MEDICARE

## 2023-04-15 LAB — TB WHEAL 3D P 5 TU DIAM: 0 MM

## 2023-05-02 ENCOUNTER — NON-PROVIDER VISIT (OUTPATIENT)
Dept: MEDICAL GROUP | Facility: PHYSICIAN GROUP | Age: 68
End: 2023-05-02
Payer: MEDICARE

## 2023-05-02 PROCEDURE — 99211 OFF/OP EST MAY X REQ PHY/QHP: CPT | Performed by: STUDENT IN AN ORGANIZED HEALTH CARE EDUCATION/TRAINING PROGRAM

## 2023-05-02 RX ORDER — DULAGLUTIDE 0.75 MG/.5ML
1 INJECTION, SOLUTION SUBCUTANEOUS
Qty: 2.92 ML | Refills: 0 | Status: SHIPPED | OUTPATIENT
Start: 2023-05-02 | End: 2023-05-23
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
## 2023-05-23 RX ORDER — DULAGLUTIDE 0.75 MG/.5ML
INJECTION, SOLUTION SUBCUTANEOUS
Qty: 2 ML | Refills: 11 | Status: SHIPPED | OUTPATIENT
Start: 2023-05-23 | End: 2023-06-06

## 2023-06-06 ENCOUNTER — NON-PROVIDER VISIT (OUTPATIENT)
Dept: MEDICAL GROUP | Facility: PHYSICIAN GROUP | Age: 68
End: 2023-06-06
Payer: MEDICARE

## 2023-06-06 DIAGNOSIS — Z79.4 TYPE 2 DIABETES MELLITUS WITHOUT COMPLICATION, WITH LONG-TERM CURRENT USE OF INSULIN (HCC): ICD-10-CM

## 2023-06-06 DIAGNOSIS — E11.9 TYPE 2 DIABETES MELLITUS WITHOUT COMPLICATION, WITH LONG-TERM CURRENT USE OF INSULIN (HCC): ICD-10-CM

## 2023-06-06 PROCEDURE — 99211 OFF/OP EST MAY X REQ PHY/QHP: CPT | Performed by: NURSE PRACTITIONER

## 2023-06-06 RX ORDER — DULAGLUTIDE 1.5 MG/.5ML
1 INJECTION, SOLUTION SUBCUTANEOUS
Qty: 6.72 ML | Refills: 1 | Status: SHIPPED | OUTPATIENT
Start: 2023-06-06 | End: 2023-08-17
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
## 2023-06-12 ENCOUNTER — HOSPITAL ENCOUNTER (OUTPATIENT)
Dept: LAB | Facility: MEDICAL CENTER | Age: 68
End: 2023-06-12
Attending: NURSE PRACTITIONER
Payer: MEDICARE

## 2023-06-12 DIAGNOSIS — E11.9 TYPE 2 DIABETES MELLITUS WITHOUT COMPLICATION, WITH LONG-TERM CURRENT USE OF INSULIN (HCC): ICD-10-CM

## 2023-06-12 DIAGNOSIS — Z79.4 TYPE 2 DIABETES MELLITUS WITHOUT COMPLICATION, WITH LONG-TERM CURRENT USE OF INSULIN (HCC): ICD-10-CM

## 2023-06-12 LAB
ALBUMIN SERPL BCP-MCNC: 4.4 G/DL (ref 3.2–4.9)
ALBUMIN/GLOB SERPL: 1.8 G/DL
ALP SERPL-CCNC: 67 U/L (ref 30–99)
ALT SERPL-CCNC: 29 U/L (ref 2–50)
ANION GAP SERPL CALC-SCNC: 12 MMOL/L (ref 7–16)
AST SERPL-CCNC: 17 U/L (ref 12–45)
BILIRUB SERPL-MCNC: 0.5 MG/DL (ref 0.1–1.5)
BUN SERPL-MCNC: 16 MG/DL (ref 8–22)
CALCIUM ALBUM COR SERPL-MCNC: 9.6 MG/DL (ref 8.5–10.5)
CALCIUM SERPL-MCNC: 9.9 MG/DL (ref 8.5–10.5)
CHLORIDE SERPL-SCNC: 103 MMOL/L (ref 96–112)
CHOLEST SERPL-MCNC: 111 MG/DL (ref 100–199)
CO2 SERPL-SCNC: 26 MMOL/L (ref 20–33)
CREAT SERPL-MCNC: 1 MG/DL (ref 0.5–1.4)
CREAT UR-MCNC: 94.11 MG/DL
EST. AVERAGE GLUCOSE BLD GHB EST-MCNC: 189 MG/DL
GFR SERPLBLD CREATININE-BSD FMLA CKD-EPI: 82 ML/MIN/1.73 M 2
GLOBULIN SER CALC-MCNC: 2.5 G/DL (ref 1.9–3.5)
GLUCOSE SERPL-MCNC: 189 MG/DL (ref 65–99)
HBA1C MFR BLD: 8.2 % (ref 4–5.6)
HDLC SERPL-MCNC: 27 MG/DL
LDLC SERPL CALC-MCNC: 13 MG/DL
MICROALBUMIN UR-MCNC: <1.2 MG/DL
MICROALBUMIN/CREAT UR: NORMAL MG/G (ref 0–30)
POTASSIUM SERPL-SCNC: 4.7 MMOL/L (ref 3.6–5.5)
PROT SERPL-MCNC: 6.9 G/DL (ref 6–8.2)
SODIUM SERPL-SCNC: 141 MMOL/L (ref 135–145)
TRIGL SERPL-MCNC: 355 MG/DL (ref 0–149)

## 2023-06-12 PROCEDURE — 36415 COLL VENOUS BLD VENIPUNCTURE: CPT | Mod: GA

## 2023-06-12 PROCEDURE — 83036 HEMOGLOBIN GLYCOSYLATED A1C: CPT | Mod: GA

## 2023-06-12 PROCEDURE — 80053 COMPREHEN METABOLIC PANEL: CPT

## 2023-06-12 PROCEDURE — 80061 LIPID PANEL: CPT

## 2023-06-12 PROCEDURE — 82570 ASSAY OF URINE CREATININE: CPT

## 2023-06-12 PROCEDURE — 82043 UR ALBUMIN QUANTITATIVE: CPT

## 2023-06-14 ENCOUNTER — OFFICE VISIT (OUTPATIENT)
Dept: MEDICAL GROUP | Facility: PHYSICIAN GROUP | Age: 68
End: 2023-06-14
Payer: MEDICARE

## 2023-06-14 VITALS
TEMPERATURE: 96.8 F | RESPIRATION RATE: 16 BRPM | SYSTOLIC BLOOD PRESSURE: 116 MMHG | DIASTOLIC BLOOD PRESSURE: 74 MMHG | HEIGHT: 67 IN | HEART RATE: 68 BPM | WEIGHT: 218 LBS | OXYGEN SATURATION: 95 % | BODY MASS INDEX: 34.21 KG/M2

## 2023-06-14 DIAGNOSIS — Z79.4 TYPE 2 DIABETES MELLITUS WITHOUT COMPLICATION, WITH LONG-TERM CURRENT USE OF INSULIN (HCC): ICD-10-CM

## 2023-06-14 DIAGNOSIS — E78.2 MIXED HYPERLIPIDEMIA: ICD-10-CM

## 2023-06-14 DIAGNOSIS — E11.9 TYPE 2 DIABETES MELLITUS WITHOUT COMPLICATION, WITH LONG-TERM CURRENT USE OF INSULIN (HCC): ICD-10-CM

## 2023-06-14 PROCEDURE — 99214 OFFICE O/P EST MOD 30 MIN: CPT | Performed by: NURSE PRACTITIONER

## 2023-06-14 PROCEDURE — 3074F SYST BP LT 130 MM HG: CPT | Performed by: NURSE PRACTITIONER

## 2023-06-14 PROCEDURE — 3078F DIAST BP <80 MM HG: CPT | Performed by: NURSE PRACTITIONER

## 2023-06-14 ASSESSMENT — ENCOUNTER SYMPTOMS
SHORTNESS OF BREATH: 0
GASTROINTESTINAL NEGATIVE: 1
PALPITATIONS: 0
FEVER: 0
COUGH: 0
CONSTITUTIONAL NEGATIVE: 1
NEUROLOGICAL NEGATIVE: 1
SPUTUM PRODUCTION: 0
MUSCULOSKELETAL NEGATIVE: 1

## 2023-06-14 NOTE — ASSESSMENT & PLAN NOTE
Latest Reference Range & Units 06/06/22 06:39 06/12/23 06:16   Cholesterol,Tot 100 - 199 mg/dL 109 111   Triglycerides 0 - 149 mg/dL 210 (H) 355 (H)   HDL >=40 mg/dL 31 ! 27 !   LDL <100 mg/dL 36 13     Chronic; LDL goal < 100; diabetes mellitus type 2 mgt not optimized and he is seeing pharm for med changes. We will recheck lipid panel in 3-6 months, consider reducing crestor with LDL at 13 & adding fenofibrate for elevated trig   - continue Rosuvastatin 20mg QHS with Co Q10; takes metamucil several days each week
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
## 2023-06-14 NOTE — LETTER
[employer]  JEREMIAH RuizP.  [address]  [place] [address]  Fax: [phone]   Authorization for Release/Disclosure of   Protected Health Information   Name: JOHN REYES : 1955 SSN: [id number]   Address: [address] [facility] [address] Phone:    [phone] (home) [phone] (work)   I authorize the entity listed below to release/disclose the PHI below to:   [facility]/Denisse Mata D.N.P. and Denisse Mata D.N.P.   Provider or Entity Name:  [facility]   Address   City, [facility], Zip  [address] [place] [address] Phone:  [phone]    Fax:     Reason for request: continuity of care   Information to be released:    [  ] LAST COLONOSCOPY,  including any PATH REPORT and follow-up  [  ] LAST FIT/COLOGUARD RESULT [  ] LAST DEXA  [  ] LAST MAMMOGRAM  [  ] LAST PAP  [  ] LAST LABS [  ] RETINA EXAM REPORT  [  ] IMMUNIZATION RECORDS  [  ] Release all info      [  ] Check here and initial the line next to each item to release ALL health information INCLUDING  _____ Care and treatment for drug and / or alcohol abuse  _____ HIV testing, infection status, or AIDS  _____ Genetic Testing    DATES OF SERVICE OR TIME PERIOD TO BE DISCLOSED: _____________  I understand and acknowledge that:  * This Authorization may be revoked at any time by you in writing, except if your health information has already been used or disclosed.  * Your health information that will be used or disclosed as a result of you signing this authorization could be re-disclosed by the recipient. If this occurs, your re-disclosed health information may no longer be protected by State or Federal laws.  * You may refuse to sign this Authorization. Your refusal will not affect your ability to obtain treatment.  * This Authorization becomes effective upon signing and will  on (date) __________.      If no date is indicated, this Authorization will  one (1) year from the signature date.    Name: Radha  Reyes  Signature: Date:   6/14/2023     PLEASE FAX REQUESTED RECORDS BACK TO: [phone]

## 2023-06-14 NOTE — ASSESSMENT & PLAN NOTE
Chronic; goal A1C < 6.5  A1C 8.2 on 6/12/2023 (up from 7.8); he misunderstood instructions with med changes but is now back on lantus since last visit in 6/6/2023; continue plan per Sancho/pharm  - glipizide d/imani 5/2/2023  - trulicity 0.75mg added 5/2/2023; tolerating and increased to 1.5mg on 6/6/2023.   - continue  Metformin 1000mg BID, Jardiance 10mg QD; Lantus 10 units QHS; may continue oral cinnamon as well if he sees improvement in fasting BG  - recheck A1C in 3 months; next appointment with pharm on 7/18/2023

## 2023-06-26 ENCOUNTER — PATIENT MESSAGE (OUTPATIENT)
Dept: MEDICAL GROUP | Facility: PHYSICIAN GROUP | Age: 68
End: 2023-06-26
Payer: MEDICARE

## 2023-06-26 DIAGNOSIS — Z79.4 TYPE 2 DIABETES MELLITUS WITHOUT COMPLICATION, WITH LONG-TERM CURRENT USE OF INSULIN (HCC): ICD-10-CM

## 2023-06-26 DIAGNOSIS — E11.9 TYPE 2 DIABETES MELLITUS WITHOUT COMPLICATION, WITH LONG-TERM CURRENT USE OF INSULIN (HCC): ICD-10-CM

## 2023-06-26 NOTE — TELEPHONE ENCOUNTER
Received request via: Patient    Was the patient seen in the last year in this department? Yes    Does the patient have an active prescription (recently filled or refills available) for medication(s) requested? No    Does the patient have [facility] and need 100 day supply (blood pressure, diabetes and cholesterol meds only)? Patient does not have SCP    Asaf is requesting 7 pills for his local pharmacy until his [facility] script comes in.

## 2023-06-27 DIAGNOSIS — E11.9 TYPE 2 DIABETES MELLITUS WITHOUT COMPLICATION, WITH LONG-TERM CURRENT USE OF INSULIN (HCC): ICD-10-CM

## 2023-06-27 DIAGNOSIS — Z79.4 TYPE 2 DIABETES MELLITUS WITHOUT COMPLICATION, WITH LONG-TERM CURRENT USE OF INSULIN (HCC): ICD-10-CM

## 2023-06-27 NOTE — TELEPHONE ENCOUNTER
VOICEMAIL  1. Caller Name: John Reyes                        Call Back Number: [phone] (home) [phone] (work)    2. Message: Patient has refills from [facility] coming next week. However patient is almost out and needs emergency refill sent to [facility] at least for 1 week. Please advise.    3. Patient approves office to leave a detailed voicemail/MyChart message: yes

## 2023-06-27 NOTE — PROGRESS NOTES
Requested Prescriptions     Signed Prescriptions Disp Refills    metformin (GLUCOPHAGE) 1000 MG tablet 14 Tablet 0     Sig: TAKE 1 TABLET BY MOUTH  TWICE DAILY WITH MEALS     Authorizing Provider: JAMES VELEZ not in office. Patient requesting 7 day refill to local pharmacy until mail order arrives.     ZARIA Reid.

## 2023-07-18 ENCOUNTER — NON-PROVIDER VISIT (OUTPATIENT)
Dept: MEDICAL GROUP | Facility: PHYSICIAN GROUP | Age: 68
End: 2023-07-18
Payer: MEDICARE

## 2023-07-18 PROCEDURE — 99211 OFF/OP EST MAY X REQ PHY/QHP: CPT | Performed by: NURSE PRACTITIONER
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
## 2023-08-15 ENCOUNTER — NON-PROVIDER VISIT (OUTPATIENT)
Dept: MEDICAL GROUP | Facility: PHYSICIAN GROUP | Age: 68
End: 2023-08-15
Payer: MEDICARE

## 2023-08-15 PROCEDURE — 99211 OFF/OP EST MAY X REQ PHY/QHP: CPT | Performed by: STUDENT IN AN ORGANIZED HEALTH CARE EDUCATION/TRAINING PROGRAM
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
## 2023-08-17 RX ORDER — DULAGLUTIDE 3 MG/.5ML
1 INJECTION, SOLUTION SUBCUTANEOUS
Qty: 6 ML | Refills: 1 | Status: SHIPPED | OUTPATIENT
Start: 2023-08-17

## 2023-08-17 RX ORDER — EMPAGLIFLOZIN 25 MG/1
1 TABLET, FILM COATED ORAL DAILY
Qty: 90 TABLET | Refills: 1 | Status: SHIPPED | OUTPATIENT
Start: 2023-08-17 | End: 2024-01-03

## 2023-09-14 ENCOUNTER — OFFICE VISIT (OUTPATIENT)
Dept: MEDICAL GROUP | Facility: PHYSICIAN GROUP | Age: 68
End: 2023-09-14
Payer: MEDICARE

## 2023-09-14 VITALS
SYSTOLIC BLOOD PRESSURE: 120 MMHG | BODY MASS INDEX: 33.12 KG/M2 | RESPIRATION RATE: 16 BRPM | OXYGEN SATURATION: 98 % | WEIGHT: 211 LBS | HEIGHT: 67 IN | HEART RATE: 67 BPM | DIASTOLIC BLOOD PRESSURE: 70 MMHG | TEMPERATURE: 96.5 F

## 2023-09-14 DIAGNOSIS — Z79.4 TYPE 2 DIABETES MELLITUS WITHOUT COMPLICATION, WITH LONG-TERM CURRENT USE OF INSULIN (HCC): ICD-10-CM

## 2023-09-14 DIAGNOSIS — E11.9 TYPE 2 DIABETES MELLITUS WITHOUT COMPLICATION, WITH LONG-TERM CURRENT USE OF INSULIN (HCC): ICD-10-CM

## 2023-09-14 LAB
HBA1C MFR BLD: 7.5 % (ref ?–5.8)
POCT INT CON NEG: NEGATIVE
POCT INT CON POS: POSITIVE

## 2023-09-14 PROCEDURE — 99214 OFFICE O/P EST MOD 30 MIN: CPT | Performed by: NURSE PRACTITIONER

## 2023-09-14 PROCEDURE — 3074F SYST BP LT 130 MM HG: CPT | Performed by: NURSE PRACTITIONER

## 2023-09-14 PROCEDURE — 83036 HEMOGLOBIN GLYCOSYLATED A1C: CPT | Performed by: NURSE PRACTITIONER

## 2023-09-14 PROCEDURE — 3078F DIAST BP <80 MM HG: CPT | Performed by: NURSE PRACTITIONER

## 2023-09-14 ASSESSMENT — ENCOUNTER SYMPTOMS
COUGH: 0
GASTROINTESTINAL NEGATIVE: 1
SPUTUM PRODUCTION: 0
CONSTITUTIONAL NEGATIVE: 1
MUSCULOSKELETAL NEGATIVE: 1
NEUROLOGICAL NEGATIVE: 1
PALPITATIONS: 0
FEVER: 0
SHORTNESS OF BREATH: 0
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
## 2023-09-15 NOTE — ASSESSMENT & PLAN NOTE
Chronic; goal A1C < 6.5  A1C 7.5 today in clinic (down from 8.2); continue plan per Sancho/pharm; he is walking around the [employer] for exercise. asting BG yesterday morning 117, this morning 97.   - glipizide d/imani 5/2/2023  - trulicity 0.75mg added 5/2/2023; tolerating and increased to 1.5mg on 6/6/2023; increased to 3mg on 9/7/2023.   - Metformin 1000mg BID  - Jardiance 25mg QD; increased 8/15/2023  - Lantus 26 units QHS; increased 9/10/2023  - may continue oral cinnamon as well if he sees improvement in fasting BG  - recheck A1C in 3 months; next appointment with pharm on 9/26/2023

## 2023-09-26 ENCOUNTER — NON-PROVIDER VISIT (OUTPATIENT)
Dept: MEDICAL GROUP | Facility: PHYSICIAN GROUP | Age: 68
End: 2023-09-26
Payer: MEDICARE

## 2023-09-26 PROCEDURE — 99211 OFF/OP EST MAY X REQ PHY/QHP: CPT | Performed by: NURSE PRACTITIONER
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
## 2023-10-07 ENCOUNTER — APPOINTMENT (OUTPATIENT)
Dept: RADIOLOGY | Facility: MEDICAL CENTER | Age: 68
End: 2023-10-07
Attending: EMERGENCY MEDICINE
Payer: MEDICARE

## 2023-10-07 ENCOUNTER — APPOINTMENT (OUTPATIENT)
Dept: URGENT CARE | Facility: PHYSICIAN GROUP | Age: 68
End: 2023-10-07

## 2023-10-07 ENCOUNTER — HOSPITAL ENCOUNTER (OUTPATIENT)
Facility: MEDICAL CENTER | Age: 68
End: 2023-10-09
Attending: EMERGENCY MEDICINE | Admitting: INTERNAL MEDICINE
Payer: MEDICARE

## 2023-10-07 DIAGNOSIS — E11.9 TYPE 2 DIABETES MELLITUS WITHOUT COMPLICATION, WITH LONG-TERM CURRENT USE OF INSULIN (HCC): ICD-10-CM

## 2023-10-07 DIAGNOSIS — R94.31 ABNORMAL EKG: ICD-10-CM

## 2023-10-07 DIAGNOSIS — R55 SYNCOPE, UNSPECIFIED SYNCOPE TYPE: ICD-10-CM

## 2023-10-07 DIAGNOSIS — Z79.4 TYPE 2 DIABETES MELLITUS WITHOUT COMPLICATION, WITH LONG-TERM CURRENT USE OF INSULIN (HCC): ICD-10-CM

## 2023-10-07 PROBLEM — D72.823 LEUKEMOID REACTION: Status: ACTIVE | Noted: 2023-10-07

## 2023-10-07 PROBLEM — N52.9 ERECTILE DISORDER: Status: ACTIVE | Noted: 2023-10-07

## 2023-10-07 LAB
ALBUMIN SERPL BCP-MCNC: 4.4 G/DL (ref 3.2–4.9)
ALBUMIN/GLOB SERPL: 1.8 G/DL
ALP SERPL-CCNC: 48 U/L (ref 30–99)
ALT SERPL-CCNC: 32 U/L (ref 2–50)
ANION GAP SERPL CALC-SCNC: 13 MMOL/L (ref 7–16)
AST SERPL-CCNC: 26 U/L (ref 12–45)
BASOPHILS # BLD AUTO: 0.5 % (ref 0–1.8)
BASOPHILS # BLD: 0.06 K/UL (ref 0–0.12)
BILIRUB SERPL-MCNC: 0.6 MG/DL (ref 0.1–1.5)
BUN SERPL-MCNC: 20 MG/DL (ref 8–22)
CALCIUM ALBUM COR SERPL-MCNC: 9.4 MG/DL (ref 8.5–10.5)
CALCIUM SERPL-MCNC: 9.7 MG/DL (ref 8.5–10.5)
CHLORIDE SERPL-SCNC: 106 MMOL/L (ref 96–112)
CO2 SERPL-SCNC: 24 MMOL/L (ref 20–33)
CREAT SERPL-MCNC: 1.47 MG/DL (ref 0.5–1.4)
EKG IMPRESSION: NORMAL
EOSINOPHIL # BLD AUTO: 0.1 K/UL (ref 0–0.51)
EOSINOPHIL NFR BLD: 0.8 % (ref 0–6.9)
ERYTHROCYTE [DISTWIDTH] IN BLOOD BY AUTOMATED COUNT: 46.3 FL (ref 35.9–50)
GFR SERPLBLD CREATININE-BSD FMLA CKD-EPI: 51 ML/MIN/1.73 M 2
GLOBULIN SER CALC-MCNC: 2.5 G/DL (ref 1.9–3.5)
GLUCOSE BLD STRIP.AUTO-MCNC: 108 MG/DL (ref 65–99)
GLUCOSE SERPL-MCNC: 133 MG/DL (ref 65–99)
HCT VFR BLD AUTO: 48.5 % (ref 42–52)
HGB BLD-MCNC: 16.2 G/DL (ref 14–18)
IMM GRANULOCYTES # BLD AUTO: 0.04 K/UL (ref 0–0.11)
IMM GRANULOCYTES NFR BLD AUTO: 0.3 % (ref 0–0.9)
LYMPHOCYTES # BLD AUTO: 2.03 K/UL (ref 1–4.8)
LYMPHOCYTES NFR BLD: 16.8 % (ref 22–41)
MCH RBC QN AUTO: 30.3 PG (ref 27–33)
MCHC RBC AUTO-ENTMCNC: 33.4 G/DL (ref 32.3–36.5)
MCV RBC AUTO: 90.7 FL (ref 81.4–97.8)
MONOCYTES # BLD AUTO: 0.72 K/UL (ref 0–0.85)
MONOCYTES NFR BLD AUTO: 6 % (ref 0–13.4)
NEUTROPHILS # BLD AUTO: 9.14 K/UL (ref 1.82–7.42)
NEUTROPHILS NFR BLD: 75.6 % (ref 44–72)
NRBC # BLD AUTO: 0 K/UL
NRBC BLD-RTO: 0 /100 WBC (ref 0–0.2)
PLATELET # BLD AUTO: 243 K/UL (ref 164–446)
PMV BLD AUTO: 9.2 FL (ref 9–12.9)
POTASSIUM SERPL-SCNC: 4.8 MMOL/L (ref 3.6–5.5)
PROT SERPL-MCNC: 6.9 G/DL (ref 6–8.2)
RBC # BLD AUTO: 5.35 M/UL (ref 4.7–6.1)
SODIUM SERPL-SCNC: 143 MMOL/L (ref 135–145)
TROPONIN T SERPL-MCNC: 17 NG/L (ref 6–19)
TROPONIN T SERPL-MCNC: 18 NG/L (ref 6–19)
WBC # BLD AUTO: 12.1 K/UL (ref 4.8–10.8)

## 2023-10-07 PROCEDURE — 93005 ELECTROCARDIOGRAM TRACING: CPT

## 2023-10-07 PROCEDURE — 70450 CT HEAD/BRAIN W/O DYE: CPT

## 2023-10-07 PROCEDURE — 80053 COMPREHEN METABOLIC PANEL: CPT

## 2023-10-07 PROCEDURE — 71045 X-RAY EXAM CHEST 1 VIEW: CPT

## 2023-10-07 PROCEDURE — 84484 ASSAY OF TROPONIN QUANT: CPT

## 2023-10-07 PROCEDURE — A9270 NON-COVERED ITEM OR SERVICE: HCPCS | Performed by: INTERNAL MEDICINE

## 2023-10-07 PROCEDURE — 700105 HCHG RX REV CODE 258: Performed by: INTERNAL MEDICINE

## 2023-10-07 PROCEDURE — 99223 1ST HOSP IP/OBS HIGH 75: CPT | Performed by: INTERNAL MEDICINE

## 2023-10-07 PROCEDURE — 36415 COLL VENOUS BLD VENIPUNCTURE: CPT

## 2023-10-07 PROCEDURE — G0378 HOSPITAL OBSERVATION PER HR: HCPCS

## 2023-10-07 PROCEDURE — 99285 EMERGENCY DEPT VISIT HI MDM: CPT

## 2023-10-07 PROCEDURE — 82962 GLUCOSE BLOOD TEST: CPT

## 2023-10-07 PROCEDURE — 700102 HCHG RX REV CODE 250 W/ 637 OVERRIDE(OP): Performed by: INTERNAL MEDICINE

## 2023-10-07 PROCEDURE — 85025 COMPLETE CBC W/AUTO DIFF WBC: CPT

## 2023-10-07 PROCEDURE — 93005 ELECTROCARDIOGRAM TRACING: CPT | Performed by: EMERGENCY MEDICINE

## 2023-10-07 RX ORDER — BISACODYL 10 MG
10 SUPPOSITORY, RECTAL RECTAL
Status: DISCONTINUED | OUTPATIENT
Start: 2023-10-07 | End: 2023-10-09 | Stop reason: HOSPADM

## 2023-10-07 RX ORDER — ACETAMINOPHEN 325 MG/1
650 TABLET ORAL EVERY 6 HOURS PRN
Status: DISCONTINUED | OUTPATIENT
Start: 2023-10-07 | End: 2023-10-07

## 2023-10-07 RX ORDER — AMINOPHYLLINE 25 MG/ML
100 INJECTION, SOLUTION INTRAVENOUS
Status: DISCONTINUED | OUTPATIENT
Start: 2023-10-07 | End: 2023-10-09 | Stop reason: HOSPADM

## 2023-10-07 RX ORDER — ONDANSETRON 2 MG/ML
4 INJECTION INTRAMUSCULAR; INTRAVENOUS EVERY 4 HOURS PRN
Status: DISCONTINUED | OUTPATIENT
Start: 2023-10-07 | End: 2023-10-09 | Stop reason: HOSPADM

## 2023-10-07 RX ORDER — AMOXICILLIN 250 MG
2 CAPSULE ORAL 2 TIMES DAILY
Status: DISCONTINUED | OUTPATIENT
Start: 2023-10-07 | End: 2023-10-09 | Stop reason: HOSPADM

## 2023-10-07 RX ORDER — AMPICILLIN TRIHYDRATE 250 MG
500 CAPSULE ORAL 2 TIMES DAILY
COMMUNITY
End: 2023-11-07

## 2023-10-07 RX ORDER — MAGNESIUM OXIDE 400 MG/1
400 TABLET ORAL DAILY
COMMUNITY

## 2023-10-07 RX ORDER — VITAMIN B COMPLEX
1000 TABLET ORAL DAILY
COMMUNITY
End: 2023-12-19

## 2023-10-07 RX ORDER — EMPAGLIFLOZIN 10 MG/1
20 TABLET, FILM COATED ORAL DAILY
COMMUNITY
End: 2023-11-07

## 2023-10-07 RX ORDER — SODIUM CHLORIDE 9 MG/ML
INJECTION, SOLUTION INTRAVENOUS CONTINUOUS
Status: DISCONTINUED | OUTPATIENT
Start: 2023-10-07 | End: 2023-10-08

## 2023-10-07 RX ORDER — ONDANSETRON 4 MG/1
4 TABLET, ORALLY DISINTEGRATING ORAL EVERY 4 HOURS PRN
Status: DISCONTINUED | OUTPATIENT
Start: 2023-10-07 | End: 2023-10-09 | Stop reason: HOSPADM

## 2023-10-07 RX ORDER — REGADENOSON 0.08 MG/ML
0.4 INJECTION, SOLUTION INTRAVENOUS
Status: COMPLETED | OUTPATIENT
Start: 2023-10-07 | End: 2023-10-09

## 2023-10-07 RX ORDER — ROSUVASTATIN CALCIUM 20 MG/1
20 TABLET, COATED ORAL EVERY EVENING
Status: DISCONTINUED | OUTPATIENT
Start: 2023-10-07 | End: 2023-10-09 | Stop reason: HOSPADM

## 2023-10-07 RX ORDER — POLYETHYLENE GLYCOL 3350 17 G/17G
1 POWDER, FOR SOLUTION ORAL
Status: DISCONTINUED | OUTPATIENT
Start: 2023-10-07 | End: 2023-10-09 | Stop reason: HOSPADM

## 2023-10-07 RX ORDER — ACETAMINOPHEN 325 MG/1
650 TABLET ORAL EVERY 6 HOURS PRN
Status: DISCONTINUED | OUTPATIENT
Start: 2023-10-07 | End: 2023-10-09 | Stop reason: HOSPADM

## 2023-10-07 RX ADMIN — ROSUVASTATIN CALCIUM 20 MG: 20 TABLET, FILM COATED ORAL at 20:37

## 2023-10-07 RX ADMIN — INSULIN GLARGINE-YFGN 26 UNITS: 100 INJECTION, SOLUTION SUBCUTANEOUS at 20:38

## 2023-10-07 RX ADMIN — SODIUM CHLORIDE: 9 INJECTION, SOLUTION INTRAVENOUS at 20:38

## 2023-10-07 RX ADMIN — ACETAMINOPHEN 650 MG: 325 TABLET, FILM COATED ORAL at 20:37

## 2023-10-07 ASSESSMENT — LIFESTYLE VARIABLES
CONSUMPTION TOTAL: NEGATIVE
EVER HAD A DRINK FIRST THING IN THE MORNING TO STEADY YOUR NERVES TO GET RID OF A HANGOVER: NO
TOTAL SCORE: 0
ALCOHOL_USE: YES
TOTAL SCORE: 0
AVERAGE NUMBER OF DAYS PER WEEK YOU HAVE A DRINK CONTAINING ALCOHOL: 1
EVER FELT BAD OR GUILTY ABOUT YOUR DRINKING: NO
HAVE YOU EVER FELT YOU SHOULD CUT DOWN ON YOUR DRINKING: NO
HAVE PEOPLE ANNOYED YOU BY CRITICIZING YOUR DRINKING: NO
HOW MANY TIMES IN THE PAST YEAR HAVE YOU HAD 5 OR MORE DRINKS IN A DAY: 0
ON A TYPICAL DAY WHEN YOU DRINK ALCOHOL HOW MANY DRINKS DO YOU HAVE: 0
TOTAL SCORE: 0

## 2023-10-07 ASSESSMENT — ENCOUNTER SYMPTOMS
CONSTIPATION: 0
LOSS OF CONSCIOUSNESS: 1
SHORTNESS OF BREATH: 0
VOMITING: 0
FALLS: 1
NAUSEA: 0
BACK PAIN: 0
DIZZINESS: 0
CHILLS: 0
ABDOMINAL PAIN: 0
PALPITATIONS: 0
COUGH: 0
DIARRHEA: 0
HEADACHES: 0
FEVER: 0

## 2023-10-07 ASSESSMENT — FIBROSIS 4 INDEX: FIB4 SCORE: 1.29

## 2023-10-07 ASSESSMENT — PATIENT HEALTH QUESTIONNAIRE - PHQ9
1. LITTLE INTEREST OR PLEASURE IN DOING THINGS: NOT AT ALL
SUM OF ALL RESPONSES TO PHQ9 QUESTIONS 1 AND 2: 0
2. FEELING DOWN, DEPRESSED, IRRITABLE, OR HOPELESS: NOT AT ALL

## 2023-10-07 ASSESSMENT — PAIN DESCRIPTION - PAIN TYPE
TYPE: ACUTE PAIN
TYPE: ACUTE PAIN

## 2023-10-07 NOTE — ED TRIAGE NOTES
Chief Complaint   Patient presents with    Syncope     The pt reports dizziness that started while doing yard work this morning. The pt had a syncope while sitting in yard. Negative trauma/head strike. LOC was several seconds.        Pt ambulatory to triage. Pt A&Ox4, for the above complaint.     Pt to lobby . Pt educated on alerting staff in changes to condition. Pt verbalized understanding. Protocol ECG ordered.     /69   Pulse 95   Temp 36.2 °C (97.2 °F) (Temporal)   Resp 16   SpO2 96%

## 2023-10-08 ENCOUNTER — APPOINTMENT (OUTPATIENT)
Dept: CARDIOLOGY | Facility: MEDICAL CENTER | Age: 68
End: 2023-10-08
Attending: INTERNAL MEDICINE
Payer: MEDICARE

## 2023-10-08 ENCOUNTER — APPOINTMENT (OUTPATIENT)
Dept: RADIOLOGY | Facility: MEDICAL CENTER | Age: 68
End: 2023-10-08
Attending: INTERNAL MEDICINE
Payer: MEDICARE

## 2023-10-08 LAB
ANION GAP SERPL CALC-SCNC: 11 MMOL/L (ref 7–16)
BUN SERPL-MCNC: 17 MG/DL (ref 8–22)
CALCIUM SERPL-MCNC: 8.9 MG/DL (ref 8.5–10.5)
CHLORIDE SERPL-SCNC: 104 MMOL/L (ref 96–112)
CO2 SERPL-SCNC: 25 MMOL/L (ref 20–33)
CREAT SERPL-MCNC: 1.16 MG/DL (ref 0.5–1.4)
ERYTHROCYTE [DISTWIDTH] IN BLOOD BY AUTOMATED COUNT: 47.3 FL (ref 35.9–50)
GFR SERPLBLD CREATININE-BSD FMLA CKD-EPI: 68 ML/MIN/1.73 M 2
GLUCOSE BLD STRIP.AUTO-MCNC: 137 MG/DL (ref 65–99)
GLUCOSE BLD STRIP.AUTO-MCNC: 145 MG/DL (ref 65–99)
GLUCOSE SERPL-MCNC: 107 MG/DL (ref 65–99)
HCT VFR BLD AUTO: 44.3 % (ref 42–52)
HGB BLD-MCNC: 15.1 G/DL (ref 14–18)
LV EJECT FRACT  99904: 60
LV EJECT FRACT MOD 2C 99903: 63.33
LV EJECT FRACT MOD 4C 99902: 55.45
LV EJECT FRACT MOD BP 99901: 59.57
MAGNESIUM SERPL-MCNC: 2 MG/DL (ref 1.5–2.5)
MCH RBC QN AUTO: 31.1 PG (ref 27–33)
MCHC RBC AUTO-ENTMCNC: 34.1 G/DL (ref 32.3–36.5)
MCV RBC AUTO: 91.3 FL (ref 81.4–97.8)
PLATELET # BLD AUTO: 226 K/UL (ref 164–446)
PMV BLD AUTO: 9.6 FL (ref 9–12.9)
POTASSIUM SERPL-SCNC: 3.8 MMOL/L (ref 3.6–5.5)
RBC # BLD AUTO: 4.85 M/UL (ref 4.7–6.1)
SODIUM SERPL-SCNC: 140 MMOL/L (ref 135–145)
TROPONIN T SERPL-MCNC: 16 NG/L (ref 6–19)
WBC # BLD AUTO: 10.5 K/UL (ref 4.8–10.8)

## 2023-10-08 PROCEDURE — 96372 THER/PROPH/DIAG INJ SC/IM: CPT | Mod: XU

## 2023-10-08 PROCEDURE — 80048 BASIC METABOLIC PNL TOTAL CA: CPT

## 2023-10-08 PROCEDURE — 93306 TTE W/DOPPLER COMPLETE: CPT

## 2023-10-08 PROCEDURE — 99232 SBSQ HOSP IP/OBS MODERATE 35: CPT | Performed by: HOSPITALIST

## 2023-10-08 PROCEDURE — 82962 GLUCOSE BLOOD TEST: CPT

## 2023-10-08 PROCEDURE — 700105 HCHG RX REV CODE 258: Performed by: INTERNAL MEDICINE

## 2023-10-08 PROCEDURE — G0378 HOSPITAL OBSERVATION PER HR: HCPCS

## 2023-10-08 PROCEDURE — 93306 TTE W/DOPPLER COMPLETE: CPT | Mod: 26 | Performed by: INTERNAL MEDICINE

## 2023-10-08 PROCEDURE — 700102 HCHG RX REV CODE 250 W/ 637 OVERRIDE(OP): Performed by: INTERNAL MEDICINE

## 2023-10-08 PROCEDURE — A9270 NON-COVERED ITEM OR SERVICE: HCPCS | Performed by: INTERNAL MEDICINE

## 2023-10-08 PROCEDURE — 85027 COMPLETE CBC AUTOMATED: CPT

## 2023-10-08 PROCEDURE — 83735 ASSAY OF MAGNESIUM: CPT

## 2023-10-08 PROCEDURE — 84484 ASSAY OF TROPONIN QUANT: CPT

## 2023-10-08 RX ORDER — DEXTROSE MONOHYDRATE 25 G/50ML
25 INJECTION, SOLUTION INTRAVENOUS
Status: DISCONTINUED | OUTPATIENT
Start: 2023-10-08 | End: 2023-10-09 | Stop reason: HOSPADM

## 2023-10-08 RX ORDER — INSULIN LISPRO 100 [IU]/ML
1-6 INJECTION, SOLUTION INTRAVENOUS; SUBCUTANEOUS EVERY 6 HOURS
Status: DISCONTINUED | OUTPATIENT
Start: 2023-10-08 | End: 2023-10-09 | Stop reason: HOSPADM

## 2023-10-08 RX ADMIN — SODIUM CHLORIDE: 9 INJECTION, SOLUTION INTRAVENOUS at 05:12

## 2023-10-08 RX ADMIN — ACETAMINOPHEN 650 MG: 325 TABLET, FILM COATED ORAL at 17:17

## 2023-10-08 RX ADMIN — ACETAMINOPHEN 650 MG: 325 TABLET, FILM COATED ORAL at 09:04

## 2023-10-08 RX ADMIN — DOCUSATE SODIUM 50 MG AND SENNOSIDES 8.6 MG 2 TABLET: 8.6; 5 TABLET, FILM COATED ORAL at 17:17

## 2023-10-08 RX ADMIN — ROSUVASTATIN CALCIUM 20 MG: 20 TABLET, FILM COATED ORAL at 17:17

## 2023-10-08 ASSESSMENT — ENCOUNTER SYMPTOMS
ORTHOPNEA: 0
MYALGIAS: 0
WEIGHT LOSS: 0
PALPITATIONS: 0
DIZZINESS: 0
SPUTUM PRODUCTION: 0
PHOTOPHOBIA: 0
DEPRESSION: 0
BLURRED VISION: 0
NECK PAIN: 0
SENSORY CHANGE: 0
VOMITING: 0
TREMORS: 0
DOUBLE VISION: 0
FEVER: 0
TINGLING: 0
HEMOPTYSIS: 0
COUGH: 0
HEADACHES: 0
EYE PAIN: 0
NAUSEA: 0
HEARTBURN: 0
ABDOMINAL PAIN: 0

## 2023-10-08 ASSESSMENT — PAIN DESCRIPTION - PAIN TYPE: TYPE: ACUTE PAIN

## 2023-10-08 ASSESSMENT — LIFESTYLE VARIABLES: SUBSTANCE_ABUSE: 0

## 2023-10-08 NOTE — ED NOTES
Med rec updated and complete. Allergies reviewed.   Confirmed name and date of  birth.    Pt denies antibiotic use , antiplatelet medication and anticoagulant medication.  No infusions.  Pt took no medications today.  Pt takes a daily dose of cialis  because of his prostrate problems.      Home pharmacy    LONG TERM   [facility]  [phone]    SHORT TERM   [facility] = [phone]

## 2023-10-08 NOTE — ASSESSMENT & PLAN NOTE
Hold home oral medications metformin and Jardiance.  Hold home Trulicity.  Continue patient on insulin sliding scale, glargine 10 units.  Monitor for hypoglycemia.  Continue Accu-Cheks.  Last A1c 7.5 on 9/14/2023.  Diabetes control improved compared to June.

## 2023-10-08 NOTE — ASSESSMENT & PLAN NOTE
Patient was doing yard work this morning, became dizzy and sat down where he had a syncopal event.  Reported syncope for seconds, hit head.  Pt had low BP coming to ED, with DOUG which indicates dehydration.  Pt did take Cialis 4 pills last night.  -- I suspect patient has orthostatic hypotension (takes lisinopril, dehydrated from Trulicity/Jardiance, forgets to drink water, took Cialis) resulting in a vasovagal event. Will admit and rule out cardiac cause.  Patient is not orthostatic  IV fluids discontinued after 24 hours   Lexiscan stress test and Echo  -

## 2023-10-08 NOTE — ED NOTES
Bedside report received from off going RN Yoly, assumed care of patient.  POC discussed with patient. Call light within reach, all needs addressed at this time.       Fall risk interventions in place: Patient's personal possessions are with in their safe reach, Place fall risk sign on patient's door, Keep floor surfaces clean and dry, and Accompanied to restroom (all applicable per [facility] Fall risk assessment)   Continuous monitoring: Not Applicable   IVF/IV medications: Not Applicable   Oxygen: Room Air  Bedside sitter: Not Applicable   Isolation: Not Applicable

## 2023-10-08 NOTE — H&P
Hospital Medicine History & Physical Note    Date of Service  10/7/2023    Primary Care Physician  Denisse Mata D.N.P.    Consultants  None    Code Status  Full Code    Chief Complaint  Chief Complaint   Patient presents with    Syncope     The pt reports dizziness that started while doing yard work this morning. The pt had a syncope while sitting in yard. Negative trauma/head strike. LOC was several seconds.        History of Presenting Illness  John Reyes is a 68 y.o. male who presented 10/7/2023 with fainting at home. He has a past medical history of T2DM on insulin at home, A1c 7.5, hyperlipidemia, hypertension.  Patient was doing yard work this morning, became dizzy and sat down where he had a syncopal event.  Reported syncope for seconds, fell onto his knees first, then hit head.  He took 4 Cialis pills last night, was told by his urologist and clinical pharmacist it was okay. He states he wasn't drinking enough water recently, and has been working outside more, while continuing his BP meds and diabetes medications.    In the ED patient's blood pressure is 102/69 arrival.  He had leukocytosis 12.1, DOUG with wall creatinine 1.47.    I reviewed prior echocardiogram which showed LVEF 65%, RVSP 20 mmHg, trace tricuspid regurg 6/9/21    I discussed the plan of care with patient, bedside RN, charge RN, , and pharmacy.    Review of Systems  Review of Systems   Constitutional:  Negative for chills, fever and malaise/fatigue.   Respiratory:  Negative for cough and shortness of breath.    Cardiovascular:  Negative for chest pain and palpitations.   Gastrointestinal:  Negative for abdominal pain, constipation, diarrhea, nausea and vomiting.   Musculoskeletal:  Positive for falls. Negative for back pain and joint pain.   Neurological:  Positive for loss of consciousness. Negative for dizziness and headaches.   All other systems reviewed and are negative.      Past Medical History   has a past medical history  of Arrhythmia (05/13/2021), Arthritis (05/13/2021), BPH (benign prostatic hyperplasia) (3/11/2010), Bronchitis, Bulging lumbar disc, Cataract (05/13/2021), DDD (degenerative disc disease), cervical, Depression (11/4/2009), Diabetes, DIABETES MELLITUS (11/4/2009), Elevated liver enzymes (3/11/2010), Hyperlipidemia (11/4/2009), Hypertension, Lumbosacral disc disease, Pneumonia, Psychiatric disorder, Scarring of lung, and Torn meniscus (05/13/2021).    Surgical History   has a past surgical history that includes tonsillectomy; rotator cuff repair; eye surgery; cholecystectomy; biceps tendon repair; and prostatectomy, simple, robot-assisted, using da mana xi (6/1/2021).     Family History  family history includes Cancer in his mother; Diabetes in his father.   Family history reviewed with patient. There is no family history that is pertinent to the chief complaint.     Social History   reports that he has never smoked. He has never used smokeless tobacco. He reports current alcohol use. He reports that he does not use drugs.    Allergies  Allergies   Allergen Reactions    Sulfa Drugs Rash     Red man syndrome       Septra [Bactrim Ds] Rash     Per pt. Doesn't remember          Medications  Prior to Admission Medications   Prescriptions Last Dose Informant Patient Reported? Taking?   B-D UF III MINI PEN NEEDLES 31G X 5 MM Misc   No No   Sig: USE 1 PEN NEEDLE DAILY   CINNAMON PO  Patient Yes No   Sig: Take 1 tablet by mouth 2 times a day.   Cholecalciferol (VITAMIN D3 PO)  Patient Yes No   Sig: Take 1 tablet by mouth every day.   Coenzyme Q10 (CO Q 10 PO)  Patient Yes No   Sig: Take 1 tablet by mouth every day.   Dulaglutide (TRULICITY) 3 MG/0.5ML Solution Pen-injector   No No   Sig: Inject 1 Pen under the skin every 7 days.   Empagliflozin (JARDIANCE) 25 MG Tab   No No   Sig: Take 1 Tablet by mouth every day.   MAGNESIUM CARBONATE PO   Yes No   Sig: Take  by mouth.   Menthol, Topical Analgesic, (BIOFREEZE EX)  Patient  Yes No   Sig: Apply 1 Application topically every day. Both knees   Multiple Vitamin (MULTI-VITAMIN DAILY PO)  Patient Yes No   Sig: Take 1 tablet by mouth every day.   ONETOUCH ULTRA strip   No No   Sig: USE TO TEST BLOOD SUGAR AS  NEEDED   Probiotic Product (PROBIOMAX PLUS DF PO)   Yes No   Specialty Vitamins Products (COLLAGEN ULTRA PO)   Yes No   VITAMIN K PO  Patient Yes No   Sig: Take 1 tablet by mouth every day.   insulin glargine (LANTUS SOLOSTAR) 100 UNIT/ML Solution Pen-injector injection   No No   Sig: Inject 10 Units under the skin every evening.   lisinopril (PRINIVIL) 10 MG Tab   No No   Sig: Take 1 Tablet by mouth every day.   metformin (GLUCOPHAGE) 1000 MG tablet   No No   Sig: Take 1 Tablet by mouth 2 times a day with meals.   rosuvastatin (CRESTOR) 20 MG Tab   No No   Sig: TAKE 1 TABLET BY MOUTH IN  THE EVENING   tadalafil (CIALIS) 5 MG tablet  Patient Yes No   Sig: Take 1 Tablet by mouth every evening.      Facility-Administered Medications: None       Physical Exam  Temp:  [36.2 °C (97.2 °F)] 36.2 °C (97.2 °F)  Pulse:  [70-95] 70  Resp:  [16-18] 17  BP: (102-123)/(68-84) 112/71  SpO2:  [92 %-98 %] 92 %  Blood Pressure : 121/84   Temperature: 36.2 °C (97.2 °F)   Pulse: 73   Respiration: 18   Pulse Oximetry: 93 %       Physical Exam  Vitals and nursing note reviewed.   Constitutional:       General: He is not in acute distress.     Appearance: He is not diaphoretic.   HENT:      Head: Normocephalic and atraumatic.      Nose: Nose normal. No congestion.      Mouth/Throat:      Mouth: Mucous membranes are dry.      Pharynx: No oropharyngeal exudate.   Eyes:      General: No scleral icterus.     Extraocular Movements: Extraocular movements intact.   Cardiovascular:      Rate and Rhythm: Normal rate and regular rhythm.      Pulses: Normal pulses.      Heart sounds: Normal heart sounds. No murmur heard.  Pulmonary:      Effort: Pulmonary effort is normal. No respiratory distress.      Breath sounds:  "Normal breath sounds. No wheezing or rales.   Abdominal:      General: Abdomen is flat. Bowel sounds are normal. There is no distension.      Palpations: Abdomen is soft.      Tenderness: There is no abdominal tenderness.   Musculoskeletal:         General: No swelling or tenderness. Normal range of motion.      Cervical back: Normal range of motion and neck supple.   Skin:     General: Skin is warm.      Capillary Refill: Capillary refill takes less than 2 seconds.      Coloration: Skin is not jaundiced or pale.      Findings: No erythema.      Comments: Abrasion to B/L knees   Neurological:      General: No focal deficit present.      Mental Status: He is alert and oriented to person, place, and time. Mental status is at baseline.      Motor: No weakness.   Psychiatric:         Mood and Affect: Mood normal.         Behavior: Behavior normal.         Thought Content: Thought content normal.         Judgment: Judgment normal.         Laboratory:  Recent Labs     10/07/23  1759   WBC 12.1*   RBC 5.35   HEMOGLOBIN 16.2   HEMATOCRIT 48.5   MCV 90.7   MCH 30.3   MCHC 33.4   RDW 46.3   PLATELETCT 243   MPV 9.2     Recent Labs     10/07/23  1759   SODIUM 143   POTASSIUM 4.8   CHLORIDE 106   CO2 24   GLUCOSE 133*   BUN 20   CREATININE 1.47*   CALCIUM 9.7     Recent Labs     10/07/23  1759   ALTSGPT 32   ASTSGOT 26   ALKPHOSPHAT 48   TBILIRUBIN 0.6   GLUCOSE 133*         No results for input(s): \"NTPROBNP\" in the last 72 hours.      Recent Labs     10/07/23  1759   TROPONINT 18       Imaging:  CT-HEAD W/O   Final Result         1. No acute intracranial abnormality. No evidence of acute intracranial hemorrhage or mass lesion.                     DX-CHEST-PORTABLE (1 VIEW)   Final Result         1. No acute cardiopulmonary abnormalities are identified.      NM-CARDIAC STRESS TEST    (Results Pending)   EC-ECHOCARDIOGRAM COMPLETE W/O CONT    (Results Pending)       X-Ray:  I have personally reviewed the images and compared " with prior images.  EKG:  My impression is: HR 93, Qtc 433ms, subtle changes to ECG compared to prior.    Assessment/Plan:  Justification for Admission Status  I anticipate this patient is appropriate for observation status at this time because rule out ACS, monitor on telemetry for syncope.    Patient will need a Telemetry bed on MEDICAL service .  The need is secondary to syncope with collapse.    * Syncope and collapse- (present on admission)  Assessment & Plan  Patient was doing yard work this morning, became dizzy and sat down where he had a syncopal event.  Reported syncope for seconds, hit head.  Pt had low BP coming to ED, with DOUG which indicates dehydration.  Pt did take Cialis 4 pills last night.  -- I suspect patient has orthostatic hypotension (takes lisinopril, dehydrated from Trulicity/Jardiance, forgets to drink water, took Cialis) resulting in a vasovagal event. Will admit and rule out cardiac cause.  - check orthostatics  - continue IVF  - I ordered Lexiscan stress test and Echo  - admit under observation  - first troponin 18, ordered 2 more checks    Leukemoid reaction- (present on admission)  Assessment & Plan  12.1, slightly elevated above normal range  Suspecting due to dehydration, correction with IVF  Recheck labs tomorrow. Pt denied any Fevers/Chills, dysuria, cough.    Erectile disorder- (present on admission)  Assessment & Plan  Pt took 4 Cialis, was told by his clinical pharmacist it was okay  I counseled patient on hypovolemia, ED and overuse of Cialis. I counseled patient to avoid nitrates and Cialis use.    Acute kidney injury (HCC)- (present on admission)  Assessment & Plan  Cr. 1.47, on lisinopril, SGLT-1 inhibitors  - BP low in ED  - will give IVF    BPH (benign prostatic hyperplasia)- (present on admission)  Assessment & Plan  Monitor while on IVF  Has prostatectomy for severe BPH in 6/2021    Hyperlipidemia- (present on admission)  Assessment & Plan  Continue home  rosuvastatin    Type 2 diabetes mellitus without complication, with long-term current use of insulin (HCC)- (present on admission)  Assessment & Plan  Hold home oral medications metformin and Jardiance.  Hold home Trulicity.  Continue patient on insulin sliding scale, glargine 10 units.  Monitor for hypoglycemia.  Continue Accu-Cheks.  Last A1c 7.5 on 9/14/2023.  Diabetes control improved compared to June.  Off of glipizide    Trulicity ADR:  Hypoglycemia, Serious allergic reactions  Acute kidney injury (dehydration)          VTE prophylaxis: SCDs/TEDs      Total time spent on admission 76 minutes.    This included my review with ER physician, review of ED events, patient's history, outside records, recent records, face to face interview, physical examination; my review of lab results (CBC, chemistry panel), imaging review (CXR) and ECG. Also includes counseling patient on admission, treatment plan, and documentation of encounter.

## 2023-10-08 NOTE — ED NOTES
Bedside report to Rossi HUNG. Pt on RA, placed on fall precaution.  Admitting MD notified of pt's head ache rates as 4/10. Neuro intact. Aaox4.

## 2023-10-08 NOTE — ASSESSMENT & PLAN NOTE
Cr. 1.47, on lisinopril, SGLT-1 inhibitors-on admit  - BP low in ED      Post IV fluids patient's creatinine has improved

## 2023-10-08 NOTE — PROGRESS NOTES
Monitor summary: Per Monitor Room     Sinus rhythm rate 59-82  Rare PAC, rare PVC Ectopy  0.20/0.09/0.39

## 2023-10-08 NOTE — HOSPITAL COURSE
John Reyes is a 68 y.o. male who presented 10/7/2023 with fainting at home. He has a past medical history of T2DM on insulin at home, A1c 7.5, hyperlipidemia, hypertension.  Patient was doing yard work this morning, became dizzy and sat down where he had a syncopal event.  Reported syncope for seconds, fell onto his knees first, then hit head.  He took 4 Cialis pills last night, was told by his urologist and clinical pharmacist it was okay. He states he wasn't drinking enough water recently, and has been working outside more, while continuing his BP meds and diabetes medications.     In the ED patient's blood pressure is 102/69 arrival.  He had leukocytosis 12.1, DOUG with wall creatinine 1.47.

## 2023-10-08 NOTE — ASSESSMENT & PLAN NOTE
Pt took 4 Cialis, was told by his clinical pharmacist it was okay  I counseled patient on hypovolemia, ED and overuse of Cialis. I counseled patient to avoid nitrates and Cialis use.

## 2023-10-08 NOTE — PROGRESS NOTES
4 Eyes Skin Assessment Completed by ANABELL Chou and ANABELL Palmer.    Head WDL  Ears WDL  Nose WDL  Mouth WDL  Neck WDL  Breast/Chest WDL  Shoulder Blades WDL  Spine WDL  (R) Arm/Elbow/Hand WDL  (L) Arm/Elbow/Hand WDL  Abdomen WDL  Groin WDL  Scrotum/Coccyx/Buttocks WDL  (R) Leg WDL  (L) Leg WDL  (R) Heel/Foot/Toe WDL  (L) Heel/Foot/Toe WDL          Devices In Places Tele Box      Interventions In Place Pillows    Possible Skin Injury No    Pictures Uploaded Into Epic N/A  Wound Consult Placed N/A  RN Wound Prevention Protocol Ordered No

## 2023-10-08 NOTE — CARE PLAN
Problem: Knowledge Deficit - Standard  Goal: Patient and family/care givers will demonstrate understanding of plan of care, disease process/condition, diagnostic tests and medications  Outcome: Progressing     Problem: Pain - Standard  Goal: Alleviation of pain or a reduction in pain to the patient’s comfort goal  Outcome: Progressing     Problem: Psychosocial  Goal: Patient's level of anxiety will decrease  Outcome: Progressing     Problem: Discharge Barriers/Planning  Goal: Patient's continuum of care needs are met  Outcome: Progressing     Problem: Hemodynamics  Goal: Patient's hemodynamics, fluid balance and neurologic status will be stable or improve  Outcome: Progressing   The patient is Stable - Low risk of patient condition declining or worsening    Shift Goals  Clinical Goals: Echo, stress test  Patient Goals: rest    Progress made toward(s) clinical / shift goals:  telemetry monitoring, stress test 10/9, echo today    Patient is not progressing towards the following goals:

## 2023-10-08 NOTE — PROGRESS NOTES
Assumed care of patient. He is resting in bed, VSS, A/Ox4. Nuclear medicine called unit and reported it must be 48 hrs since last cialis dose and patient took 9pm on 10/6, so next available stress test time would be 10/9 in the morning. Updated on plan, will discuss during rounds today. He is also scheduled for echo.

## 2023-10-08 NOTE — PROGRESS NOTES
[facility] Medicine Daily Progress Note    Date of Service  10/8/2023    Chief Complaint  John Reyes is a 68 y.o. male admitted 10/7/2023 with chest pain    Hospital Course  John Reyes is a 68 y.o. male who presented 10/7/2023 with fainting at home. He has a past medical history of T2DM on insulin at home, A1c 7.5, hyperlipidemia, hypertension.  Patient was doing yard work this morning, became dizzy and sat down where he had a syncopal event.  Reported syncope for seconds, fell onto his knees first, then hit head.  He took 4 Cialis pills last night, was told by his urologist and clinical pharmacist it was okay. He states he wasn't drinking enough water recently, and has been working outside more, while continuing his BP meds and diabetes medications.     In the ED patient's blood pressure is 102/69 arrival.  He had leukocytosis 12.1, DOUG with wall creatinine 1.47.    Interval Problem Update  Patient denies any chest pain this a.m.    No cough no fever chills or shortness of breath        Echocardiogram ordered    Stress test plan for 10/9/2023 to coordinate with the MRI.  Necessary to be off of his Cialis    Understands plan of care    I have discussed this patient's plan of care and discharge plan at IDT rounds today with Case Management, Nursing, Nursing leadership, and other members of the IDT team.    Consultants/Specialty      Code Status  Full Code    Disposition  The patient is not medically cleared for discharge to home or a post-acute facility.  Anticipate discharge to: home with close outpatient follow-up    I have placed the appropriate orders for post-discharge needs.    Review of Systems  Review of Systems   Constitutional:  Negative for fever and weight loss.   HENT:  Negative for hearing loss and tinnitus.    Eyes:  Negative for blurred vision, double vision, photophobia and pain.   Respiratory:  Negative for cough, hemoptysis and sputum production.    Cardiovascular:  Positive for chest pain. Negative  for palpitations and orthopnea.   Gastrointestinal:  Negative for abdominal pain, heartburn, nausea and vomiting.   Genitourinary:  Negative for dysuria, frequency and urgency.   Musculoskeletal:  Negative for myalgias and neck pain.   Neurological:  Negative for dizziness, tingling, tremors, sensory change and headaches.   Psychiatric/Behavioral:  Negative for depression, substance abuse and suicidal ideas.         Physical Exam  Temp:  [36.2 °C (97.2 °F)-37 °C (98.6 °F)] 36.8 °C (98.3 °F)  Pulse:  [64-95] 64  Resp:  [16-18] 16  BP: (102-133)/(61-84) 116/76  SpO2:  [92 %-98 %] 98 %    Physical Exam  Constitutional:       General: He is not in acute distress.     Appearance: He is not ill-appearing, toxic-appearing or diaphoretic.   HENT:      Head: Normocephalic.      Nose: Nose normal.      Mouth/Throat:      Mouth: Mucous membranes are moist.   Eyes:      General:         Left eye: No discharge.      Extraocular Movements: Extraocular movements intact.      Pupils: Pupils are equal, round, and reactive to light.   Cardiovascular:      Rate and Rhythm: Normal rate and regular rhythm.      Heart sounds: No murmur heard.     Friction rub present.   Pulmonary:      Effort: Pulmonary effort is normal. No respiratory distress.      Breath sounds: No stridor. No wheezing or rhonchi.   Abdominal:      General: There is no distension.      Palpations: There is no mass.      Tenderness: There is no abdominal tenderness. There is no guarding or rebound.      Hernia: No hernia is present.   Musculoskeletal:         General: No swelling, tenderness, deformity or signs of injury. Normal range of motion.      Cervical back: Normal range of motion.   Skin:     Capillary Refill: Capillary refill takes 2 to 3 seconds.      Coloration: Skin is not jaundiced or pale.      Findings: No bruising or erythema.   Neurological:      General: No focal deficit present.      Mental Status: He is oriented to person, place, and time.       Cranial Nerves: No cranial nerve deficit.      Motor: No weakness.   Psychiatric:         Mood and Affect: Mood normal.         Behavior: Behavior normal.         Fluids    Intake/Output Summary (Last 24 hours) at 10/8/2023 0915  Last data filed at 10/8/2023 0858  Gross per 24 hour   Intake --   Output 775 ml   Net -775 ml       Laboratory  Recent Labs     10/07/23  1759 10/08/23  0214   WBC 12.1* 10.5   RBC 5.35 4.85   HEMOGLOBIN 16.2 15.1   HEMATOCRIT 48.5 44.3   MCV 90.7 91.3   MCH 30.3 31.1   MCHC 33.4 34.1   RDW 46.3 47.3   PLATELETCT 243 226   MPV 9.2 9.6     Recent Labs     10/07/23  1759 10/08/23  0214   SODIUM 143 140   POTASSIUM 4.8 3.8   CHLORIDE 106 104   CO2 24 25   GLUCOSE 133* 107*   BUN 20 17   CREATININE 1.47* 1.16   CALCIUM 9.7 8.9                   Imaging  CT-HEAD W/O   Final Result         1. No acute intracranial abnormality. No evidence of acute intracranial hemorrhage or mass lesion.                     DX-CHEST-PORTABLE (1 VIEW)   Final Result         1. No acute cardiopulmonary abnormalities are identified.      NM-CARDIAC STRESS TEST    (Results Pending)   EC-ECHOCARDIOGRAM COMPLETE W/O CONT    (Results Pending)        Assessment/Plan  * Syncope and collapse- (present on admission)  Assessment & Plan  Patient was doing yard work this morning, became dizzy and sat down where he had a syncopal event.  Reported syncope for seconds, hit head.  Pt had low BP coming to ED, with DOUG which indicates dehydration.  Pt did take Cialis 4 pills last night.  -- I suspect patient has orthostatic hypotension (takes lisinopril, dehydrated from Trulicity/Jardiance, forgets to drink water, took Cialis) resulting in a vasovagal event. Will admit and rule out cardiac cause.  Patient is not orthostatic  IV fluids discontinued after 24 hours   Lexiscan stress test and Echo  -    Leukemoid reaction- (present on admission)  Assessment & Plan  Suspect reactive    Follow BMP    Erectile disorder- (present on  admission)  Assessment & Plan  Pt took 4 Cialis, was told by his clinical pharmacist it was okay  I counseled patient on hypovolemia, ED and overuse of Cialis. I counseled patient to avoid nitrates and Cialis use.    Acute kidney injury (HCC)- (present on admission)  Assessment & Plan  Cr. 1.47, on lisinopril, SGLT-1 inhibitors-on admit  - BP low in ED      Post IV fluids patient's creatinine has improved    BPH (benign prostatic hyperplasia)- (present on admission)  Assessment & Plan    Has prostatectomy for severe BPH in 6/2021    Hyperlipidemia- (present on admission)  Assessment & Plan  Continue home rosuvastatin    Type 2 diabetes mellitus without complication, with long-term current use of insulin (HCC)- (present on admission)  Assessment & Plan  Hold home oral medications metformin and Jardiance.  Hold home Trulicity.  Continue patient on insulin sliding scale, glargine 10 units.  Monitor for hypoglycemia.  Continue Accu-Cheks.  Last A1c 7.5 on 9/14/2023.  Diabetes control improved compared to June.               VTE prophylaxis:   SCDs/TEDs      I have performed a physical exam and reviewed and updated ROS and Plan today (10/8/2023). In review of yesterday's note (10/7/2023), there are no changes except as documented above.

## 2023-10-08 NOTE — CARE PLAN
Problem: Knowledge Deficit - Standard  Goal: Patient and family/care givers will demonstrate understanding of plan of care, disease process/condition, diagnostic tests and medications  Outcome: Progressing     Problem: Pain - Standard  Goal: Alleviation of pain or a reduction in pain to the patient’s comfort goal  Outcome: Progressing     Problem: Psychosocial  Goal: Patient's level of anxiety will decrease  Outcome: Progressing     Problem: Discharge Barriers/Planning  Goal: Patient's continuum of care needs are met  Outcome: Progressing     Problem: Hemodynamics  Goal: Patient's hemodynamics, fluid balance and neurologic status will be stable or improve  Outcome: Progressing     Problem: Infection - Standard  Goal: Patient will remain free from infection  Outcome: Progressing  Flowsheets (Taken 10/7/2023 2119)  Standard Infection Interventions:   Implemented standard precautions   Assessed for signs and symptoms of infection   The patient is Stable - Low risk of patient condition declining or worsening    Shift Goals  Clinical Goals: Echo, stress test  Patient Goals: rest    Progress made toward(s) clinical / shift goals:  improving    Patient is not progressing towards the following goals:
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
## 2023-10-09 ENCOUNTER — APPOINTMENT (OUTPATIENT)
Dept: RADIOLOGY | Facility: MEDICAL CENTER | Age: 68
End: 2023-10-09
Attending: INTERNAL MEDICINE
Payer: MEDICARE

## 2023-10-09 VITALS
SYSTOLIC BLOOD PRESSURE: 134 MMHG | DIASTOLIC BLOOD PRESSURE: 80 MMHG | HEART RATE: 64 BPM | WEIGHT: 214.51 LBS | HEIGHT: 71 IN | OXYGEN SATURATION: 95 % | TEMPERATURE: 97.5 F | RESPIRATION RATE: 16 BRPM | BODY MASS INDEX: 30.03 KG/M2

## 2023-10-09 LAB — GLUCOSE BLD STRIP.AUTO-MCNC: 152 MG/DL (ref 65–99)

## 2023-10-09 PROCEDURE — 78452 HT MUSCLE IMAGE SPECT MULT: CPT

## 2023-10-09 PROCEDURE — 700111 HCHG RX REV CODE 636 W/ 250 OVERRIDE (IP)

## 2023-10-09 PROCEDURE — 96372 THER/PROPH/DIAG INJ SC/IM: CPT | Mod: XU

## 2023-10-09 PROCEDURE — 99239 HOSP IP/OBS DSCHRG MGMT >30: CPT | Performed by: HOSPITALIST

## 2023-10-09 PROCEDURE — G0378 HOSPITAL OBSERVATION PER HR: HCPCS

## 2023-10-09 PROCEDURE — 36415 COLL VENOUS BLD VENIPUNCTURE: CPT

## 2023-10-09 PROCEDURE — 82962 GLUCOSE BLOOD TEST: CPT

## 2023-10-09 PROCEDURE — 700102 HCHG RX REV CODE 250 W/ 637 OVERRIDE(OP): Performed by: INTERNAL MEDICINE

## 2023-10-09 PROCEDURE — A9270 NON-COVERED ITEM OR SERVICE: HCPCS | Performed by: INTERNAL MEDICINE

## 2023-10-09 RX ORDER — REGADENOSON 0.08 MG/ML
INJECTION, SOLUTION INTRAVENOUS
Status: COMPLETED
Start: 2023-10-09 | End: 2023-10-09

## 2023-10-09 RX ORDER — INSULIN GLARGINE 100 [IU]/ML
26 INJECTION, SOLUTION SUBCUTANEOUS EVERY EVENING
Status: SHIPPED
Start: 2023-10-09

## 2023-10-09 RX ADMIN — REGADENOSON 0.4 MG: 0.08 INJECTION, SOLUTION INTRAVENOUS at 10:16

## 2023-10-09 RX ADMIN — ACETAMINOPHEN 650 MG: 325 TABLET, FILM COATED ORAL at 06:03

## 2023-10-09 NOTE — CARE PLAN
The patient is Stable - Low risk of patient condition declining or worsening    Shift Goals  Clinical Goals: stress test  Patient Goals: have stress test completed  Family Goals: not at bedside    Progress made toward(s) clinical / shift goals:    Problem: Knowledge Deficit - Standard  Goal: Patient and family/care givers will demonstrate understanding of plan of care, disease process/condition, diagnostic tests and medications  Description: Target End Date:  10/9/2023    Document in Patient Education    1.  Patient and family/caregiver oriented to unit, equipment, visitation policy and means for communicating concern  2.  Complete/review Learning Assessment  3.  Assess knowledge level of disease process/condition, treatment plan, diagnostic tests and medications  4.  Explain disease process/condition, treatment plan, diagnostic tests and medications  Outcome: Progressing     Problem: Discharge Barriers/Planning  Goal: Patient's continuum of care needs are met  Description: Target End Date: 10/9/2023    1.  Identify potential discharge barriers on admission and throughout hospitalization  2.  Collaborate with Case Management, , Clinical Educators, Navigators and others on the transitional care team to meet discharge needs  3.  Involve patient/family/caregivers in setting and prioritizing goals for hospitalization and discharge  4.  Ensure Flu vaccinations are addressed  5.  Inquire if patient is interested in the Meds to Bed program  6.  Ensure patient and family/caregiver are able to demonstrate use of equipment as prescribed  7.  Ensure patient and family/caregiver can verbalize understanding of patient education  8.  Explain discharge instructions and medication reconciliation to patient and family/caregiver  Outcome: Progressing     Problem: Fall Risk  Goal: Patient will remain free from falls  Description: Target End Date:  10/9/2023    Document interventions on the [facility] Fall Risk  Assessment    1.  Assess for fall risk factors  2.  Implement fall precautions  Outcome: Progressing

## 2023-10-09 NOTE — DISCHARGE SUMMARY
Discharge Summary    CHIEF COMPLAINT ON ADMISSION  Chief Complaint   Patient presents with    Syncope     The pt reports dizziness that started while doing yard work this morning. The pt had a syncope while sitting in yard. Negative trauma/head strike. LOC was several seconds.        Reason for Admission  Syncope     Admission Date  10/7/2023    CODE STATUS  Full Code    HPI & HOSPITAL COURSE    John Reyes is a 68 y.o. male who presented 10/7/2023 with fainting at home. He has a past medical history of T2DM on insulin at home, A1c 7.5, hyperlipidemia, hypertension.  Patient was doing yard work this morning, became dizzy and sat down where he had a syncopal event.  Reported syncope for seconds, fell onto his knees first, then hit head.  He took 4 Cialis pills last night, was told by his urologist and clinical pharmacist it was okay. He states he wasn't drinking enough water recently, and has been working outside more, while continuing his BP meds and diabetes medications.     In the ED patient's blood pressure is 102/69 arrival.  He had leukocytosis 12.1, DOUG with wall creatinine 1.47.      Patient was hydrated during hospital stay.  He had echocardiogram and cardiac stress that did not show any acute abnormalities.  We are attributing his symptoms to vagal phenomenon related to dehydration and Cialis.    Patient was cleared for discharge home on 10/9/2023  Therefore, he is discharged in good and stable condition to home with close outpatient follow-up.    The patient recovered much more quickly than anticipated on admission.    Discharge Date  10/9    FOLLOW UP ITEMS POST DISCHARGE      DISCHARGE DIAGNOSES  Principal Problem:    Syncope and collapse (POA: Yes)  Active Problems:    Type 2 diabetes mellitus without complication, with long-term current use of insulin (HCC) (POA: Yes)      Overview: Seeing pharm for med optimization/mgt      Taking Metformin 1000mg BID; Jardiance 25mg QD (increased 8/15/2023);        Lantus at 26 units QHS (increased 9/10/2023). Trulicity 3mg Qweek       (increased 9/7/2023);      Also on OTC cinnamon oral supplements, states it helps regulate his BG            GFR 90; Microalbu creat ratio 19      BP managed on Lisinopril 10mg QD      Hyperlipidemia managed on Rosuvastatin 20mg QHS      No neuropathy reported; Normal Monofilament 11/15/2022      Retinal Screen: 4/2023 at [facility]; no retinopathy; hx lasik surgery;       early cataracts; we will get records from recent eye exam          Hyperlipidemia (POA: Yes)      Overview: Taking Rosuvastatin 20mg QHS with Co Q10    BPH (benign prostatic hyperplasia) (POA: Yes)      Overview: Sees Karla at [facility] [place] annually; next appointment in 6/2022;       Takes Cialis 5mg Q evening      - robotic assisted prostatectomy on 6/1/2021 for severe BPH and bladder       outlet obstruction by Dr. Aguirre       - no hematuria; urine flow is okay    Acute kidney injury (HCC) (POA: Yes)    Erectile disorder (POA: Yes)    Leukemoid reaction (POA: Yes)  Resolved Problems:    * No resolved hospital problems. *      FOLLOW UP  Future Appointments   Date Time Provider Department Center   11/7/2023  9:00 AM [facility]   12/14/2023  9:20 AM Denisse Mata D.N.P. [facility]     No follow-up provider specified.    MEDICATIONS ON DISCHARGE     Medication List        CONTINUE taking these medications        Instructions   Advanced Diabetic Multivitamin Tabs   Take 1 Tablet by mouth every day.  Dose: 1 Tablet     B-D UF III MINI PEN NEEDLES 31G X 5 MM Misc  Generic drug: Insulin Pen Needle   USE 1 PEN NEEDLE DAILY     Cinnamon 500 MG Caps   Take 500 mg by mouth 2 times a day.  Dose: 500 mg     coenzyme Q-10 30 MG capsule   Take 60 mg by mouth every evening.  Dose: 60 mg     * Jardiance 10 MG Tabs tablet  Generic drug: Empagliflozin   Take 20 mg by mouth every day. 10 mg x 2 tablets = 20 mg  Dose: 20 mg     * Jardiance 25 MG Tabs  Generic drug:  Empagliflozin   Doctor's comments: This rx was submitted by a pharmacist working under a collaborative practice agreement.  Take 1 Tablet by mouth every day.  Dose: 1 Tablet     Lantus SoloStar 100 UNIT/ML Sopn injection  Generic drug: insulin glargine   Doctor's comments: Resending prescription with increase in dosing to 10 units QHS  Inject 26 Units under the skin every evening.  Dose: 26 Units     lisinopril 10 MG Tabs  Commonly known as: Prinivil   Doctor's comments: Requesting 1 year supply  Take 1 Tablet by mouth every day.  Dose: 10 mg     magnesium oxide 400 MG Tabs tablet  Commonly known as: Mag-Ox   Take 400 mg by mouth every day.  Dose: 400 mg     metformin 1000 MG tablet  Commonly known as: Glucophage   Doctor's comments: This rx was submitted by a pharmacist working under a collaborative practice agreement.  Take 1 Tablet by mouth 2 times a day with meals.  Dose: 1,000 mg     OneTouch Ultra strip  Generic drug: glucose blood   USE TO TEST BLOOD SUGAR AS  NEEDED     rosuvastatin 20 MG Tabs  Commonly known as: Crestor   Doctor's comments: Requesting 1 year supply  TAKE 1 TABLET BY MOUTH IN  THE EVENING     tadalafil 5 MG tablet  Commonly known as: Cialis   Take 1 Tablet by mouth every evening.  Dose: 5 mg     Trulicity 3 MG/0.5ML Sopn  Generic drug: Dulaglutide   Doctor's comments: This rx was submitted by a pharmacist working under a collaborative practice agreement.  Inject 1 Pen under the skin every 7 days.  Dose: 1 Pen     vitamin D3 1000 Unit (25 mcg) Tabs  Commonly known as: Cholecalciferol   Take 1,000 Units by mouth every day.  Dose: 1,000 Units     VITAMIN K PO   Take 1 Tablet by mouth every evening. * OTC*  Dose: 1 Tablet           * This list has 2 medication(s) that are the same as other medications prescribed for you. Read the directions carefully, and ask your doctor or other care provider to review them with you.                  Allergies  Allergies   Allergen Reactions    Sulfa Drugs Rash      Red man syndrome       Septra [Bactrim Ds] Rash     Per pt. Doesn't remember          DIET  Orders Placed This Encounter   Procedures    Diet Order Diet: Consistent CHO (Diabetic)     Standing Status:   Standing     Number of Occurrences:   1     Order Specific Question:   Diet:     Answer:   Consistent CHO (Diabetic) [4]       ACTIVITY  As tolerated.  Weight bearing as tolerated    CONSULTATIONS    PROCEDURES  Echo    stress    LABORATORY  Lab Results   Component Value Date    SODIUM 140 10/08/2023    POTASSIUM 3.8 10/08/2023    CHLORIDE 104 10/08/2023    CO2 25 10/08/2023    GLUCOSE 107 (H) 10/08/2023    BUN 17 10/08/2023    CREATININE 1.16 10/08/2023    CREATININE 1.07 02/12/2011    GLOMRATE >59 02/12/2011        Lab Results   Component Value Date    WBC 10.5 10/08/2023    WBC 9.1 08/16/2010    HEMOGLOBIN 15.1 10/08/2023    HEMATOCRIT 44.3 10/08/2023    PLATELETCT 226 10/08/2023        Total time of the discharge process exceeds 39  minutes.

## 2023-10-09 NOTE — DISCHARGE INSTRUCTIONS
Syncope, Adult    Syncope is when you pass out or faint for a short time. It is caused by a sudden decrease in blood flow to the brain. This can happen for many reasons. It can sometimes happen when seeing blood, getting a shot (injection), or having pain or strong emotions. Most causes of fainting are not dangerous, but in some cases it can be a sign of a serious medical problem.  If you faint, get help right away. Call your local emergency services (911 in the [place]  Follow these instructions at home:  Watch for any changes in your symptoms. Take these actions to stay safe and help with your symptoms:  Knowing when you may be about to faint  Signs that you may be about to faint include:  Feeling dizzy or light-headed. It may feel like the room is spinning.  Feeling weak.  Feeling like you may vomit (nauseous).  Seeing spots or seeing all white or all black.  Having cold, clammy skin.  Feeling warm and sweaty.  Hearing ringing in the ears.  If you start to feel like you might faint, sit or lie down right away. If sitting, lower your head down between your legs. If lying down, raise (elevate) your feet above the level of your heart.  Breathe deeply and steadily. Wait until all of the symptoms are gone.  Have someone stay with you until you feel better.  Medicines  Take over-the-counter and prescription medicines only as told by your doctor.  If you are taking blood pressure or heart medicine, sit up and stand up slowly. Spend a few minutes getting ready to sit and then stand. This can help you feel less dizzy.  Lifestyle  Do not drive, use machinery, or play sports until your doctor says it is okay.  Do not drink alcohol.  Do not smoke or use any products that contain nicotine or tobacco. If you need help quitting, ask your doctor.  Avoid hot tubs and saunas.  General instructions  Talk with your doctor about your symptoms. You may need to have testing to help find the cause.  Drink enough fluid to keep your pee  (urine) pale yellow.  Avoid standing for a long time. If you must stand for a long time, do movements such as:  Moving your legs.  Crossing your legs.  Flexing and stretching your leg muscles.  Squatting.  Keep all follow-up visits.  Contact a doctor if:  You have episodes of near fainting.  Get help right away if:  You pass out or faint.  You hit your head or are injured after fainting.  You have any of these symptoms:  Fast or uneven heartbeats (palpitations).  Pain in your chest, belly, or back.  Shortness of breath.  You have jerky movements that you cannot control (seizure).  You have a very bad headache.  You are confused.  You have problems with how you see (vision).  You are very weak.  You have trouble walking.  You are bleeding from your mouth or your butt (rectum).  You have black or tarry poop (stool).  These symptoms may be an emergency. Get help right away. Call your local emergency services (911 in the [place]  Do not wait to see if the symptoms will go away.  Do not drive yourself to the hospital.  Summary  Syncope is when you pass out or faint for a short time. It is caused by a sudden decrease in blood flow to the brain.  Signs that you may be about to faint include feeling dizzy or light-headed, feeling like you may vomit, seeing all white or all black, or having cold, clammy skin.  If you start to feel like you might faint, sit or lie down right away. Lower your head if sitting, or raise (elevate) your feet if lying down. Breathe deeply and steadily. Wait until all of the symptoms are gone.  This information is not intended to replace advice given to you by your health care provider. Make sure you discuss any questions you have with your health care provider.  Document Revised: 04/28/2022 Document Reviewed: 04/28/2022  Elsevier Patient Education © 2023 [employer]

## 2023-10-09 NOTE — PROGRESS NOTES
Bedside report received 0725. POC discussed with pt; Pt denies CP, c/o HA, declines intervention, Verbalizes a need to leave hospital as soon as possible, discussed AMA process, pt willing to stay for test at 0930; all questions answered at this time.

## 2023-10-09 NOTE — PROGRESS NOTES
Tele Monitor Report  Rhythm: sinus veronica/reg  HR: 55 - 75  Ectopy: rare PACs  0.17 / 0.11 / 0.40

## 2023-10-10 ENCOUNTER — PATIENT OUTREACH (OUTPATIENT)
Dept: MEDICAL GROUP | Facility: PHYSICIAN GROUP | Age: 68
End: 2023-10-10

## 2023-10-10 NOTE — PROGRESS NOTES
Transitional Care Management  TCM Outreach Date and Time: Filed (10/10/2023  3:19 PM)    Discharge Questions  Actual Discharge Date: 10/09/23  Now that you are home, how are you feeling?: Very Good (No chest pain or issues; states he had a medication mix up)  Did you receive any new prescriptions?: No  Do you have any questions about your current medications or new medications (Review Med Rec)?: No  Do you have a follow up appointment scheduled with your PCP?: Yes  Appointment Date: 10/18/23  Appointment Time: 0840  Any issues or paperwork you wish to discuss with your PCP?: Yes (Please specify) (Wants to review his medications and testing results from the hospistal)  Does this patient qualify for the CCM program?: No    Transitional Care  Number of attempts made to contact patient: 1  Current or previous attempts competed within two business days of discharge? : Yes  Provided education regarding treatment plan, medications, self-management, ADLs?: No  Has patient completed an Advanced Directive?: Yes  Is the patient's advanced directive on file?: Yes  Has the Care Manager's phone number provided?: No  Is there anything else I can help you with?: No    Discharge Summary  Chief Complaint: Syncopy; LOC  Admitting Diagnosis: Vagal episode; Syncopy  Discharge Diagnosis: Syncopy with collapse

## 2023-10-26 ENCOUNTER — OFFICE VISIT (OUTPATIENT)
Dept: MEDICAL GROUP | Facility: PHYSICIAN GROUP | Age: 68
End: 2023-10-26
Payer: MEDICARE

## 2023-10-26 VITALS
TEMPERATURE: 96.7 F | HEART RATE: 70 BPM | SYSTOLIC BLOOD PRESSURE: 116 MMHG | DIASTOLIC BLOOD PRESSURE: 70 MMHG | RESPIRATION RATE: 18 BRPM | OXYGEN SATURATION: 95 % | BODY MASS INDEX: 33.43 KG/M2 | HEIGHT: 67 IN | WEIGHT: 213 LBS

## 2023-10-26 DIAGNOSIS — Z09 HOSPITAL DISCHARGE FOLLOW-UP: ICD-10-CM

## 2023-10-26 DIAGNOSIS — R55 SYNCOPE AND COLLAPSE: ICD-10-CM

## 2023-10-26 PROCEDURE — 3078F DIAST BP <80 MM HG: CPT | Performed by: NURSE PRACTITIONER

## 2023-10-26 PROCEDURE — 3074F SYST BP LT 130 MM HG: CPT | Performed by: NURSE PRACTITIONER

## 2023-10-26 PROCEDURE — 99213 OFFICE O/P EST LOW 20 MIN: CPT | Performed by: NURSE PRACTITIONER

## 2023-10-26 ASSESSMENT — ENCOUNTER SYMPTOMS
PALPITATIONS: 0
PSYCHIATRIC NEGATIVE: 1
SHORTNESS OF BREATH: 0
MUSCULOSKELETAL NEGATIVE: 1
EYES NEGATIVE: 1
GASTROINTESTINAL NEGATIVE: 1
SPUTUM PRODUCTION: 0
FEVER: 0
CONSTITUTIONAL NEGATIVE: 1
NEUROLOGICAL NEGATIVE: 1
COUGH: 0

## 2023-10-26 ASSESSMENT — FIBROSIS 4 INDEX: FIB4 SCORE: 1.38
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
## 2023-11-07 ENCOUNTER — NON-PROVIDER VISIT (OUTPATIENT)
Dept: MEDICAL GROUP | Facility: PHYSICIAN GROUP | Age: 68
End: 2023-11-07
Payer: MEDICARE

## 2023-11-07 PROCEDURE — 99211 OFF/OP EST MAY X REQ PHY/QHP: CPT | Performed by: NURSE PRACTITIONER

## 2023-11-07 RX ORDER — GINSENG 100 MG
CAPSULE ORAL
COMMUNITY

## 2023-11-07 RX ORDER — B-COMPLEX WITH VITAMIN C
TABLET ORAL
COMMUNITY
End: 2023-11-07
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
## 2023-12-11 SDOH — ECONOMIC STABILITY: INCOME INSECURITY: IN THE LAST 12 MONTHS, WAS THERE A TIME WHEN YOU WERE NOT ABLE TO PAY THE MORTGAGE OR RENT ON TIME?: NO

## 2023-12-11 SDOH — ECONOMIC STABILITY: FOOD INSECURITY: WITHIN THE PAST 12 MONTHS, THE FOOD YOU BOUGHT JUST DIDN'T LAST AND YOU DIDN'T HAVE MONEY TO GET MORE.: NEVER TRUE

## 2023-12-11 SDOH — HEALTH STABILITY: MENTAL HEALTH
STRESS IS WHEN SOMEONE FEELS TENSE, NERVOUS, ANXIOUS, OR CAN'T SLEEP AT NIGHT BECAUSE THEIR MIND IS TROUBLED. HOW STRESSED ARE YOU?: ONLY A LITTLE

## 2023-12-11 SDOH — HEALTH STABILITY: PHYSICAL HEALTH: ON AVERAGE, HOW MANY DAYS PER WEEK DO YOU ENGAGE IN MODERATE TO STRENUOUS EXERCISE (LIKE A BRISK WALK)?: 3 DAYS

## 2023-12-11 SDOH — ECONOMIC STABILITY: HOUSING INSECURITY: IN THE LAST 12 MONTHS, HOW MANY PLACES HAVE YOU LIVED?: 1

## 2023-12-11 SDOH — ECONOMIC STABILITY: FOOD INSECURITY: WITHIN THE PAST 12 MONTHS, YOU WORRIED THAT YOUR FOOD WOULD RUN OUT BEFORE YOU GOT MONEY TO BUY MORE.: NEVER TRUE

## 2023-12-11 SDOH — HEALTH STABILITY: PHYSICAL HEALTH: ON AVERAGE, HOW MANY MINUTES DO YOU ENGAGE IN EXERCISE AT THIS LEVEL?: 30 MIN

## 2023-12-11 SDOH — ECONOMIC STABILITY: INCOME INSECURITY: HOW HARD IS IT FOR YOU TO PAY FOR THE VERY BASICS LIKE FOOD, HOUSING, MEDICAL CARE, AND HEATING?: SOMEWHAT HARD

## 2023-12-11 ASSESSMENT — LIFESTYLE VARIABLES
AUDIT-C TOTAL SCORE: 1
HOW MANY STANDARD DRINKS CONTAINING ALCOHOL DO YOU HAVE ON A TYPICAL DAY: PATIENT DOES NOT DRINK
SKIP TO QUESTIONS 9-10: 1
HOW OFTEN DO YOU HAVE SIX OR MORE DRINKS ON ONE OCCASION: NEVER
HOW OFTEN DO YOU HAVE A DRINK CONTAINING ALCOHOL: MONTHLY OR LESS

## 2023-12-11 ASSESSMENT — SOCIAL DETERMINANTS OF HEALTH (SDOH)
HOW OFTEN DO YOU ATTEND CHURCH OR RELIGIOUS SERVICES?: NEVER
IN A TYPICAL WEEK, HOW MANY TIMES DO YOU TALK ON THE PHONE WITH FAMILY, FRIENDS, OR NEIGHBORS?: MORE THAN THREE TIMES A WEEK
WITHIN THE PAST 12 MONTHS, YOU WORRIED THAT YOUR FOOD WOULD RUN OUT BEFORE YOU GOT THE MONEY TO BUY MORE: NEVER TRUE
HOW MANY DRINKS CONTAINING ALCOHOL DO YOU HAVE ON A TYPICAL DAY WHEN YOU ARE DRINKING: PATIENT DOES NOT DRINK
HOW OFTEN DO YOU GET TOGETHER WITH FRIENDS OR RELATIVES?: MORE THAN THREE TIMES A WEEK
DO YOU BELONG TO ANY CLUBS OR ORGANIZATIONS SUCH AS CHURCH GROUPS UNIONS, FRATERNAL OR ATHLETIC GROUPS, OR SCHOOL GROUPS?: YES
DO YOU BELONG TO ANY CLUBS OR ORGANIZATIONS SUCH AS CHURCH GROUPS UNIONS, FRATERNAL OR ATHLETIC GROUPS, OR SCHOOL GROUPS?: YES
HOW OFTEN DO YOU HAVE A DRINK CONTAINING ALCOHOL: MONTHLY OR LESS
HOW OFTEN DO YOU ATTENT MEETINGS OF THE CLUB OR ORGANIZATION YOU BELONG TO?: 1 TO 4 TIMES PER YEAR
HOW OFTEN DO YOU GET TOGETHER WITH FRIENDS OR RELATIVES?: MORE THAN THREE TIMES A WEEK
IN A TYPICAL WEEK, HOW MANY TIMES DO YOU TALK ON THE PHONE WITH FAMILY, FRIENDS, OR NEIGHBORS?: MORE THAN THREE TIMES A WEEK
HOW OFTEN DO YOU ATTEND CHURCH OR RELIGIOUS SERVICES?: NEVER
HOW OFTEN DO YOU HAVE SIX OR MORE DRINKS ON ONE OCCASION: NEVER
HOW OFTEN DO YOU ATTENT MEETINGS OF THE CLUB OR ORGANIZATION YOU BELONG TO?: 1 TO 4 TIMES PER YEAR
HOW HARD IS IT FOR YOU TO PAY FOR THE VERY BASICS LIKE FOOD, HOUSING, MEDICAL CARE, AND HEATING?: SOMEWHAT HARD

## 2023-12-14 ENCOUNTER — OFFICE VISIT (OUTPATIENT)
Dept: MEDICAL GROUP | Facility: PHYSICIAN GROUP | Age: 68
End: 2023-12-14
Payer: MEDICARE

## 2023-12-14 VITALS
OXYGEN SATURATION: 98 % | BODY MASS INDEX: 29.53 KG/M2 | WEIGHT: 218 LBS | DIASTOLIC BLOOD PRESSURE: 70 MMHG | SYSTOLIC BLOOD PRESSURE: 112 MMHG | TEMPERATURE: 98.3 F | HEIGHT: 72 IN | HEART RATE: 63 BPM

## 2023-12-14 DIAGNOSIS — E11.9 TYPE 2 DIABETES MELLITUS WITHOUT COMPLICATION, WITH LONG-TERM CURRENT USE OF INSULIN (HCC): ICD-10-CM

## 2023-12-14 DIAGNOSIS — E55.9 VITAMIN D DEFICIENCY: ICD-10-CM

## 2023-12-14 DIAGNOSIS — E78.2 MIXED HYPERLIPIDEMIA: ICD-10-CM

## 2023-12-14 DIAGNOSIS — Z00.00 ENCOUNTER FOR ANNUAL WELLNESS VISIT (AWV) IN MEDICARE PATIENT: ICD-10-CM

## 2023-12-14 DIAGNOSIS — Z79.4 TYPE 2 DIABETES MELLITUS WITHOUT COMPLICATION, WITH LONG-TERM CURRENT USE OF INSULIN (HCC): ICD-10-CM

## 2023-12-14 DIAGNOSIS — R35.1 BENIGN PROSTATIC HYPERPLASIA WITH NOCTURIA: ICD-10-CM

## 2023-12-14 DIAGNOSIS — N40.1 BENIGN PROSTATIC HYPERPLASIA WITH NOCTURIA: ICD-10-CM

## 2023-12-14 PROCEDURE — 3078F DIAST BP <80 MM HG: CPT | Performed by: NURSE PRACTITIONER

## 2023-12-14 PROCEDURE — G0439 PPPS, SUBSEQ VISIT: HCPCS | Performed by: NURSE PRACTITIONER

## 2023-12-14 PROCEDURE — 3074F SYST BP LT 130 MM HG: CPT | Performed by: NURSE PRACTITIONER

## 2023-12-14 ASSESSMENT — PATIENT HEALTH QUESTIONNAIRE - PHQ9: CLINICAL INTERPRETATION OF PHQ2 SCORE: 0

## 2023-12-14 ASSESSMENT — ACTIVITIES OF DAILY LIVING (ADL): BATHING_REQUIRES_ASSISTANCE: 0

## 2023-12-14 ASSESSMENT — FIBROSIS 4 INDEX: FIB4 SCORE: 1.38

## 2023-12-14 ASSESSMENT — ENCOUNTER SYMPTOMS: GENERAL WELL-BEING: FAIR

## 2023-12-14 NOTE — ASSESSMENT & PLAN NOTE
Chronic; goal A1C < 6.5  A1C 7.5 on 9/14/2023 (down from 8.2); continue plan per Sancho/pharm; he is walking around the [employer] for exercise. asting BG yesterday morning 121, this morning 115   - glipizide d/imani 5/2/2023  - trulicity 0.75mg added 5/2/2023; tolerating and increased to 1.5mg on 6/6/2023; increased to 3mg on 9/7/2023.   - Metformin 1000mg BID  - Jardiance 25mg QD; increased 8/15/2023  - Lantus 26 units QHS; increased 9/10/2023  - may continue oral cinnamon as well if he sees improvement in fasting BG  - recheck A1C in 3 months; next appointment with pharm on 12/19/2023
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
## 2023-12-15 NOTE — ASSESSMENT & PLAN NOTE
Latest Reference Range & Units 06/06/22 06:39 03/06/23 08:04   25-Hydroxy   Vitamin D 25 30 - 100 ng/mL 21 (L) 35     - continue vit D supplement 2000 units daily (in addition to daily MVI)  - monitor vit D level with annual labs

## 2023-12-15 NOTE — ASSESSMENT & PLAN NOTE
Seepato Acosta at urology [place] annually; next appointment in 6/2022; Takes Cialis 5mg Q evening  - robotic assisted prostatectomy on 6/1/2021 for severe BPH and bladder outlet obstruction by Dr. Aguirre   - no hematuria; urine flow is okay

## 2023-12-19 ENCOUNTER — OFFICE VISIT (OUTPATIENT)
Dept: MEDICAL GROUP | Facility: PHYSICIAN GROUP | Age: 68
End: 2023-12-19
Payer: MEDICARE

## 2023-12-19 DIAGNOSIS — E11.9 TYPE 2 DIABETES MELLITUS WITHOUT COMPLICATION, WITH LONG-TERM CURRENT USE OF INSULIN (HCC): ICD-10-CM

## 2023-12-19 DIAGNOSIS — Z79.4 TYPE 2 DIABETES MELLITUS WITHOUT COMPLICATION, WITH LONG-TERM CURRENT USE OF INSULIN (HCC): ICD-10-CM

## 2023-12-19 LAB
HBA1C MFR BLD: 6.7 % (ref ?–5.8)
POCT INT CON NEG: NEGATIVE
POCT INT CON POS: POSITIVE

## 2023-12-19 PROCEDURE — 83036 HEMOGLOBIN GLYCOSYLATED A1C: CPT | Performed by: NURSE PRACTITIONER

## 2023-12-19 PROCEDURE — 99211 OFF/OP EST MAY X REQ PHY/QHP: CPT | Performed by: PHARMACIST

## 2023-12-19 RX ORDER — DULAGLUTIDE 3 MG/.5ML
1 INJECTION, SOLUTION SUBCUTANEOUS
Qty: 6 ML | Refills: 3 | Status: SHIPPED | OUTPATIENT
Start: 2023-12-19
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
## 2024-01-29 DIAGNOSIS — E78.2 MIXED HYPERLIPIDEMIA: ICD-10-CM

## 2024-01-31 RX ORDER — ROSUVASTATIN CALCIUM 20 MG/1
TABLET, COATED ORAL
Qty: 90 TABLET | Refills: 3 | Status: SHIPPED | OUTPATIENT
Start: 2024-01-31

## 2024-02-08 ENCOUNTER — HOSPITAL ENCOUNTER (OUTPATIENT)
Dept: LAB | Facility: MEDICAL CENTER | Age: 69
End: 2024-02-08
Attending: PHYSICIAN ASSISTANT
Payer: MEDICARE

## 2024-02-08 LAB — PSA SERPL-MCNC: 0.73 NG/ML (ref 0–4)

## 2024-02-08 PROCEDURE — 36415 COLL VENOUS BLD VENIPUNCTURE: CPT

## 2024-02-08 PROCEDURE — 84153 ASSAY OF PSA TOTAL: CPT

## 2024-02-13 ENCOUNTER — HOSPITAL ENCOUNTER (OUTPATIENT)
Facility: MEDICAL CENTER | Age: 69
End: 2024-02-13
Attending: PHYSICIAN ASSISTANT
Payer: MEDICARE

## 2024-02-13 LAB
FSH SERPL-ACNC: 12.1 MIU/ML (ref 1.5–12.4)
LH SERPL-ACNC: 7.6 IU/L (ref 1.7–8.6)
PROLACTIN SERPL-MCNC: 5.3 NG/ML (ref 2.1–17.7)

## 2024-02-13 PROCEDURE — 84146 ASSAY OF PROLACTIN: CPT

## 2024-02-13 PROCEDURE — 83002 ASSAY OF GONADOTROPIN (LH): CPT

## 2024-02-13 PROCEDURE — 83001 ASSAY OF GONADOTROPIN (FSH): CPT

## 2024-02-13 PROCEDURE — 84402 ASSAY OF FREE TESTOSTERONE: CPT

## 2024-02-13 PROCEDURE — 84270 ASSAY OF SEX HORMONE GLOBUL: CPT

## 2024-02-13 PROCEDURE — 84403 ASSAY OF TOTAL TESTOSTERONE: CPT

## 2024-02-15 LAB
HCT VFR BLD AUTO: 51.6 % (ref 42–52)
HGB BLD-MCNC: 17 G/DL (ref 14–18)
SHBG SERPL-SCNC: 23 NMOL/L (ref 19–76)
TESTOST FREE MFR SERPL: 2.1 % (ref 1.6–2.9)
TESTOST FREE SERPL-MCNC: 32 PG/ML (ref 47–244)
TESTOST SERPL-MCNC: 155 NG/DL (ref 300–720)

## 2024-04-09 ENCOUNTER — APPOINTMENT (OUTPATIENT)
Dept: MEDICAL GROUP | Facility: PHYSICIAN GROUP | Age: 69
End: 2024-04-09
Payer: MEDICARE

## 2024-04-09 DIAGNOSIS — Z79.4 TYPE 2 DIABETES MELLITUS WITHOUT COMPLICATION, WITH LONG-TERM CURRENT USE OF INSULIN (HCC): ICD-10-CM

## 2024-04-09 DIAGNOSIS — E11.9 TYPE 2 DIABETES MELLITUS WITHOUT COMPLICATION, WITH LONG-TERM CURRENT USE OF INSULIN (HCC): ICD-10-CM

## 2024-04-09 LAB
HBA1C MFR BLD: 6.9 % (ref ?–5.8)
POCT INT CON NEG: NEGATIVE
POCT INT CON POS: POSITIVE

## 2024-04-09 PROCEDURE — 99211 OFF/OP EST MAY X REQ PHY/QHP: CPT | Performed by: NURSE PRACTITIONER

## 2024-04-09 PROCEDURE — 83036 HEMOGLOBIN GLYCOSYLATED A1C: CPT | Performed by: NURSE PRACTITIONER

## 2024-04-09 RX ORDER — TESTOSTERONE CYPIONATE 200 MG/ML
INJECTION, SOLUTION INTRAMUSCULAR
COMMUNITY
Start: 2024-03-18
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
## 2024-05-14 ENCOUNTER — TELEPHONE (OUTPATIENT)
Dept: HEALTH INFORMATION MANAGEMENT | Facility: OTHER | Age: 69
End: 2024-05-14
Payer: MEDICARE

## 2024-05-14 ENCOUNTER — DOCUMENTATION (OUTPATIENT)
Dept: HEALTH INFORMATION MANAGEMENT | Facility: OTHER | Age: 69
End: 2024-05-14
Payer: MEDICARE

## 2024-05-29 ENCOUNTER — APPOINTMENT (OUTPATIENT)
Dept: MEDICAL GROUP | Facility: PHYSICIAN GROUP | Age: 69
End: 2024-05-29
Payer: MEDICARE

## 2024-06-04 ENCOUNTER — APPOINTMENT (OUTPATIENT)
Dept: MEDICAL GROUP | Facility: PHYSICIAN GROUP | Age: 69
End: 2024-06-04
Payer: MEDICARE

## 2024-07-26 SDOH — ECONOMIC STABILITY: HOUSING INSECURITY: IN THE LAST 12 MONTHS, HOW MANY PLACES HAVE YOU LIVED?: 1

## 2024-07-26 SDOH — HEALTH STABILITY: PHYSICAL HEALTH: ON AVERAGE, HOW MANY DAYS PER WEEK DO YOU ENGAGE IN MODERATE TO STRENUOUS EXERCISE (LIKE A BRISK WALK)?: 2 DAYS

## 2024-07-26 SDOH — ECONOMIC STABILITY: INCOME INSECURITY: IN THE LAST 12 MONTHS, WAS THERE A TIME WHEN YOU WERE NOT ABLE TO PAY THE MORTGAGE OR RENT ON TIME?: NO

## 2024-07-26 SDOH — ECONOMIC STABILITY: INCOME INSECURITY: HOW HARD IS IT FOR YOU TO PAY FOR THE VERY BASICS LIKE FOOD, HOUSING, MEDICAL CARE, AND HEATING?: NOT VERY HARD

## 2024-07-26 SDOH — ECONOMIC STABILITY: FOOD INSECURITY: WITHIN THE PAST 12 MONTHS, THE FOOD YOU BOUGHT JUST DIDN'T LAST AND YOU DIDN'T HAVE MONEY TO GET MORE.: NEVER TRUE

## 2024-07-26 SDOH — ECONOMIC STABILITY: FOOD INSECURITY: WITHIN THE PAST 12 MONTHS, YOU WORRIED THAT YOUR FOOD WOULD RUN OUT BEFORE YOU GOT MONEY TO BUY MORE.: NEVER TRUE

## 2024-07-26 SDOH — HEALTH STABILITY: PHYSICAL HEALTH: ON AVERAGE, HOW MANY MINUTES DO YOU ENGAGE IN EXERCISE AT THIS LEVEL?: 50 MIN

## 2024-07-26 ASSESSMENT — LIFESTYLE VARIABLES
SKIP TO QUESTIONS 9-10: 1
HOW OFTEN DO YOU HAVE SIX OR MORE DRINKS ON ONE OCCASION: NEVER
AUDIT-C TOTAL SCORE: 1
HOW MANY STANDARD DRINKS CONTAINING ALCOHOL DO YOU HAVE ON A TYPICAL DAY: 1 OR 2
HOW OFTEN DO YOU HAVE A DRINK CONTAINING ALCOHOL: MONTHLY OR LESS

## 2024-07-26 ASSESSMENT — SOCIAL DETERMINANTS OF HEALTH (SDOH)
HOW OFTEN DO YOU ATTENT MEETINGS OF THE CLUB OR ORGANIZATION YOU BELONG TO?: 1 TO 4 TIMES PER YEAR
HOW OFTEN DO YOU ATTENT MEETINGS OF THE CLUB OR ORGANIZATION YOU BELONG TO?: 1 TO 4 TIMES PER YEAR
HOW OFTEN DO YOU GET TOGETHER WITH FRIENDS OR RELATIVES?: TWICE A WEEK
HOW MANY DRINKS CONTAINING ALCOHOL DO YOU HAVE ON A TYPICAL DAY WHEN YOU ARE DRINKING: 1 OR 2
DO YOU BELONG TO ANY CLUBS OR ORGANIZATIONS SUCH AS CHURCH GROUPS UNIONS, FRATERNAL OR ATHLETIC GROUPS, OR SCHOOL GROUPS?: NO
HOW OFTEN DO YOU HAVE SIX OR MORE DRINKS ON ONE OCCASION: NEVER
HOW HARD IS IT FOR YOU TO PAY FOR THE VERY BASICS LIKE FOOD, HOUSING, MEDICAL CARE, AND HEATING?: NOT VERY HARD
HOW OFTEN DO YOU ATTEND CHURCH OR RELIGIOUS SERVICES?: NEVER
IN A TYPICAL WEEK, HOW MANY TIMES DO YOU TALK ON THE PHONE WITH FAMILY, FRIENDS, OR NEIGHBORS?: MORE THAN THREE TIMES A WEEK
HOW OFTEN DO YOU GET TOGETHER WITH FRIENDS OR RELATIVES?: TWICE A WEEK
HOW OFTEN DO YOU ATTEND CHURCH OR RELIGIOUS SERVICES?: NEVER
IN A TYPICAL WEEK, HOW MANY TIMES DO YOU TALK ON THE PHONE WITH FAMILY, FRIENDS, OR NEIGHBORS?: MORE THAN THREE TIMES A WEEK
IN THE PAST 12 MONTHS, HAS THE ELECTRIC, GAS, OIL, OR WATER COMPANY THREATENED TO SHUT OFF SERVICE IN YOUR HOME?: NO
DO YOU BELONG TO ANY CLUBS OR ORGANIZATIONS SUCH AS CHURCH GROUPS UNIONS, FRATERNAL OR ATHLETIC GROUPS, OR SCHOOL GROUPS?: NO
WITHIN THE PAST 12 MONTHS, YOU WORRIED THAT YOUR FOOD WOULD RUN OUT BEFORE YOU GOT THE MONEY TO BUY MORE: NEVER TRUE
HOW OFTEN DO YOU HAVE A DRINK CONTAINING ALCOHOL: MONTHLY OR LESS

## 2024-07-29 ENCOUNTER — OFFICE VISIT (OUTPATIENT)
Dept: MEDICAL GROUP | Facility: PHYSICIAN GROUP | Age: 69
End: 2024-07-29
Payer: MEDICARE

## 2024-07-29 ENCOUNTER — HOSPITAL ENCOUNTER (OUTPATIENT)
Facility: MEDICAL CENTER | Age: 69
End: 2024-07-29
Attending: NURSE PRACTITIONER
Payer: MEDICARE

## 2024-07-29 ENCOUNTER — HOSPITAL ENCOUNTER (OUTPATIENT)
Dept: LAB | Facility: MEDICAL CENTER | Age: 69
End: 2024-07-29
Attending: PHYSICIAN ASSISTANT
Payer: MEDICARE

## 2024-07-29 VITALS
WEIGHT: 217 LBS | OXYGEN SATURATION: 95 % | DIASTOLIC BLOOD PRESSURE: 62 MMHG | SYSTOLIC BLOOD PRESSURE: 120 MMHG | BODY MASS INDEX: 30.38 KG/M2 | HEART RATE: 85 BPM | TEMPERATURE: 98 F | HEIGHT: 71 IN

## 2024-07-29 DIAGNOSIS — Z79.4 TYPE 2 DIABETES MELLITUS WITHOUT COMPLICATION, WITH LONG-TERM CURRENT USE OF INSULIN (HCC): ICD-10-CM

## 2024-07-29 DIAGNOSIS — E11.9 TYPE 2 DIABETES MELLITUS WITHOUT COMPLICATION, WITH LONG-TERM CURRENT USE OF INSULIN (HCC): ICD-10-CM

## 2024-07-29 DIAGNOSIS — E66.9 OBESITY (BMI 30-39.9): ICD-10-CM

## 2024-07-29 DIAGNOSIS — N40.1 BENIGN PROSTATIC HYPERPLASIA WITH NOCTURIA: ICD-10-CM

## 2024-07-29 DIAGNOSIS — Z76.89 ENCOUNTER TO ESTABLISH CARE: ICD-10-CM

## 2024-07-29 DIAGNOSIS — Z12.12 SCREENING FOR COLORECTAL CANCER: ICD-10-CM

## 2024-07-29 DIAGNOSIS — I10 PRIMARY HYPERTENSION: ICD-10-CM

## 2024-07-29 DIAGNOSIS — R79.89 LOW TESTOSTERONE: ICD-10-CM

## 2024-07-29 DIAGNOSIS — Z12.11 SCREENING FOR COLORECTAL CANCER: ICD-10-CM

## 2024-07-29 DIAGNOSIS — E78.2 MIXED HYPERLIPIDEMIA: ICD-10-CM

## 2024-07-29 DIAGNOSIS — R35.1 BENIGN PROSTATIC HYPERPLASIA WITH NOCTURIA: ICD-10-CM

## 2024-07-29 DIAGNOSIS — N52.9 ERECTILE DISORDER: ICD-10-CM

## 2024-07-29 PROBLEM — R55 SYNCOPE AND COLLAPSE: Status: RESOLVED | Noted: 2023-10-07 | Resolved: 2024-07-29

## 2024-07-29 PROBLEM — N17.9 ACUTE KIDNEY INJURY (HCC): Status: RESOLVED | Noted: 2021-06-08 | Resolved: 2024-07-29

## 2024-07-29 LAB
CREAT UR-MCNC: 78.25 MG/DL
HCT VFR BLD AUTO: 55.4 % (ref 42–52)
HGB BLD-MCNC: 18.1 G/DL (ref 14–18)
MICROALBUMIN UR-MCNC: <1.2 MG/DL
MICROALBUMIN/CREAT UR: NORMAL MG/G (ref 0–30)

## 2024-07-29 PROCEDURE — 82043 UR ALBUMIN QUANTITATIVE: CPT

## 2024-07-29 PROCEDURE — 82570 ASSAY OF URINE CREATININE: CPT

## 2024-07-29 PROCEDURE — 36415 COLL VENOUS BLD VENIPUNCTURE: CPT

## 2024-07-29 PROCEDURE — 85014 HEMATOCRIT: CPT

## 2024-07-29 PROCEDURE — 85018 HEMOGLOBIN: CPT

## 2024-07-29 ASSESSMENT — PATIENT HEALTH QUESTIONNAIRE - PHQ9: CLINICAL INTERPRETATION OF PHQ2 SCORE: 0

## 2024-07-29 ASSESSMENT — FIBROSIS 4 INDEX: FIB4 SCORE: 1.4

## 2024-07-31 ENCOUNTER — HOSPITAL ENCOUNTER (OUTPATIENT)
Dept: LAB | Facility: MEDICAL CENTER | Age: 69
End: 2024-07-31
Attending: PHYSICIAN ASSISTANT
Payer: MEDICARE

## 2024-07-31 LAB — TESTOST SERPL-MCNC: 754 NG/DL (ref 175–781)

## 2024-07-31 PROCEDURE — 84153 ASSAY OF PSA TOTAL: CPT | Mod: GA

## 2024-07-31 PROCEDURE — 36415 COLL VENOUS BLD VENIPUNCTURE: CPT | Mod: GA

## 2024-07-31 PROCEDURE — 84403 ASSAY OF TOTAL TESTOSTERONE: CPT

## 2024-08-01 LAB — PSA SERPL-MCNC: 0.82 NG/ML (ref 0–4)

## 2024-09-09 ENCOUNTER — APPOINTMENT (OUTPATIENT)
Dept: MEDICAL GROUP | Facility: PHYSICIAN GROUP | Age: 69
End: 2024-09-09
Payer: MEDICARE

## 2024-09-10 ENCOUNTER — OFFICE VISIT (OUTPATIENT)
Dept: MEDICAL GROUP | Facility: PHYSICIAN GROUP | Age: 69
End: 2024-09-10
Payer: MEDICARE

## 2024-09-10 VITALS
SYSTOLIC BLOOD PRESSURE: 128 MMHG | HEIGHT: 71 IN | OXYGEN SATURATION: 97 % | DIASTOLIC BLOOD PRESSURE: 72 MMHG | BODY MASS INDEX: 30.24 KG/M2 | RESPIRATION RATE: 16 BRPM | WEIGHT: 216 LBS | TEMPERATURE: 97.7 F | HEART RATE: 66 BPM

## 2024-09-10 DIAGNOSIS — I10 PRIMARY HYPERTENSION: ICD-10-CM

## 2024-09-10 DIAGNOSIS — R05.3 CHRONIC COUGH: ICD-10-CM

## 2024-09-10 PROCEDURE — 99213 OFFICE O/P EST LOW 20 MIN: CPT | Performed by: STUDENT IN AN ORGANIZED HEALTH CARE EDUCATION/TRAINING PROGRAM

## 2024-09-10 PROCEDURE — 3078F DIAST BP <80 MM HG: CPT | Performed by: STUDENT IN AN ORGANIZED HEALTH CARE EDUCATION/TRAINING PROGRAM

## 2024-09-10 PROCEDURE — 3074F SYST BP LT 130 MM HG: CPT | Performed by: STUDENT IN AN ORGANIZED HEALTH CARE EDUCATION/TRAINING PROGRAM

## 2024-09-10 RX ORDER — FLUTICASONE PROPIONATE 50 MCG
1 SPRAY, SUSPENSION (ML) NASAL 2 TIMES DAILY
Qty: 16 G | Refills: 0 | Status: SHIPPED | OUTPATIENT
Start: 2024-09-10

## 2024-09-10 ASSESSMENT — FIBROSIS 4 INDEX: FIB4 SCORE: 1.4

## 2024-09-10 NOTE — PROGRESS NOTES
"Subjective:   Verbal consent was acquired by the patient to use [employer] ambient listening note generation during this visit Yes     CC: Cough    History of Present Illness  Mr. Reyes is a pleasant 70 yo established patient of [facility] who presents today for a persistent cough.    He experienced a mild flu in the early part of last month, which he initially suspected to be COVID-19, but tested negative. The symptoms subsided after a week, but intermittent coughing spells have persisted since then. His current cough is dry, unlike the phlegm-filled cough he had during his illness. The cough does not disturb his sleep but occurs at various times during the day.    He has a history of pneumonia from 2000, which left his lungs scarred. He reports no fevers or chills, but had nasal congestion and a runny nose when he was ill. He also reports no shortness of breath or chest pain.    Additionally, he is seeking a DMV sheet for a handicap placard.    Patient Active Problem List    Diagnosis Date Noted    Low testosterone 07/29/2024    Erectile disorder 10/07/2023    Leukemoid reaction 10/07/2023    Vitamin D deficiency 12/13/2022    Morbid (severe) obesity due to excess calories (HCC) 09/07/2022    History of surgery 06/08/2021    Urinary tract infectious disease 06/08/2021    Hypertensive disorder 06/08/2021    Left knee pain 07/30/2020    Elevated PSA 12/02/2019    Obesity (BMI 30-39.9) 04/24/2018    H/O back injury 04/24/2018    BPH (benign prostatic hyperplasia) 03/11/2010    Type 2 diabetes mellitus without complication, with long-term current use of insulin (HCC) 11/04/2009    Hyperlipidemia 11/04/2009     Health Maintenance: Completed    ROS:    Objective:     Exam:  /72   Pulse 66   Temp 36.5 °C (97.7 °F)   Resp 16   Ht 1.803 m (5' 11\")   Wt 98 kg (216 lb)   SpO2 97%   BMI 30.13 kg/m²  Body mass index is 30.13 kg/m².    Physical Exam  Constitutional:       Appearance: Normal appearance. "   Cardiovascular:      Rate and Rhythm: Normal rate and regular rhythm.   Pulmonary:      Effort: Pulmonary effort is normal. No respiratory distress.      Breath sounds: Normal breath sounds. No stridor or decreased air movement. No decreased breath sounds, wheezing or rhonchi.   Neurological:      Mental Status: He is alert.             Assessment & Plan:     69 y.o. male with the following -     1. Chronic cough  Chronic, ongoing.  Patient presenting with more than 3-week history of dry non-productive cough. Patient reports he had an upper respiratory infection in 8/2024. He currently denies having fevers/chills. On exam lungs were clear to auscultation bilaterally.  Patient's cough is likely secondary to postnasal drip.  Patient was advised to use Flonase twice daily for the next several days.  If patient's symptoms worsen will consider obtaining chest x-ray.  - fluticasone (FLONASE) 50 MCG/ACT nasal spray; Administer 1 Spray into affected nostril(S) 2 times a day.  Dispense: 16 g; Refill: 0    2. Primary hypertension  Chronic, controlled.  Blood pressure at goal today.  Continue lisinopril 10 mg daily.        Return if symptoms worsen or fail to improve.    Please note that this dictation was created using voice recognition software. I have made every reasonable attempt to correct obvious errors, but I expect that there are errors of grammar and possibly content that I did not discover before finalizing the note.

## 2024-09-30 ENCOUNTER — OFFICE VISIT (OUTPATIENT)
Dept: MEDICAL GROUP | Facility: PHYSICIAN GROUP | Age: 69
End: 2024-09-30
Payer: MEDICARE

## 2024-09-30 VITALS
SYSTOLIC BLOOD PRESSURE: 104 MMHG | HEIGHT: 71 IN | BODY MASS INDEX: 29.96 KG/M2 | WEIGHT: 214 LBS | DIASTOLIC BLOOD PRESSURE: 64 MMHG | TEMPERATURE: 97.4 F | OXYGEN SATURATION: 95 % | HEART RATE: 73 BPM

## 2024-09-30 DIAGNOSIS — E11.9 TYPE 2 DIABETES MELLITUS WITHOUT COMPLICATION, WITH LONG-TERM CURRENT USE OF INSULIN (HCC): ICD-10-CM

## 2024-09-30 DIAGNOSIS — Z79.4 TYPE 2 DIABETES MELLITUS WITHOUT COMPLICATION, WITH LONG-TERM CURRENT USE OF INSULIN (HCC): ICD-10-CM

## 2024-09-30 DIAGNOSIS — Z02.89 ENCOUNTER FOR COMPLETION OF FORM WITH PATIENT: ICD-10-CM

## 2024-09-30 ASSESSMENT — FIBROSIS 4 INDEX: FIB4 SCORE: 1.4

## 2024-09-30 NOTE — ASSESSMENT & PLAN NOTE
Continues Trulicity 3 mg every 7 days, Jardiance 25 mg daily, metformin 1000 mg twice daily and Lantus 26 units every evening.  Home blood sugars are .  He has upcoming appointment pharmacotherapy.  Due for monofilament today.
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
## 2024-10-02 DIAGNOSIS — R05.3 CHRONIC COUGH: ICD-10-CM

## 2024-10-03 DIAGNOSIS — R05.3 CHRONIC COUGH: ICD-10-CM

## 2024-10-03 RX ORDER — FLUTICASONE PROPIONATE 50 MCG
1 SPRAY, SUSPENSION (ML) NASAL 2 TIMES DAILY
Qty: 16 ML | Refills: 3 | Status: SHIPPED | OUTPATIENT
Start: 2024-10-03 | End: 2024-10-03

## 2024-10-03 RX ORDER — FLUTICASONE PROPIONATE 50 MCG
1 SPRAY, SUSPENSION (ML) NASAL 2 TIMES DAILY
Qty: 48 ML | Refills: 2 | Status: SHIPPED | OUTPATIENT
Start: 2024-10-03

## 2024-10-08 ENCOUNTER — OFFICE VISIT (OUTPATIENT)
Dept: MEDICAL GROUP | Facility: PHYSICIAN GROUP | Age: 69
End: 2024-10-08
Payer: MEDICARE

## 2024-10-08 DIAGNOSIS — E11.9 TYPE 2 DIABETES MELLITUS WITHOUT COMPLICATION, WITH LONG-TERM CURRENT USE OF INSULIN (HCC): ICD-10-CM

## 2024-10-08 DIAGNOSIS — Z79.4 TYPE 2 DIABETES MELLITUS WITHOUT COMPLICATION, WITH LONG-TERM CURRENT USE OF INSULIN (HCC): ICD-10-CM

## 2024-10-08 LAB
HBA1C MFR BLD: 7.5 % (ref ?–5.8)
POCT INT CON NEG: NEGATIVE
POCT INT CON POS: POSITIVE

## 2024-10-08 PROCEDURE — 99211 OFF/OP EST MAY X REQ PHY/QHP: CPT | Performed by: NURSE PRACTITIONER

## 2024-10-08 PROCEDURE — 83036 HEMOGLOBIN GLYCOSYLATED A1C: CPT | Performed by: NURSE PRACTITIONER

## 2024-10-15 DIAGNOSIS — E11.9 TYPE 2 DIABETES MELLITUS WITHOUT COMPLICATION, WITH LONG-TERM CURRENT USE OF INSULIN (HCC): ICD-10-CM

## 2024-10-15 DIAGNOSIS — Z79.4 TYPE 2 DIABETES MELLITUS WITHOUT COMPLICATION, WITH LONG-TERM CURRENT USE OF INSULIN (HCC): ICD-10-CM

## 2024-10-15 RX ORDER — EMPAGLIFLOZIN 25 MG/1
1 TABLET, FILM COATED ORAL DAILY
Qty: 90 TABLET | Refills: 3 | Status: SHIPPED | OUTPATIENT
Start: 2024-10-15

## 2024-10-28 ENCOUNTER — APPOINTMENT (OUTPATIENT)
Dept: MEDICAL GROUP | Facility: PHYSICIAN GROUP | Age: 69
End: 2024-10-28
Payer: MEDICARE

## 2024-11-27 ENCOUNTER — APPOINTMENT (OUTPATIENT)
Dept: MEDICAL GROUP | Facility: PHYSICIAN GROUP | Age: 69
End: 2024-11-27
Payer: MEDICARE

## 2024-12-04 ENCOUNTER — HOSPITAL ENCOUNTER (OUTPATIENT)
Dept: LAB | Facility: MEDICAL CENTER | Age: 69
End: 2024-12-04
Attending: PHYSICIAN ASSISTANT
Payer: MEDICARE

## 2024-12-04 ENCOUNTER — HOSPITAL ENCOUNTER (OUTPATIENT)
Dept: LAB | Facility: MEDICAL CENTER | Age: 69
End: 2024-12-04
Attending: NURSE PRACTITIONER
Payer: MEDICARE

## 2024-12-04 DIAGNOSIS — E11.9 TYPE 2 DIABETES MELLITUS WITHOUT COMPLICATION, WITH LONG-TERM CURRENT USE OF INSULIN (HCC): ICD-10-CM

## 2024-12-04 DIAGNOSIS — Z79.4 TYPE 2 DIABETES MELLITUS WITHOUT COMPLICATION, WITH LONG-TERM CURRENT USE OF INSULIN (HCC): ICD-10-CM

## 2024-12-04 DIAGNOSIS — E78.2 MIXED HYPERLIPIDEMIA: ICD-10-CM

## 2024-12-04 LAB
ALBUMIN SERPL BCP-MCNC: 4.4 G/DL (ref 3.2–4.9)
ALBUMIN/GLOB SERPL: 1.7 G/DL
ALP SERPL-CCNC: 57 U/L (ref 30–99)
ALT SERPL-CCNC: 25 U/L (ref 2–50)
ANION GAP SERPL CALC-SCNC: 11 MMOL/L (ref 7–16)
AST SERPL-CCNC: 23 U/L (ref 12–45)
BILIRUB SERPL-MCNC: 0.8 MG/DL (ref 0.1–1.5)
BUN SERPL-MCNC: 24 MG/DL (ref 8–22)
CALCIUM ALBUM COR SERPL-MCNC: 9.4 MG/DL (ref 8.5–10.5)
CALCIUM SERPL-MCNC: 9.7 MG/DL (ref 8.5–10.5)
CHLORIDE SERPL-SCNC: 102 MMOL/L (ref 96–112)
CHOLEST SERPL-MCNC: 104 MG/DL (ref 100–199)
CO2 SERPL-SCNC: 25 MMOL/L (ref 20–33)
CREAT SERPL-MCNC: 1.09 MG/DL (ref 0.5–1.4)
FASTING STATUS PATIENT QL REPORTED: NORMAL
GFR SERPLBLD CREATININE-BSD FMLA CKD-EPI: 73 ML/MIN/1.73 M 2
GLOBULIN SER CALC-MCNC: 2.6 G/DL (ref 1.9–3.5)
GLUCOSE SERPL-MCNC: 127 MG/DL (ref 65–99)
HCT VFR BLD AUTO: 57.4 % (ref 42–52)
HDLC SERPL-MCNC: 29 MG/DL
HGB BLD-MCNC: 18.5 G/DL (ref 14–18)
LDLC SERPL CALC-MCNC: 36 MG/DL
POTASSIUM SERPL-SCNC: 4.8 MMOL/L (ref 3.6–5.5)
PROT SERPL-MCNC: 7 G/DL (ref 6–8.2)
PSA SERPL DL<=0.01 NG/ML-MCNC: 0.72 NG/ML (ref 0–4)
SODIUM SERPL-SCNC: 138 MMOL/L (ref 135–145)
TESTOST SERPL-MCNC: 782 NG/DL (ref 175–781)
TRIGL SERPL-MCNC: 197 MG/DL (ref 0–149)

## 2024-12-04 PROCEDURE — 80053 COMPREHEN METABOLIC PANEL: CPT

## 2024-12-04 PROCEDURE — 85018 HEMOGLOBIN: CPT

## 2024-12-04 PROCEDURE — 36415 COLL VENOUS BLD VENIPUNCTURE: CPT

## 2024-12-04 PROCEDURE — 84153 ASSAY OF PSA TOTAL: CPT | Mod: GA

## 2024-12-04 PROCEDURE — 80061 LIPID PANEL: CPT

## 2024-12-04 PROCEDURE — 84403 ASSAY OF TOTAL TESTOSTERONE: CPT

## 2024-12-04 PROCEDURE — 85014 HEMATOCRIT: CPT

## 2024-12-12 ENCOUNTER — APPOINTMENT (OUTPATIENT)
Dept: MEDICAL GROUP | Facility: PHYSICIAN GROUP | Age: 69
End: 2024-12-12
Payer: MEDICARE

## 2024-12-12 VITALS
HEART RATE: 69 BPM | SYSTOLIC BLOOD PRESSURE: 122 MMHG | WEIGHT: 219 LBS | OXYGEN SATURATION: 95 % | DIASTOLIC BLOOD PRESSURE: 74 MMHG | BODY MASS INDEX: 30.66 KG/M2 | HEIGHT: 71 IN | TEMPERATURE: 97.8 F

## 2024-12-12 DIAGNOSIS — E78.2 MIXED HYPERLIPIDEMIA: ICD-10-CM

## 2024-12-12 DIAGNOSIS — Z79.4 TYPE 2 DIABETES MELLITUS WITHOUT COMPLICATION, WITH LONG-TERM CURRENT USE OF INSULIN (HCC): ICD-10-CM

## 2024-12-12 DIAGNOSIS — E11.9 TYPE 2 DIABETES MELLITUS WITHOUT COMPLICATION, WITH LONG-TERM CURRENT USE OF INSULIN (HCC): ICD-10-CM

## 2024-12-12 DIAGNOSIS — I10 PRIMARY HYPERTENSION: ICD-10-CM

## 2024-12-12 DIAGNOSIS — Z23 NEED FOR VACCINATION: ICD-10-CM

## 2024-12-12 DIAGNOSIS — Z71.2 ENCOUNTER TO DISCUSS TEST RESULTS: ICD-10-CM

## 2024-12-12 PROCEDURE — G0010 ADMIN HEPATITIS B VACCINE: HCPCS | Performed by: NURSE PRACTITIONER

## 2024-12-12 PROCEDURE — 99214 OFFICE O/P EST MOD 30 MIN: CPT | Mod: 25 | Performed by: NURSE PRACTITIONER

## 2024-12-12 PROCEDURE — 90746 HEPB VACCINE 3 DOSE ADULT IM: CPT | Performed by: NURSE PRACTITIONER

## 2024-12-12 PROCEDURE — 3078F DIAST BP <80 MM HG: CPT | Performed by: NURSE PRACTITIONER

## 2024-12-12 PROCEDURE — 3074F SYST BP LT 130 MM HG: CPT | Performed by: NURSE PRACTITIONER

## 2024-12-12 RX ORDER — AMOXICILLIN 500 MG
CAPSULE ORAL
COMMUNITY
Start: 2024-12-02 | End: 2024-12-18

## 2024-12-12 ASSESSMENT — FIBROSIS 4 INDEX: FIB4 SCORE: 1.4

## 2024-12-12 NOTE — ASSESSMENT & PLAN NOTE
Had IV team came and evaluate the line on LUE. Per IV team the line is  midline f rom outside hospital. Changed on flow sheet  the L-PIV to L-midline charted accordingly.  Changed the dressing and caps as well.    Recent LDL 36 with triglycerides of 197.  Continues rosuvastatin 20 mg every evening and coenzyme Q-10.  Recent A1c elevated at 7.5%.

## 2024-12-12 NOTE — ASSESSMENT & PLAN NOTE
Followed by pharmacotherapy. 10/8/24 A1C 7.5%. He is schedule for colonoscopy tomorrow and did not take Trulicity dose this week. His diet can fluctuate his sugars. He has been working out of state recently and diet is affected. Home sugars are mostly 110-130's. He brings in paperwork for work for COVID and Hep B vaccines. 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

## 2024-12-12 NOTE — ASSESSMENT & PLAN NOTE
/74.  Continues lisinopril 10 mg daily.  Home 's/80's. Denies chest pain, shortness of breath or dizziness.

## 2024-12-18 ENCOUNTER — OFFICE VISIT (OUTPATIENT)
Dept: MEDICAL GROUP | Facility: PHYSICIAN GROUP | Age: 69
End: 2024-12-18
Payer: MEDICARE

## 2024-12-18 ENCOUNTER — APPOINTMENT (OUTPATIENT)
Dept: URGENT CARE | Facility: PHYSICIAN GROUP | Age: 69
End: 2024-12-18
Payer: MEDICARE

## 2024-12-18 VITALS
SYSTOLIC BLOOD PRESSURE: 102 MMHG | OXYGEN SATURATION: 98 % | BODY MASS INDEX: 30.66 KG/M2 | WEIGHT: 219 LBS | DIASTOLIC BLOOD PRESSURE: 70 MMHG | HEIGHT: 71 IN | HEART RATE: 77 BPM

## 2024-12-18 DIAGNOSIS — R41.3 MEMORY LOSS: ICD-10-CM

## 2024-12-18 PROCEDURE — 99214 OFFICE O/P EST MOD 30 MIN: CPT | Performed by: STUDENT IN AN ORGANIZED HEALTH CARE EDUCATION/TRAINING PROGRAM

## 2024-12-18 PROCEDURE — 3074F SYST BP LT 130 MM HG: CPT | Performed by: STUDENT IN AN ORGANIZED HEALTH CARE EDUCATION/TRAINING PROGRAM

## 2024-12-18 PROCEDURE — 3078F DIAST BP <80 MM HG: CPT | Performed by: STUDENT IN AN ORGANIZED HEALTH CARE EDUCATION/TRAINING PROGRAM

## 2024-12-18 ASSESSMENT — ENCOUNTER SYMPTOMS
CHILLS: 0
SHORTNESS OF BREATH: 0
FEVER: 0
PALPITATIONS: 0

## 2024-12-18 ASSESSMENT — FIBROSIS 4 INDEX: FIB4 SCORE: 1.4

## 2024-12-18 NOTE — PROGRESS NOTES
Subjective:   Verbal consent was acquired by the patient to use [employer] ambient listening note generation during this visit Yes     CC: Memory loss    History of Present Illness  Mr. Reyes is a pleasant 70 yo established patient of Elizabeth [facility] who presents today for  memory loss.    He reports an inability to recall recent events, specifically within the past week to 10 days. He mentions a particular incident where he purchased 2 e-bikes from a couple in [place] but can not remember the details of the transaction or their faces. Additionally, he recounts an episode where he was unable to recall the name of a hospital in [place], despite it being familiar to him. This memory lapse was brought to his attention by his work partner during a phone conversation. He also mentions a past disagreement with his partner over a personal relationship, which he has only a vague recollection of. His partner expressed concern about his health, suggesting the possibility of a minor stroke. He reports no changes in speech, unilateral weakness, headaches, or nausea. He attributes his memory issues to his age and reports no history of head trauma. He also reports no dizziness or visual changes. He does not have a pacemaker. He has not experienced any recent stressors.    He recalls an incident on 10/30/2024, where he noticed a small bubble in his vision that moves with his eye movements. This visual disturbance was not present during his last eye examination earlier this year.    Supplemental Information  He experienced an unusual sleep pattern last night, waking up at 3:00 AM for an unknown reason, then falling back asleep after an hour, only to wake up again a few hours later. He occasionally experiences such disruptions in his sleep.    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    MEDICATIONS  Current: vitamin D, coenzyme Q10, Trulicity, Jardiance, Flonase, insulin, lisinopril, magnesium oxide,  "metformin, multivitamin, rosuvastatin 20 mg, Cialis  Discontinued: testosterone, glipizide    Patient Active Problem List    Diagnosis Date Noted    Low testosterone 07/29/2024    Erectile disorder 10/07/2023    Leukemoid reaction 10/07/2023    Vitamin D deficiency 12/13/2022    Morbid (severe) obesity due to excess calories ([facility]) 09/07/2022    History of surgery 06/08/2021    Urinary tract infectious disease 06/08/2021    Primary hypertension 06/08/2021    Left knee pain 07/30/2020    Elevated PSA 12/02/2019    Obesity (BMI 30-39.9) 04/24/2018    H/O back injury 04/24/2018    BPH (benign prostatic hyperplasia) 03/11/2010    Type 2 diabetes mellitus without complication, with long-term current use of insulin ([facility]) 11/04/2009    Hyperlipidemia 11/04/2009         Health Maintenance: Completed    ROS:  Review of Systems   Constitutional:  Negative for chills and fever.   Respiratory:  Negative for shortness of breath.    Cardiovascular:  Negative for chest pain and palpitations.       Objective:     Exam:  /70   Pulse 77   Ht 1.803 m (5' 11\")   Wt 99.3 kg (219 lb)   SpO2 98%   BMI 30.54 kg/m²  Body mass index is 30.54 kg/m².    Physical Exam  Eyes:      Extraocular Movements: Extraocular movements intact.   Neurological:      General: No focal deficit present.      Mental Status: He is alert.      Cranial Nerves: Cranial nerves 2-12 are intact. No cranial nerve deficit or facial asymmetry.      Sensory: Sensation is intact.      Motor: Motor function is intact. No weakness.      Coordination: Romberg sign negative. Coordination normal. Finger-Nose-Finger Test abnormal. Heel to Parrish Test normal.      Gait: Gait is intact.   Psychiatric:         Mood and Affect: Mood normal.               Assessment & Plan:     69 y.o. male with the following -     1. Memory loss  Acute, minimal.  Patient reports over the past several days he has had a few incidents where he is unable to recall recent events.  He reports that " this is concerning for him.  He states after discussing with a friend it was brought up that he may possibly have had a TIA.  Per up-to-date global amnesia could be a sign of TIA.  Patient's neurological exam was normal.  Patient reports he did have anesthesia a week ago for colonoscopy and he thinks that may have contributed to the symptoms.  However to rule out TIA will obtain brain MRI.  Stat order was placed.  Patient is scheduled to have this completed on 12/19/2024.  - MR-BRAIN-W/O; Future      Return if symptoms worsen or fail to improve.    Please note that this dictation was created using voice recognition software. I have made every reasonable attempt to correct obvious errors, but I expect that there are errors of grammar and possibly content that I did not discover before finalizing the note.

## 2024-12-19 ENCOUNTER — HOSPITAL ENCOUNTER (OUTPATIENT)
Dept: RADIOLOGY | Facility: MEDICAL CENTER | Age: 69
End: 2024-12-19
Attending: STUDENT IN AN ORGANIZED HEALTH CARE EDUCATION/TRAINING PROGRAM
Payer: MEDICARE

## 2024-12-19 DIAGNOSIS — R41.3 MEMORY LOSS: ICD-10-CM

## 2024-12-19 PROCEDURE — 70551 MRI BRAIN STEM W/O DYE: CPT

## 2024-12-23 DIAGNOSIS — I10 PRIMARY HYPERTENSION: ICD-10-CM

## 2024-12-23 DIAGNOSIS — E78.2 MIXED HYPERLIPIDEMIA: ICD-10-CM

## 2024-12-23 DIAGNOSIS — Z79.4 TYPE 2 DIABETES MELLITUS WITHOUT COMPLICATION, WITH LONG-TERM CURRENT USE OF INSULIN (HCC): ICD-10-CM

## 2024-12-23 DIAGNOSIS — E11.9 TYPE 2 DIABETES MELLITUS WITHOUT COMPLICATION, WITH LONG-TERM CURRENT USE OF INSULIN (HCC): ICD-10-CM

## 2024-12-23 RX ORDER — ROSUVASTATIN CALCIUM 20 MG/1
20 TABLET, COATED ORAL EVERY EVENING
Qty: 90 TABLET | Refills: 3 | Status: SHIPPED | OUTPATIENT
Start: 2024-12-23

## 2024-12-23 RX ORDER — LISINOPRIL 10 MG/1
10 TABLET ORAL DAILY
Qty: 90 TABLET | Refills: 3 | Status: SHIPPED | OUTPATIENT
Start: 2024-12-23

## 2024-12-24 NOTE — TELEPHONE ENCOUNTER
Requested Prescriptions     Signed Prescriptions Disp Refills    rosuvastatin (CRESTOR) 20 MG Tab 90 Tablet 3     Sig: Take 1 Tablet by mouth every evening.     Authorizing Provider: JAMES VELEZ    lisinopril (PRINIVIL) 10 MG Tab 90 Tablet 3     Sig: Take 1 Tablet by mouth every day.     Authorizing Provider: JAMES VELEZ    metformin (GLUCOPHAGE) 1000 MG tablet 180 Tablet 3     Sig: Take 1 Tablet by mouth 2 times a day with meals.     Authorizing Provider: JAMES VELEZ A.P.R.N.

## 2024-12-24 NOTE — TELEPHONE ENCOUNTER
Received request via: Pharmacy    Was the patient seen in the last year in this department? Yes    Does the patient have an active prescription (recently filled or refills available) for medication(s) requested? No    [facility]    Does the patient have correction Plus and need 100-day supply? (This applies to ALL medications) Patient does not have SCP

## 2024-12-30 DIAGNOSIS — Z79.4 TYPE 2 DIABETES MELLITUS WITHOUT COMPLICATION, WITH LONG-TERM CURRENT USE OF INSULIN (HCC): ICD-10-CM

## 2024-12-30 DIAGNOSIS — E11.9 TYPE 2 DIABETES MELLITUS WITHOUT COMPLICATION, WITH LONG-TERM CURRENT USE OF INSULIN (HCC): ICD-10-CM

## 2024-12-30 RX ORDER — DULAGLUTIDE 3 MG/.5ML
3 INJECTION, SOLUTION SUBCUTANEOUS
Qty: 6 ML | Refills: 3 | Status: SHIPPED | OUTPATIENT
Start: 2024-12-30

## 2024-12-30 NOTE — TELEPHONE ENCOUNTER
Received request via: Patient    Was the patient seen in the last year in this department? Yes    Does the patient have an active prescription (recently filled or refills available) for medication(s) requested? No    [facility]    Does the patient have jail Plus and need 100-day supply? (This applies to ALL medications) Patient does not have SCP

## 2024-12-31 NOTE — TELEPHONE ENCOUNTER
Requested Prescriptions     Signed Prescriptions Disp Refills    Dulaglutide (TRULICITY) 3 MG/0.5ML Solution Auto-injector 6 mL 3     Sig: Inject 3 mg under the skin every 7 days.     Authorizing Provider: JAMES VELEZ A.P.R.N.

## 2025-01-07 ENCOUNTER — OFFICE VISIT (OUTPATIENT)
Dept: MEDICAL GROUP | Facility: PHYSICIAN GROUP | Age: 70
End: 2025-01-07
Payer: MEDICARE

## 2025-01-07 VITALS
RESPIRATION RATE: 14 BRPM | HEART RATE: 71 BPM | OXYGEN SATURATION: 97 % | WEIGHT: 218.92 LBS | HEIGHT: 71 IN | BODY MASS INDEX: 30.65 KG/M2

## 2025-01-07 DIAGNOSIS — Z79.4 TYPE 2 DIABETES MELLITUS WITHOUT COMPLICATION, WITH LONG-TERM CURRENT USE OF INSULIN (HCC): ICD-10-CM

## 2025-01-07 DIAGNOSIS — E11.9 TYPE 2 DIABETES MELLITUS WITHOUT COMPLICATION, WITH LONG-TERM CURRENT USE OF INSULIN (HCC): ICD-10-CM

## 2025-01-07 PROCEDURE — 99211 OFF/OP EST MAY X REQ PHY/QHP: CPT | Performed by: STUDENT IN AN ORGANIZED HEALTH CARE EDUCATION/TRAINING PROGRAM

## 2025-01-07 ASSESSMENT — FIBROSIS 4 INDEX: FIB4 SCORE: 1.4
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
## 2025-01-29 ENCOUNTER — HOSPITAL ENCOUNTER (OUTPATIENT)
Dept: LAB | Facility: MEDICAL CENTER | Age: 70
End: 2025-01-29
Attending: UROLOGY
Payer: MEDICARE

## 2025-01-29 LAB
BASOPHILS # BLD AUTO: 0.9 % (ref 0–1.8)
BASOPHILS # BLD: 0.07 K/UL (ref 0–0.12)
EOSINOPHIL # BLD AUTO: 0.23 K/UL (ref 0–0.51)
EOSINOPHIL NFR BLD: 2.9 % (ref 0–6.9)
ERYTHROCYTE [DISTWIDTH] IN BLOOD BY AUTOMATED COUNT: 44 FL (ref 35.9–50)
HCT VFR BLD AUTO: 54.4 % (ref 42–52)
HGB BLD-MCNC: 18.2 G/DL (ref 14–18)
IMM GRANULOCYTES # BLD AUTO: 0.03 K/UL (ref 0–0.11)
IMM GRANULOCYTES NFR BLD AUTO: 0.4 % (ref 0–0.9)
LYMPHOCYTES # BLD AUTO: 2.21 K/UL (ref 1–4.8)
LYMPHOCYTES NFR BLD: 27.4 % (ref 22–41)
MCH RBC QN AUTO: 30.9 PG (ref 27–33)
MCHC RBC AUTO-ENTMCNC: 33.5 G/DL (ref 32.3–36.5)
MCV RBC AUTO: 92.4 FL (ref 81.4–97.8)
MONOCYTES # BLD AUTO: 0.55 K/UL (ref 0–0.85)
MONOCYTES NFR BLD AUTO: 6.8 % (ref 0–13.4)
NEUTROPHILS # BLD AUTO: 4.98 K/UL (ref 1.82–7.42)
NEUTROPHILS NFR BLD: 61.6 % (ref 44–72)
NRBC # BLD AUTO: 0 K/UL
NRBC BLD-RTO: 0 /100 WBC (ref 0–0.2)
PLATELET # BLD AUTO: 209 K/UL (ref 164–446)
PMV BLD AUTO: 10 FL (ref 9–12.9)
RBC # BLD AUTO: 5.89 M/UL (ref 4.7–6.1)
WBC # BLD AUTO: 8.1 K/UL (ref 4.8–10.8)

## 2025-01-29 PROCEDURE — 84403 ASSAY OF TOTAL TESTOSTERONE: CPT

## 2025-01-29 PROCEDURE — 85025 COMPLETE CBC W/AUTO DIFF WBC: CPT

## 2025-01-29 PROCEDURE — 36415 COLL VENOUS BLD VENIPUNCTURE: CPT

## 2025-01-30 LAB — TESTOST SERPL-MCNC: 185 NG/DL (ref 175–781)

## 2025-02-19 ENCOUNTER — HOSPITAL ENCOUNTER (OUTPATIENT)
Dept: LAB | Facility: MEDICAL CENTER | Age: 70
End: 2025-02-19
Payer: MEDICARE

## 2025-02-19 LAB
BASOPHILS # BLD AUTO: 0.9 % (ref 0–1.8)
BASOPHILS # BLD: 0.07 K/UL (ref 0–0.12)
EOSINOPHIL # BLD AUTO: 0.19 K/UL (ref 0–0.51)
EOSINOPHIL NFR BLD: 2.6 % (ref 0–6.9)
ERYTHROCYTE [DISTWIDTH] IN BLOOD BY AUTOMATED COUNT: 45.7 FL (ref 35.9–50)
HCT VFR BLD AUTO: 50.6 % (ref 42–52)
HGB BLD-MCNC: 16.6 G/DL (ref 14–18)
IMM GRANULOCYTES # BLD AUTO: 0.01 K/UL (ref 0–0.11)
IMM GRANULOCYTES NFR BLD AUTO: 0.1 % (ref 0–0.9)
LYMPHOCYTES # BLD AUTO: 2.12 K/UL (ref 1–4.8)
LYMPHOCYTES NFR BLD: 28.6 % (ref 22–41)
MCH RBC QN AUTO: 30.6 PG (ref 27–33)
MCHC RBC AUTO-ENTMCNC: 32.8 G/DL (ref 32.3–36.5)
MCV RBC AUTO: 93.2 FL (ref 81.4–97.8)
MONOCYTES # BLD AUTO: 0.52 K/UL (ref 0–0.85)
MONOCYTES NFR BLD AUTO: 7 % (ref 0–13.4)
NEUTROPHILS # BLD AUTO: 4.51 K/UL (ref 1.82–7.42)
NEUTROPHILS NFR BLD: 60.8 % (ref 44–72)
NRBC # BLD AUTO: 0 K/UL
NRBC BLD-RTO: 0 /100 WBC (ref 0–0.2)
PLATELET # BLD AUTO: 219 K/UL (ref 164–446)
PMV BLD AUTO: 10 FL (ref 9–12.9)
RBC # BLD AUTO: 5.43 M/UL (ref 4.7–6.1)
WBC # BLD AUTO: 7.4 K/UL (ref 4.8–10.8)

## 2025-02-19 PROCEDURE — 85025 COMPLETE CBC W/AUTO DIFF WBC: CPT

## 2025-02-19 PROCEDURE — 36415 COLL VENOUS BLD VENIPUNCTURE: CPT

## 2025-02-24 ENCOUNTER — PATIENT MESSAGE (OUTPATIENT)
Dept: MEDICAL GROUP | Facility: PHYSICIAN GROUP | Age: 70
End: 2025-02-24
Payer: MEDICARE

## 2025-02-24 DIAGNOSIS — E11.9 TYPE 2 DIABETES MELLITUS WITHOUT COMPLICATION, WITH LONG-TERM CURRENT USE OF INSULIN (HCC): ICD-10-CM

## 2025-02-24 DIAGNOSIS — Z79.4 TYPE 2 DIABETES MELLITUS WITHOUT COMPLICATION, WITH LONG-TERM CURRENT USE OF INSULIN (HCC): ICD-10-CM

## 2025-02-24 RX ORDER — BLOOD SUGAR DIAGNOSTIC
STRIP MISCELLANEOUS
Qty: 100 STRIP | Refills: 3 | Status: SHIPPED | OUTPATIENT
Start: 2025-02-24

## 2025-02-25 NOTE — PROGRESS NOTES
Requested Prescriptions     Signed Prescriptions Disp Refills    glucose blood (ONETOUCH ULTRA) strip 100 Strip 3     Sig: USE TO TEST BLOOD SUGAR DAILY     Authorizing Provider: JAMES VELEZ A.P.R.N.

## 2025-03-17 RX ORDER — DULAGLUTIDE 4.5 MG/.5ML
1 INJECTION, SOLUTION SUBCUTANEOUS
Qty: 6 ML | Refills: 3 | Status: SHIPPED | OUTPATIENT
Start: 2025-03-17

## 2025-04-29 ENCOUNTER — OFFICE VISIT (OUTPATIENT)
Dept: MEDICAL GROUP | Facility: PHYSICIAN GROUP | Age: 70
End: 2025-04-29
Payer: MEDICARE

## 2025-04-29 DIAGNOSIS — E11.9 TYPE 2 DIABETES MELLITUS WITHOUT COMPLICATION, WITH LONG-TERM CURRENT USE OF INSULIN (HCC): ICD-10-CM

## 2025-04-29 DIAGNOSIS — Z79.4 TYPE 2 DIABETES MELLITUS WITHOUT COMPLICATION, WITH LONG-TERM CURRENT USE OF INSULIN (HCC): ICD-10-CM

## 2025-04-29 LAB
HBA1C MFR BLD: 7.1 % (ref ?–5.8)
POCT INT CON NEG: NEGATIVE
POCT INT CON POS: POSITIVE
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
## 2025-04-30 DIAGNOSIS — Z79.4 TYPE 2 DIABETES MELLITUS WITHOUT COMPLICATION, WITH LONG-TERM CURRENT USE OF INSULIN (HCC): ICD-10-CM

## 2025-04-30 DIAGNOSIS — E11.9 TYPE 2 DIABETES MELLITUS WITHOUT COMPLICATION, WITH LONG-TERM CURRENT USE OF INSULIN (HCC): ICD-10-CM

## 2025-04-30 RX ORDER — FLURBIPROFEN SODIUM 0.3 MG/ML
SOLUTION/ DROPS OPHTHALMIC
Qty: 90 EACH | Refills: 3 | Status: SHIPPED | OUTPATIENT
Start: 2025-04-30

## 2025-04-30 NOTE — TELEPHONE ENCOUNTER
Requested Prescriptions     Signed Prescriptions Disp Refills    Insulin Pen Needle (B-D UF III MINI PEN NEEDLES) 31G X 5 MM Misc 90 Each 3     Sig: USE 1 PEN NEEDLE DAILY     Authorizing Provider: JAMES VELEZ A.P.R.N.

## 2025-06-06 SDOH — ECONOMIC STABILITY: INCOME INSECURITY: IN THE LAST 12 MONTHS, WAS THERE A TIME WHEN YOU WERE NOT ABLE TO PAY THE MORTGAGE OR RENT ON TIME?: NO

## 2025-06-06 SDOH — ECONOMIC STABILITY: FOOD INSECURITY: WITHIN THE PAST 12 MONTHS, YOU WORRIED THAT YOUR FOOD WOULD RUN OUT BEFORE YOU GOT MONEY TO BUY MORE.: NEVER TRUE

## 2025-06-06 SDOH — HEALTH STABILITY: PHYSICAL HEALTH: ON AVERAGE, HOW MANY MINUTES DO YOU ENGAGE IN EXERCISE AT THIS LEVEL?: 30 MIN

## 2025-06-06 SDOH — HEALTH STABILITY: PHYSICAL HEALTH: ON AVERAGE, HOW MANY DAYS PER WEEK DO YOU ENGAGE IN MODERATE TO STRENUOUS EXERCISE (LIKE A BRISK WALK)?: 5 DAYS

## 2025-06-06 SDOH — ECONOMIC STABILITY: INCOME INSECURITY: HOW HARD IS IT FOR YOU TO PAY FOR THE VERY BASICS LIKE FOOD, HOUSING, MEDICAL CARE, AND HEATING?: NOT HARD AT ALL

## 2025-06-06 SDOH — ECONOMIC STABILITY: FOOD INSECURITY: WITHIN THE PAST 12 MONTHS, THE FOOD YOU BOUGHT JUST DIDN'T LAST AND YOU DIDN'T HAVE MONEY TO GET MORE.: NEVER TRUE

## 2025-06-06 ASSESSMENT — SOCIAL DETERMINANTS OF HEALTH (SDOH)
HOW OFTEN DO YOU GET TOGETHER WITH FRIENDS OR RELATIVES?: ONCE A WEEK
HOW OFTEN DO YOU HAVE SIX OR MORE DRINKS ON ONE OCCASION: NEVER
IN THE PAST 12 MONTHS, HAS THE ELECTRIC, GAS, OIL, OR WATER COMPANY THREATENED TO SHUT OFF SERVICE IN YOUR HOME?: NO
HOW OFTEN DO YOU HAVE A DRINK CONTAINING ALCOHOL: MONTHLY OR LESS
DO YOU BELONG TO ANY CLUBS OR ORGANIZATIONS SUCH AS CHURCH GROUPS UNIONS, FRATERNAL OR ATHLETIC GROUPS, OR SCHOOL GROUPS?: YES
IN A TYPICAL WEEK, HOW MANY TIMES DO YOU TALK ON THE PHONE WITH FAMILY, FRIENDS, OR NEIGHBORS?: ONCE A WEEK
HOW MANY DRINKS CONTAINING ALCOHOL DO YOU HAVE ON A TYPICAL DAY WHEN YOU ARE DRINKING: 1 OR 2
HOW OFTEN DO YOU ATTEND CHURCH OR RELIGIOUS SERVICES?: NEVER
HOW OFTEN DO YOU ATTENT MEETINGS OF THE CLUB OR ORGANIZATION YOU BELONG TO?: 1 TO 4 TIMES PER YEAR
WITHIN THE PAST 12 MONTHS, YOU WORRIED THAT YOUR FOOD WOULD RUN OUT BEFORE YOU GOT THE MONEY TO BUY MORE: NEVER TRUE
DO YOU BELONG TO ANY CLUBS OR ORGANIZATIONS SUCH AS CHURCH GROUPS UNIONS, FRATERNAL OR ATHLETIC GROUPS, OR SCHOOL GROUPS?: YES
HOW OFTEN DO YOU GET TOGETHER WITH FRIENDS OR RELATIVES?: ONCE A WEEK
HOW HARD IS IT FOR YOU TO PAY FOR THE VERY BASICS LIKE FOOD, HOUSING, MEDICAL CARE, AND HEATING?: NOT HARD AT ALL
HOW OFTEN DO YOU ATTEND CHURCH OR RELIGIOUS SERVICES?: NEVER
IN A TYPICAL WEEK, HOW MANY TIMES DO YOU TALK ON THE PHONE WITH FAMILY, FRIENDS, OR NEIGHBORS?: ONCE A WEEK
HOW OFTEN DO YOU ATTENT MEETINGS OF THE CLUB OR ORGANIZATION YOU BELONG TO?: 1 TO 4 TIMES PER YEAR

## 2025-06-06 ASSESSMENT — LIFESTYLE VARIABLES
HOW OFTEN DO YOU HAVE A DRINK CONTAINING ALCOHOL: MONTHLY OR LESS
HOW MANY STANDARD DRINKS CONTAINING ALCOHOL DO YOU HAVE ON A TYPICAL DAY: 1 OR 2
HOW OFTEN DO YOU HAVE SIX OR MORE DRINKS ON ONE OCCASION: NEVER
SKIP TO QUESTIONS 9-10: 1
AUDIT-C TOTAL SCORE: 1

## 2025-06-09 ENCOUNTER — OFFICE VISIT (OUTPATIENT)
Dept: MEDICAL GROUP | Facility: PHYSICIAN GROUP | Age: 70
End: 2025-06-09
Payer: MEDICARE

## 2025-06-09 VITALS
SYSTOLIC BLOOD PRESSURE: 108 MMHG | DIASTOLIC BLOOD PRESSURE: 64 MMHG | OXYGEN SATURATION: 94 % | TEMPERATURE: 97.9 F | BODY MASS INDEX: 30.31 KG/M2 | WEIGHT: 216.49 LBS | HEART RATE: 66 BPM | HEIGHT: 71 IN

## 2025-06-09 DIAGNOSIS — N40.1 BENIGN PROSTATIC HYPERPLASIA WITH NOCTURIA: ICD-10-CM

## 2025-06-09 DIAGNOSIS — R35.1 BENIGN PROSTATIC HYPERPLASIA WITH NOCTURIA: ICD-10-CM

## 2025-06-09 DIAGNOSIS — R79.89 LOW TESTOSTERONE: ICD-10-CM

## 2025-06-09 DIAGNOSIS — I10 PRIMARY HYPERTENSION: ICD-10-CM

## 2025-06-09 DIAGNOSIS — E66.9 OBESITY (BMI 30-39.9): ICD-10-CM

## 2025-06-09 DIAGNOSIS — L98.9 SKIN LESION OF RIGHT ARM: ICD-10-CM

## 2025-06-09 DIAGNOSIS — Z00.00 MEDICARE ANNUAL WELLNESS VISIT, SUBSEQUENT: Primary | ICD-10-CM

## 2025-06-09 DIAGNOSIS — E11.9 TYPE 2 DIABETES MELLITUS WITHOUT COMPLICATION, WITH LONG-TERM CURRENT USE OF INSULIN (HCC): ICD-10-CM

## 2025-06-09 DIAGNOSIS — Z79.4 TYPE 2 DIABETES MELLITUS WITHOUT COMPLICATION, WITH LONG-TERM CURRENT USE OF INSULIN (HCC): ICD-10-CM

## 2025-06-09 DIAGNOSIS — L81.9 DISCOLORATION OF SKIN OF FACE: ICD-10-CM

## 2025-06-09 DIAGNOSIS — E78.2 MIXED HYPERLIPIDEMIA: ICD-10-CM

## 2025-06-09 DIAGNOSIS — Z12.83 SKIN CANCER SCREENING: ICD-10-CM

## 2025-06-09 PROCEDURE — 3078F DIAST BP <80 MM HG: CPT | Performed by: NURSE PRACTITIONER

## 2025-06-09 PROCEDURE — 3074F SYST BP LT 130 MM HG: CPT | Performed by: NURSE PRACTITIONER

## 2025-06-09 PROCEDURE — G0439 PPPS, SUBSEQ VISIT: HCPCS | Performed by: NURSE PRACTITIONER

## 2025-06-09 ASSESSMENT — PATIENT HEALTH QUESTIONNAIRE - PHQ9: CLINICAL INTERPRETATION OF PHQ2 SCORE: 0

## 2025-06-09 ASSESSMENT — ENCOUNTER SYMPTOMS: GENERAL WELL-BEING: GOOD

## 2025-06-09 ASSESSMENT — ACTIVITIES OF DAILY LIVING (ADL): BATHING_REQUIRES_ASSISTANCE: 0

## 2025-06-09 ASSESSMENT — FIBROSIS 4 INDEX: FIB4 SCORE: 1.47
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
## 2025-06-09 NOTE — ASSESSMENT & PLAN NOTE
BP today 108/64.  Continues lisinopril 10 mg daily. No home BP monitoring.  No chest pain, shortness of breath or dizziness.

## 2025-06-09 NOTE — ASSESSMENT & PLAN NOTE
Follows with pharmacotherapy.  4/2025 A1c 7.1%. Continues Trulicity 4.5 mg every 7 day dose, lantus 26 units every evening, Jardiance 25 mg daily, and metformin 1000 mg twice daily. He has been eating salads every other night. He started walking 5 days a week for 35 minutes for last 6 weeks. With higher Trulicity dose has noticed smaller appetite.  With dose increase of Trulicity blood sugars continue to improve.  Most recently blood sugars 101-131.

## 2025-06-09 NOTE — Clinical Note
REFERRAL APPROVAL NOTICE         Sent on June 9, 2025                   John H Reyes  [address]  [place] [address]                   Dear Mr. Reyes,    After a careful review of the medical information and benefit coverage, [facility] has processed your referral. See below for additional details.    If applicable, you must be actively enrolled with your insurance for coverage of the authorized service. If you have any questions regarding your coverage, please contact your insurance directly.    REFERRAL INFORMATION   Referral #:  [id number]  Referred-To Department    Referred-By Provider:  Dermatology    RUCHI Reid   [facility]      [address]  [place] [address]  [phone] [address] [facility] [facility] [address]  [place] [id number] [phone]    Referral Start Date:  06/09/2025  Referral End Date:   06/09/2026             SCHEDULING  If you do not already have an appointment, please call [phone] to make an appointment.     MORE INFORMATION  If you do not already have a [facility] account, sign up at: [email or web address]  You can access your medical information, make appointments, see lab results, billing information, and more.  If you have questions regarding this referral, please contact  the [facility] Referrals department at:             [phone]. Monday - Friday 8:00AM - 5:00PM.     Sincerely,    [facility]

## 2025-07-01 ENCOUNTER — HOSPITAL ENCOUNTER (OUTPATIENT)
Dept: LAB | Facility: MEDICAL CENTER | Age: 70
End: 2025-07-01
Attending: OPHTHALMOLOGY
Payer: MEDICARE

## 2025-07-01 LAB
T3 SERPL-MCNC: 115 NG/DL (ref 60–181)
T4 FREE SERPL-MCNC: 0.92 NG/DL (ref 0.93–1.7)
TSH SERPL-ACNC: 2.27 UIU/ML (ref 0.38–5.33)

## 2025-07-01 PROCEDURE — 86041 ACETYLCHOLN RCPTR BNDNG ANTB: CPT

## 2025-07-01 PROCEDURE — 36415 COLL VENOUS BLD VENIPUNCTURE: CPT

## 2025-07-01 PROCEDURE — 84439 ASSAY OF FREE THYROXINE: CPT

## 2025-07-01 PROCEDURE — 84443 ASSAY THYROID STIM HORMONE: CPT

## 2025-07-01 PROCEDURE — 83520 IMMUNOASSAY QUANT NOS NONAB: CPT

## 2025-07-01 PROCEDURE — 84480 ASSAY TRIIODOTHYRONINE (T3): CPT

## 2025-07-01 PROCEDURE — 84445 ASSAY OF TSI GLOBULIN: CPT

## 2025-07-01 PROCEDURE — 86366 MUSCLE-SPECIFIC KINASE ANTB: CPT

## 2025-07-01 PROCEDURE — 86042 ACETYLCHOLN RCPTR BLCKG ANTB: CPT

## 2025-07-02 ENCOUNTER — OFFICE VISIT (OUTPATIENT)
Dept: DERMATOLOGY | Facility: IMAGING CENTER | Age: 70
End: 2025-07-02
Payer: MEDICARE

## 2025-07-02 DIAGNOSIS — L81.4 LENTIGINES: Primary | ICD-10-CM

## 2025-07-02 DIAGNOSIS — D18.01 CHERRY ANGIOMA: ICD-10-CM

## 2025-07-02 DIAGNOSIS — L82.1 SK (SEBORRHEIC KERATOSIS): ICD-10-CM

## 2025-07-02 DIAGNOSIS — Z12.83 SKIN CANCER SCREENING: ICD-10-CM

## 2025-07-02 DIAGNOSIS — D22.9 MULTIPLE NEVI: ICD-10-CM

## 2025-07-02 DIAGNOSIS — D23.9 DILATED PORE OF WINER: ICD-10-CM

## 2025-07-02 PROCEDURE — 99212 OFFICE O/P EST SF 10 MIN: CPT | Performed by: NURSE PRACTITIONER

## 2025-07-02 NOTE — PROGRESS NOTES
DERMATOLOGY NOTE  NEW VISIT       Chief complaint: Establish Care (LUKE)     HPI/location: right arm  Time present: many years  Painful lesion: No  Itching lesion: No  Enlarging lesion: Yes    HPI/location: left cheek  Time present: a few years   Painful lesion: No  Itching lesion: No  Enlarging lesion: Yes    HPI/location: right abd   Time present: 1-1.5 years   Painful lesion: No  Itching lesion: No  Enlarging lesion: No  PCP believes spot may be an ingrown hair, patient picks at spot which causes bleeding     History of skin cancer: No  History of precancers/actinic keratoses: No  History of biopsies:No  History of blistering/severe sunburns:Yes, Details: right upper thigh as a child  Family history of skin cancer:No  Family history of atypical moles:No      Allergies[1]     MEDICATIONS:  Medications relevant to specialty reviewed.     REVIEW OF SYSTEMS:   Positive for skin (see HPI)  Negative for fevers and chills       EXAM:  There were no vitals taken for this visit.  Constitutional: Well-developed, well-nourished, and in no distress.     A total body skin exam was performed excluding the genitals per patient preference and including the following areas: head (including face), neck, chest, abdomen, groin/buttocks, back, bilateral upper extremities, and bilateral lower extremities with the following pertinent findings listed below and/or in assessment/plan.     -Sun exposed skin of trunk and b/l upper, lower extremities and face with scattered clinically benign light brown reticulated macules all of which were morphologically similar and none of which were suspicious for skin cancer today on exam    -Several scattered 1-3mm bright red macules and thin papules on the trunk and extremities    -Multiple tan light brown medium brown macules papules scattered over the trunk >> extremities--All with benign-appearing pigment network patterns on dermoscopy    -Few tan stuck-on waxy papules scattered on the trunk and  extremities     -Dilated pore of Tiny R low abdomen/groin      IMPRESSION / PLAN:    1. Lentigines   - Benign-appearing nature of lesions discussed during exam.   - Advised to continue to monitor for any return to clinic for new or concerning changes.      2. Cherry angioma  - Benign-appearing nature of lesions discussed during exam.   - Advised to continue to monitor for any return to clinic for new or concerning changes.      3. Multiple nevi  - Benign-appearing nature of lesions discussed during exam.   - Advised to continue to monitor for any return to clinic for new or concerning changes.  - ABCDE's of melanoma discussed/handout given      4. SK (seborrheic keratosis)  - Benign-appearing nature of lesions discussed during exam.   - Advised to continue to monitor for any return to clinic for new or concerning changes.      5. Dilated pore of Tiny  - Benign-appearing nature of lesions discussed during exam.   - Advised to continue to monitor for any return to clinic for new or concerning changes.      6. Skin cancer screening  Skin cancer education  discussed importance of sun protective clothing, eyewear in addition to the use of broad spectrum sunscreen with SPF 30 or greater, as well as need for reapplication ~every 2 hours when exposed to UVR/handout given  discussed importance following up for any new or changing lesions as noted in handout given, but every 12 months exams in clinic in the setting of dermatologic history  ABCDE's of melanoma discussed/handout given          Please note that this dictation was created using voice recognition software. I have made every reasonable attempt to correct obvious errors, but I expect that there are errors of grammar and possibly content that I did not discover before finalizing the note.      Return to clinic in: Return in about 1 year (around 7/2/2026) for LUKE. and as needed for any new or changing skin lesions.              [1]   Allergies  Allergen Reactions     Sulfa Drugs Rash     Red man syndrome       Septra [Bactrim Ds] Rash     Per pt. Doesn't remember

## 2025-07-03 LAB
TSH RECEP AB SER-ACNC: <1.1 IU/L
TSI SER-ACNC: <0.1 IU/L

## 2025-07-04 LAB
ACHR BIND AB SER-SCNC: 0 NMOL/L (ref 0–0.4)
ACHR BLOCK AB/ACHR TOTAL SFR SER: 0 % (ref 0–26)
MUSK AB SER QL: NORMAL

## 2025-07-29 ENCOUNTER — OFFICE VISIT (OUTPATIENT)
Dept: MEDICAL GROUP | Facility: PHYSICIAN GROUP | Age: 70
End: 2025-07-29
Payer: MEDICARE

## 2025-07-29 DIAGNOSIS — Z79.4 TYPE 2 DIABETES MELLITUS WITHOUT COMPLICATION, WITH LONG-TERM CURRENT USE OF INSULIN (HCC): Primary | ICD-10-CM

## 2025-07-29 DIAGNOSIS — E11.9 TYPE 2 DIABETES MELLITUS WITHOUT COMPLICATION, WITH LONG-TERM CURRENT USE OF INSULIN (HCC): Primary | ICD-10-CM

## 2025-07-29 LAB
HBA1C MFR BLD: 7.1 % (ref ?–5.8)
POCT INT CON NEG: NEGATIVE
POCT INT CON POS: POSITIVE
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
## 2025-08-06 ENCOUNTER — HOSPITAL ENCOUNTER (OUTPATIENT)
Dept: LAB | Facility: MEDICAL CENTER | Age: 70
End: 2025-08-06
Payer: MEDICARE

## 2025-08-06 LAB
BASOPHILS # BLD AUTO: 0.8 % (ref 0–1.8)
BASOPHILS # BLD: 0.07 K/UL (ref 0–0.12)
EOSINOPHIL # BLD AUTO: 0.3 K/UL (ref 0–0.51)
EOSINOPHIL NFR BLD: 3.3 % (ref 0–6.9)
ERYTHROCYTE [DISTWIDTH] IN BLOOD BY AUTOMATED COUNT: 46.9 FL (ref 35.9–50)
HCT VFR BLD AUTO: 52.2 % (ref 42–52)
HGB BLD-MCNC: 17.3 G/DL (ref 14–18)
IMM GRANULOCYTES # BLD AUTO: 0.03 K/UL (ref 0–0.11)
IMM GRANULOCYTES NFR BLD AUTO: 0.3 % (ref 0–0.9)
LYMPHOCYTES # BLD AUTO: 2.21 K/UL (ref 1–4.8)
LYMPHOCYTES NFR BLD: 24.5 % (ref 22–41)
MCH RBC QN AUTO: 30.4 PG (ref 27–33)
MCHC RBC AUTO-ENTMCNC: 33.1 G/DL (ref 32.3–36.5)
MCV RBC AUTO: 91.7 FL (ref 81.4–97.8)
MONOCYTES # BLD AUTO: 0.71 K/UL (ref 0–0.85)
MONOCYTES NFR BLD AUTO: 7.9 % (ref 0–13.4)
NEUTROPHILS # BLD AUTO: 5.71 K/UL (ref 1.82–7.42)
NEUTROPHILS NFR BLD: 63.2 % (ref 44–72)
NRBC # BLD AUTO: 0 K/UL
NRBC BLD-RTO: 0 /100 WBC (ref 0–0.2)
PLATELET # BLD AUTO: 226 K/UL (ref 164–446)
PMV BLD AUTO: 9.9 FL (ref 9–12.9)
RBC # BLD AUTO: 5.69 M/UL (ref 4.7–6.1)
WBC # BLD AUTO: 9 K/UL (ref 4.8–10.8)

## 2025-08-06 PROCEDURE — 36415 COLL VENOUS BLD VENIPUNCTURE: CPT

## 2025-08-06 PROCEDURE — 85025 COMPLETE CBC W/AUTO DIFF WBC: CPT

## 2025-08-25 DIAGNOSIS — Z79.4 TYPE 2 DIABETES MELLITUS WITHOUT COMPLICATION, WITH LONG-TERM CURRENT USE OF INSULIN (HCC): ICD-10-CM

## 2025-08-25 DIAGNOSIS — E11.9 TYPE 2 DIABETES MELLITUS WITHOUT COMPLICATION, WITH LONG-TERM CURRENT USE OF INSULIN (HCC): ICD-10-CM

## 2025-08-25 RX ORDER — TIRZEPATIDE 5 MG/.5ML
1 INJECTION, SOLUTION SUBCUTANEOUS
Qty: 2 ML | Refills: 0 | Status: SHIPPED | OUTPATIENT
Start: 2025-08-25

## 2025-08-25 RX ORDER — INSULIN GLARGINE 100 [IU]/ML
26 INJECTION, SOLUTION SUBCUTANEOUS EVERY EVENING
Qty: 15 ML | Refills: 4 | Status: SHIPPED | OUTPATIENT
Start: 2025-08-25

## (undated) DEVICE — GLOVE SZ 6.5 BIOGEL PI MICRO - PF LF (50PR/BX)

## (undated) DEVICE — SYSTEM CLEARIFY VISUALIZATION (10EA/PK)

## (undated) DEVICE — GLOVE BIOGEL SZ 7.5 SURGICAL PF LTX - (50PR/BX 4BX/CA)

## (undated) DEVICE — OBTURATOR BLADELESS STANDARD 8MM (6EA/BX)

## (undated) DEVICE — SET EXTENSION WITH 2 PORTS (48EA/CA) ***PART #2C8610 IS A SUBSTITUTE*****

## (undated) DEVICE — FORCEPS TENACULUM DA VINCI 10X'S REUSABLE

## (undated) DEVICE — TROCAR 5X100 NON BLADED Z-TH - READ KII (6/BX)

## (undated) DEVICE — CANISTER SUCTION 3000ML MECHANICAL FILTER AUTO SHUTOFF MEDI-VAC NONSTERILE LF DISP  (40EA/CA)

## (undated) DEVICE — ROBOTIC SURGERY SERVICES

## (undated) DEVICE — COVER TIP ENDOWRIST HOT SHEAR - (10EA/BX) DA VINCI

## (undated) DEVICE — PACK DAVINCI PROSTATECTOMY - (1EA/CA)

## (undated) DEVICE — MASK ANESTHESIA ADULT  - (100/CA)

## (undated) DEVICE — TROCAR Z THREAD12MM OPTICAL - NON BLADED (6/BX)

## (undated) DEVICE — SUTURE 4-0 MONOCRYL PLUS PS-1 - 27 INCH (36/BX)

## (undated) DEVICE — DRAPE COLUMN  BOX OF 20

## (undated) DEVICE — SENSOR SPO2 NEO LNCS ADHESIVE (20/BX) SEE USER NOTES

## (undated) DEVICE — TUBE CONNECT SUCTION CLEAR 120 X 1/4" (50EA/CA)"

## (undated) DEVICE — BLADE SURGICAL CLIPPER - (50EA/CA)

## (undated) DEVICE — TUBING CLEARLINK DUO-VENT - C-FLO (48EA/CA)

## (undated) DEVICE — ARMREST CRADLE FOAM - (2PR/PK 12PR/CA)

## (undated) DEVICE — TUBE E-T HI-LO CUFF 7.0MM (10EA/PK)

## (undated) DEVICE — SUCTION INSTRUMENT YANKAUER BULBOUS TIP W/O VENT (50EA/CA)

## (undated) DEVICE — SEAL 5MM-8MM UNIVERSAL  BOX OF 10

## (undated) DEVICE — BAG DRAINAGE ANTIREFLUX TOWER SLIDE TAP 4000 ML (20EA/CA)

## (undated) DEVICE — TOWEL STOP TIMEOUT SAFETY FLAG (40EA/CA)

## (undated) DEVICE — GLOVE BIOGEL PI INDICATOR SZ 6.5 SURGICAL PF LF - (50/BX 4BX/CA)

## (undated) DEVICE — PAD OR TABLE DA VINCI 2IN X 20IN X 72IN - (12EA/CA)

## (undated) DEVICE — GVL 3 STAT DISPOSABLE - (10/BX)

## (undated) DEVICE — ELECTRODE 850 FOAM ADHESIVE - HYDROGEL RADIOTRNSPRNT (50/PK)

## (undated) DEVICE — SUTURE 0 VICRYL PLUS CT-1 - 36 INCH (36/BX)

## (undated) DEVICE — SUTURE 3-0 VICRYL PLUS RB-1 - (36/BX)

## (undated) DEVICE — SCISSORS 5MM CVD (6EA/BX)

## (undated) DEVICE — KIT ANESTHESIA W/CIRCUIT & 3/LT BAG W/FILTER (20EA/CA)

## (undated) DEVICE — NEEDLE DRIVER LARGE DA VINCI 10X'S REUSABLE

## (undated) DEVICE — FORCEPS PROGRASP DA VINCI 10X'S REUSABLE

## (undated) DEVICE — AIRLESS LAP SPRAY TIP VISTASEAL (3EA/BX)

## (undated) DEVICE — PACK TRENGUARD 450 PROCEDURE (12EA/CA)

## (undated) DEVICE — CATHETER FOLEY 16 FR. 30 CC - 2-WAY

## (undated) DEVICE — NEPTUNE 4 PORT MANIFOLD - (20/PK)

## (undated) DEVICE — GLOVE BIOGEL PI ORTHO SZ 6 1/2 SURGICAL PF LF (40PR/BX)

## (undated) DEVICE — SET LEADWIRE 5 LEAD BEDSIDE DISPOSABLE ECG (1SET OF 5/EA)

## (undated) DEVICE — DRAPE LARGE 3 QUARTER - (20/CA)

## (undated) DEVICE — TOWELS CLOTH SURGICAL - (4/PK 20PK/CA)

## (undated) DEVICE — ELECTRODE DUAL RETURN W/ CORD - (50/PK)

## (undated) DEVICE — HEAD HOLDER JUNIOR/ADULT

## (undated) DEVICE — Device

## (undated) DEVICE — GOWN WARMING STANDARD FLEX - (30/CA)

## (undated) DEVICE — DRAPE ARM  BOX OF 20

## (undated) DEVICE — SHEARS MONOPOLAR CURVED  DA VINCI 10X'S REUSABLE

## (undated) DEVICE — SUTURE 2-0 30CM STRATAFIX SPIRAL PDO ***WAS PART #SXPD1B401 *****

## (undated) DEVICE — WATER IRRIGATION STERILE 1000ML (12EA/CA)

## (undated) DEVICE — NEEDLE INSFL 120MM 14GA VRRS - (20/BX)

## (undated) DEVICE — SET IRRIGATION CYSTOSCOPY Y-TYPE L81 IN (20EA/CA)

## (undated) DEVICE — FORCEPS FENESTRATED BIPOLAR DA VINCI 10X'S REUSABLE

## (undated) DEVICE — BAG RETRIEVAL 10ML (10EA/BX)

## (undated) DEVICE — DEVICE CLOSURE KIT VISTASEAL 4ML (1EA/BX)

## (undated) DEVICE — SET TUBING PNEUMOCLEAR HIGH FLOW SMOKE EVACUATION (10EA/BX)

## (undated) DEVICE — CATHETER URETHRAL FOLEY SILICONE 22 FR 30 ML 3-WAY

## (undated) DEVICE — SYRINGE 30 ML LL (56/BX)

## (undated) DEVICE — GLOVE BIOGEL INDICATOR SZ 7SURGICAL PF LTX - (50/BX 4BX/CA)

## (undated) DEVICE — LACTATED RINGERS INJ 1000 ML - (14EA/CA 60CA/PF)

## (undated) DEVICE — SUTURE 2-0 SILK SH (36PK/BX)

## (undated) DEVICE — SUTURE GENERAL

## (undated) DEVICE — GLOVE BIOGEL PI INDICATOR SZ 7.5 SURGICAL PF LF -(50/BX 4BX/CA)

## (undated) DEVICE — GOWN SURGEONS LARGE - (32/CA)

## (undated) DEVICE — PROTECTOR ULNA NERVE - (36PR/CA)